# Patient Record
Sex: FEMALE | Race: BLACK OR AFRICAN AMERICAN | NOT HISPANIC OR LATINO | Employment: FULL TIME | URBAN - METROPOLITAN AREA
[De-identification: names, ages, dates, MRNs, and addresses within clinical notes are randomized per-mention and may not be internally consistent; named-entity substitution may affect disease eponyms.]

---

## 2018-04-30 LAB
EXTERNAL ABO GROUPING: NORMAL
EXTERNAL GROUP B STREP ANTIGEN: POSITIVE
EXTERNAL HEPATITIS B SURFACE ANTIGEN: NEGATIVE
EXTERNAL HEPATITIS C AB: NORMAL
EXTERNAL RH FACTOR: NEGATIVE
EXTERNAL RUBELLA QUALITATIVE: NORMAL
EXTERNAL SYPHILIS RPR SCREEN: NORMAL
HIV1 P24 AG SERPL QL IA: NORMAL

## 2018-11-01 ENCOUNTER — HOSPITAL ENCOUNTER (OUTPATIENT)
Facility: HOSPITAL | Age: 23
Setting detail: OBSERVATION
Discharge: HOME | End: 2018-11-01
Attending: OBSTETRICS & GYNECOLOGY | Admitting: OBSTETRICS & GYNECOLOGY

## 2018-11-01 VITALS
HEIGHT: 67 IN | DIASTOLIC BLOOD PRESSURE: 71 MMHG | SYSTOLIC BLOOD PRESSURE: 120 MMHG | HEART RATE: 61 BPM | BODY MASS INDEX: 35.63 KG/M2 | TEMPERATURE: 99 F | WEIGHT: 227 LBS | RESPIRATION RATE: 16 BRPM

## 2018-11-01 PROBLEM — Z34.90 PREGNANCY: Status: ACTIVE | Noted: 2018-11-01

## 2018-11-01 PROCEDURE — G0378 HOSPITAL OBSERVATION PER HR: HCPCS

## 2018-11-01 PROCEDURE — 59025 FETAL NON-STRESS TEST: CPT

## 2018-11-01 RX ORDER — PRENATAL VIT NO.126/IRON/FOLIC 28MG-0.8MG
1 TABLET ORAL DAILY
COMMUNITY
End: 2022-12-26

## 2018-11-01 RX ORDER — FERROUS SULFATE 325(65) MG
325 TABLET ORAL
COMMUNITY
End: 2022-01-20

## 2018-12-01 ENCOUNTER — ANESTHESIA EVENT (OUTPATIENT)
Dept: LABOR AND DELIVERY | Facility: HOSPITAL | Age: 23
End: 2018-12-01

## 2018-12-01 ENCOUNTER — ANESTHESIA (OUTPATIENT)
Dept: LABOR AND DELIVERY | Facility: HOSPITAL | Age: 23
End: 2018-12-01

## 2018-12-01 ENCOUNTER — HOSPITAL ENCOUNTER (INPATIENT)
Facility: HOSPITAL | Age: 23
LOS: 3 days | Discharge: HOME | End: 2018-12-04
Attending: OBSTETRICS & GYNECOLOGY | Admitting: OBSTETRICS & GYNECOLOGY

## 2018-12-01 LAB
ABO GROUP BLD: NORMAL
BASOPHILS # BLD AUTO: 0.01 10*3/MM3 (ref 0–0.2)
BASOPHILS NFR BLD AUTO: 0.1 % (ref 0–1.5)
BLD GP AB SCN SERPL QL: NEGATIVE
DEPRECATED RDW RBC AUTO: 52.1 FL (ref 37–54)
EOSINOPHIL # BLD AUTO: 0.09 10*3/MM3 (ref 0–0.7)
EOSINOPHIL NFR BLD AUTO: 1.1 % (ref 0.3–6.2)
ERYTHROCYTE [DISTWIDTH] IN BLOOD BY AUTOMATED COUNT: 16.3 % (ref 11.7–13)
HCT VFR BLD AUTO: 34.1 % (ref 35.6–45.5)
HGB BLD-MCNC: 10.1 G/DL (ref 11.9–15.5)
IMM GRANULOCYTES # BLD: 0.03 10*3/MM3 (ref 0–0.03)
IMM GRANULOCYTES NFR BLD: 0.4 % (ref 0–0.5)
LYMPHOCYTES # BLD AUTO: 2.34 10*3/MM3 (ref 0.9–4.8)
LYMPHOCYTES NFR BLD AUTO: 27.8 % (ref 19.6–45.3)
MCH RBC QN AUTO: 25.9 PG (ref 26.9–32)
MCHC RBC AUTO-ENTMCNC: 29.6 G/DL (ref 32.4–36.3)
MCV RBC AUTO: 87.4 FL (ref 80.5–98.2)
MONOCYTES # BLD AUTO: 0.86 10*3/MM3 (ref 0.2–1.2)
MONOCYTES NFR BLD AUTO: 10.2 % (ref 5–12)
NEUTROPHILS # BLD AUTO: 5.08 10*3/MM3 (ref 1.9–8.1)
NEUTROPHILS NFR BLD AUTO: 60.4 % (ref 42.7–76)
PLATELET # BLD AUTO: 250 10*3/MM3 (ref 140–500)
PMV BLD AUTO: 11.1 FL (ref 6–12)
RBC # BLD AUTO: 3.9 10*6/MM3 (ref 3.9–5.2)
RH BLD: NEGATIVE
T&S EXPIRATION DATE: NORMAL
WBC NRBC COR # BLD: 8.41 10*3/MM3 (ref 4.5–10.7)

## 2018-12-01 PROCEDURE — 25010000002 BUTORPHANOL PER 1 MG: Performed by: OBSTETRICS & GYNECOLOGY

## 2018-12-01 PROCEDURE — 86900 BLOOD TYPING SEROLOGIC ABO: CPT | Performed by: OBSTETRICS & GYNECOLOGY

## 2018-12-01 PROCEDURE — 25010000002 PENICILLIN G POTASSIUM PER 600000 UNITS: Performed by: OBSTETRICS & GYNECOLOGY

## 2018-12-01 PROCEDURE — 86901 BLOOD TYPING SEROLOGIC RH(D): CPT | Performed by: OBSTETRICS & GYNECOLOGY

## 2018-12-01 PROCEDURE — 86850 RBC ANTIBODY SCREEN: CPT | Performed by: OBSTETRICS & GYNECOLOGY

## 2018-12-01 PROCEDURE — 85025 COMPLETE CBC W/AUTO DIFF WBC: CPT | Performed by: OBSTETRICS & GYNECOLOGY

## 2018-12-01 RX ORDER — OXYTOCIN-SODIUM CHLORIDE 0.9% IV SOLN 30 UNIT/500ML 30-0.9/5 UT/ML-%
2-20 SOLUTION INTRAVENOUS
Status: DISCONTINUED | OUTPATIENT
Start: 2018-12-01 | End: 2018-12-02 | Stop reason: HOSPADM

## 2018-12-01 RX ORDER — ZOLPIDEM TARTRATE 5 MG/1
5 TABLET ORAL NIGHTLY PRN
Status: DISCONTINUED | OUTPATIENT
Start: 2018-12-01 | End: 2018-12-02 | Stop reason: HOSPADM

## 2018-12-01 RX ORDER — DIPHENHYDRAMINE HYDROCHLORIDE 50 MG/ML
25 INJECTION INTRAMUSCULAR; INTRAVENOUS NIGHTLY PRN
Status: DISCONTINUED | OUTPATIENT
Start: 2018-12-01 | End: 2018-12-02 | Stop reason: HOSPADM

## 2018-12-01 RX ORDER — ONDANSETRON 2 MG/ML
4 INJECTION INTRAMUSCULAR; INTRAVENOUS EVERY 6 HOURS PRN
Status: DISCONTINUED | OUTPATIENT
Start: 2018-12-01 | End: 2018-12-02 | Stop reason: HOSPADM

## 2018-12-01 RX ORDER — FAMOTIDINE 10 MG/ML
20 INJECTION, SOLUTION INTRAVENOUS EVERY 12 HOURS SCHEDULED
Status: DISCONTINUED | OUTPATIENT
Start: 2018-12-01 | End: 2018-12-02 | Stop reason: HOSPADM

## 2018-12-01 RX ORDER — TERBUTALINE SULFATE 1 MG/ML
0.25 INJECTION, SOLUTION SUBCUTANEOUS AS NEEDED
Status: DISCONTINUED | OUTPATIENT
Start: 2018-12-01 | End: 2018-12-02 | Stop reason: HOSPADM

## 2018-12-01 RX ORDER — OXYCODONE HYDROCHLORIDE AND ACETAMINOPHEN 5; 325 MG/1; MG/1
2 TABLET ORAL EVERY 4 HOURS PRN
Status: DISCONTINUED | OUTPATIENT
Start: 2018-12-01 | End: 2018-12-02 | Stop reason: HOSPADM

## 2018-12-01 RX ORDER — FAMOTIDINE 20 MG/1
20 TABLET, FILM COATED ORAL EVERY 12 HOURS SCHEDULED
Status: DISCONTINUED | OUTPATIENT
Start: 2018-12-01 | End: 2018-12-02 | Stop reason: HOSPADM

## 2018-12-01 RX ORDER — BUTORPHANOL TARTRATE 1 MG/ML
1 INJECTION, SOLUTION INTRAMUSCULAR; INTRAVENOUS
Status: DISCONTINUED | OUTPATIENT
Start: 2018-12-01 | End: 2018-12-01

## 2018-12-01 RX ORDER — DEXTROSE, SODIUM CHLORIDE, SODIUM LACTATE, POTASSIUM CHLORIDE, AND CALCIUM CHLORIDE 5; .6; .31; .03; .02 G/100ML; G/100ML; G/100ML; G/100ML; G/100ML
125 INJECTION, SOLUTION INTRAVENOUS CONTINUOUS
Status: DISCONTINUED | OUTPATIENT
Start: 2018-12-01 | End: 2018-12-02

## 2018-12-01 RX ORDER — MINERAL OIL
1 OIL (ML) MISCELLANEOUS ONCE
Status: DISCONTINUED | OUTPATIENT
Start: 2018-12-01 | End: 2018-12-02 | Stop reason: HOSPADM

## 2018-12-01 RX ORDER — PENICILLIN G 3000000 [IU]/50ML
3 INJECTION, SOLUTION INTRAVENOUS EVERY 4 HOURS
Status: DISCONTINUED | OUTPATIENT
Start: 2018-12-02 | End: 2018-12-02 | Stop reason: HOSPADM

## 2018-12-01 RX ORDER — SODIUM CHLORIDE, SODIUM LACTATE, POTASSIUM CHLORIDE, CALCIUM CHLORIDE 600; 310; 30; 20 MG/100ML; MG/100ML; MG/100ML; MG/100ML
125 INJECTION, SOLUTION INTRAVENOUS CONTINUOUS
Status: DISCONTINUED | OUTPATIENT
Start: 2018-12-01 | End: 2018-12-02

## 2018-12-01 RX ORDER — ACETAMINOPHEN 500 MG
1000 TABLET ORAL EVERY 4 HOURS PRN
Status: DISCONTINUED | OUTPATIENT
Start: 2018-12-01 | End: 2018-12-02 | Stop reason: HOSPADM

## 2018-12-01 RX ORDER — ONDANSETRON 4 MG/1
4 TABLET, ORALLY DISINTEGRATING ORAL EVERY 6 HOURS PRN
Status: DISCONTINUED | OUTPATIENT
Start: 2018-12-01 | End: 2018-12-02 | Stop reason: HOSPADM

## 2018-12-01 RX ORDER — OXYCODONE HYDROCHLORIDE AND ACETAMINOPHEN 5; 325 MG/1; MG/1
1 TABLET ORAL EVERY 4 HOURS PRN
Status: DISCONTINUED | OUTPATIENT
Start: 2018-12-01 | End: 2018-12-02 | Stop reason: HOSPADM

## 2018-12-01 RX ORDER — ONDANSETRON 4 MG/1
4 TABLET, FILM COATED ORAL EVERY 6 HOURS PRN
Status: DISCONTINUED | OUTPATIENT
Start: 2018-12-01 | End: 2018-12-02 | Stop reason: HOSPADM

## 2018-12-01 RX ORDER — DIPHENHYDRAMINE HCL 25 MG
25 CAPSULE ORAL NIGHTLY PRN
Status: DISCONTINUED | OUTPATIENT
Start: 2018-12-01 | End: 2018-12-02 | Stop reason: HOSPADM

## 2018-12-01 RX ADMIN — BUTORPHANOL TARTRATE 2 MG: 2 INJECTION, SOLUTION INTRAMUSCULAR; INTRAVENOUS at 23:58

## 2018-12-01 RX ADMIN — SODIUM CHLORIDE, POTASSIUM CHLORIDE, SODIUM LACTATE AND CALCIUM CHLORIDE 125 ML/HR: 600; 310; 30; 20 INJECTION, SOLUTION INTRAVENOUS at 23:06

## 2018-12-01 RX ADMIN — SODIUM CHLORIDE 5 MILLION UNITS: 900 INJECTION INTRAVENOUS at 23:03

## 2018-12-02 LAB
ABO GROUP BLD: NORMAL
ATMOSPHERIC PRESS: 741 MMHG
BASE EXCESS BLDCOV CALC-SCNC: -2 MMOL/L (ref -30–30)
BDY SITE: ABNORMAL
FETAL BLEED: NEGATIVE
HCO3 BLDCOV-SCNC: 22.9 MMOL/L
MODALITY: ABNORMAL
NOTE: ABNORMAL
NUMBER OF DOSES: NORMAL
PCO2 BLDCOV: 38.7 MM HG (ref 35–51.3)
PH BLDCOV: 7.38 PH UNITS (ref 7.26–7.4)
PO2 BLDCOV: 24.7 MM HG (ref 19–39)
RH BLD: NEGATIVE
SAO2 % BLDCOA: 43.5 % (ref 92–99)
SAO2 % BLDCOV: ABNORMAL %

## 2018-12-02 PROCEDURE — 82803 BLOOD GASES ANY COMBINATION: CPT

## 2018-12-02 PROCEDURE — 86901 BLOOD TYPING SEROLOGIC RH(D): CPT | Performed by: OBSTETRICS & GYNECOLOGY

## 2018-12-02 PROCEDURE — 86900 BLOOD TYPING SEROLOGIC ABO: CPT | Performed by: OBSTETRICS & GYNECOLOGY

## 2018-12-02 PROCEDURE — 25010000002 PENICILLIN G POTASSIUM PER 600000 UNITS: Performed by: OBSTETRICS & GYNECOLOGY

## 2018-12-02 PROCEDURE — 0KQM0ZZ REPAIR PERINEUM MUSCLE, OPEN APPROACH: ICD-10-PCS | Performed by: OBSTETRICS & GYNECOLOGY

## 2018-12-02 PROCEDURE — 85461 HEMOGLOBIN FETAL: CPT | Performed by: OBSTETRICS & GYNECOLOGY

## 2018-12-02 PROCEDURE — C1755 CATHETER, INTRASPINAL: HCPCS | Performed by: ANESTHESIOLOGY

## 2018-12-02 RX ORDER — ONDANSETRON 2 MG/ML
4 INJECTION INTRAMUSCULAR; INTRAVENOUS EVERY 6 HOURS PRN
Status: DISCONTINUED | OUTPATIENT
Start: 2018-12-02 | End: 2018-12-02 | Stop reason: HOSPADM

## 2018-12-02 RX ORDER — EPHEDRINE SULFATE 50 MG/ML
5 INJECTION, SOLUTION INTRAVENOUS AS NEEDED
Status: DISCONTINUED | OUTPATIENT
Start: 2018-12-02 | End: 2018-12-02 | Stop reason: HOSPADM

## 2018-12-02 RX ORDER — ACETAMINOPHEN 325 MG/1
650 TABLET ORAL EVERY 4 HOURS PRN
Status: DISCONTINUED | OUTPATIENT
Start: 2018-12-02 | End: 2018-12-04 | Stop reason: HOSPADM

## 2018-12-02 RX ORDER — FAMOTIDINE 10 MG/ML
20 INJECTION, SOLUTION INTRAVENOUS ONCE AS NEEDED
Status: DISCONTINUED | OUTPATIENT
Start: 2018-12-02 | End: 2018-12-02 | Stop reason: HOSPADM

## 2018-12-02 RX ORDER — SUFENTANIL CITRATE 50 UG/ML
INJECTION EPIDURAL; INTRAVENOUS
Status: COMPLETED
Start: 2018-12-02 | End: 2018-12-02

## 2018-12-02 RX ORDER — BUPIVACAINE HYDROCHLORIDE AND EPINEPHRINE 5; 5 MG/ML; UG/ML
INJECTION, SOLUTION EPIDURAL; INTRACAUDAL; PERINEURAL
Status: DISPENSED
Start: 2018-12-02 | End: 2018-12-03

## 2018-12-02 RX ORDER — ONDANSETRON 4 MG/1
4 TABLET, FILM COATED ORAL EVERY 6 HOURS PRN
Status: DISCONTINUED | OUTPATIENT
Start: 2018-12-02 | End: 2018-12-02 | Stop reason: HOSPADM

## 2018-12-02 RX ORDER — ZOLPIDEM TARTRATE 5 MG/1
5 TABLET ORAL NIGHTLY PRN
Status: DISCONTINUED | OUTPATIENT
Start: 2018-12-02 | End: 2018-12-02 | Stop reason: HOSPADM

## 2018-12-02 RX ORDER — MINERAL OIL
30 OIL (ML) MISCELLANEOUS AS NEEDED
Status: DISCONTINUED | OUTPATIENT
Start: 2018-12-02 | End: 2018-12-02 | Stop reason: HOSPADM

## 2018-12-02 RX ORDER — IBUPROFEN 600 MG/1
600 TABLET ORAL EVERY 8 HOURS PRN
Status: DISCONTINUED | OUTPATIENT
Start: 2018-12-02 | End: 2018-12-04 | Stop reason: HOSPADM

## 2018-12-02 RX ORDER — HYDROMORPHONE HYDROCHLORIDE 1 MG/ML
0.5 INJECTION, SOLUTION INTRAMUSCULAR; INTRAVENOUS; SUBCUTANEOUS
Status: DISCONTINUED | OUTPATIENT
Start: 2018-12-02 | End: 2018-12-02 | Stop reason: HOSPADM

## 2018-12-02 RX ORDER — PROMETHAZINE HYDROCHLORIDE 25 MG/1
12.5 TABLET ORAL EVERY 6 HOURS PRN
Status: DISCONTINUED | OUTPATIENT
Start: 2018-12-02 | End: 2018-12-02 | Stop reason: HOSPADM

## 2018-12-02 RX ORDER — SUFENTANIL CITRATE 50 UG/ML
INJECTION EPIDURAL; INTRAVENOUS AS NEEDED
Status: DISCONTINUED | OUTPATIENT
Start: 2018-12-02 | End: 2018-12-02 | Stop reason: SURG

## 2018-12-02 RX ORDER — BUPIVACAINE HYDROCHLORIDE 2.5 MG/ML
INJECTION, SOLUTION EPIDURAL; INFILTRATION; INTRACAUDAL AS NEEDED
Status: DISCONTINUED | OUTPATIENT
Start: 2018-12-02 | End: 2018-12-02 | Stop reason: SURG

## 2018-12-02 RX ORDER — OXYCODONE HYDROCHLORIDE AND ACETAMINOPHEN 5; 325 MG/1; MG/1
1 TABLET ORAL EVERY 4 HOURS PRN
Status: DISCONTINUED | OUTPATIENT
Start: 2018-12-02 | End: 2018-12-02 | Stop reason: HOSPADM

## 2018-12-02 RX ORDER — PROMETHAZINE HYDROCHLORIDE 12.5 MG/1
12.5 SUPPOSITORY RECTAL EVERY 6 HOURS PRN
Status: DISCONTINUED | OUTPATIENT
Start: 2018-12-02 | End: 2018-12-02 | Stop reason: HOSPADM

## 2018-12-02 RX ORDER — DIPHENHYDRAMINE HYDROCHLORIDE 50 MG/ML
12.5 INJECTION INTRAMUSCULAR; INTRAVENOUS EVERY 8 HOURS PRN
Status: DISCONTINUED | OUTPATIENT
Start: 2018-12-02 | End: 2018-12-02 | Stop reason: HOSPADM

## 2018-12-02 RX ORDER — METHYLERGONOVINE MALEATE 0.2 MG/ML
200 INJECTION INTRAVENOUS ONCE AS NEEDED
Status: DISCONTINUED | OUTPATIENT
Start: 2018-12-02 | End: 2018-12-02 | Stop reason: HOSPADM

## 2018-12-02 RX ORDER — DIPHENHYDRAMINE HYDROCHLORIDE 50 MG/ML
25 INJECTION INTRAMUSCULAR; INTRAVENOUS NIGHTLY PRN
Status: DISCONTINUED | OUTPATIENT
Start: 2018-12-02 | End: 2018-12-02 | Stop reason: HOSPADM

## 2018-12-02 RX ORDER — ONDANSETRON 4 MG/1
4 TABLET, ORALLY DISINTEGRATING ORAL EVERY 6 HOURS PRN
Status: DISCONTINUED | OUTPATIENT
Start: 2018-12-02 | End: 2018-12-02 | Stop reason: HOSPADM

## 2018-12-02 RX ORDER — OXYCODONE AND ACETAMINOPHEN 7.5; 325 MG/1; MG/1
1 TABLET ORAL EVERY 4 HOURS PRN
Status: DISCONTINUED | OUTPATIENT
Start: 2018-12-02 | End: 2018-12-04 | Stop reason: HOSPADM

## 2018-12-02 RX ORDER — PROMETHAZINE HYDROCHLORIDE 25 MG/ML
12.5 INJECTION, SOLUTION INTRAMUSCULAR; INTRAVENOUS EVERY 6 HOURS PRN
Status: DISCONTINUED | OUTPATIENT
Start: 2018-12-02 | End: 2018-12-02 | Stop reason: HOSPADM

## 2018-12-02 RX ORDER — ACETAMINOPHEN 650 MG/1
650 SUPPOSITORY RECTAL EVERY 4 HOURS PRN
Status: DISCONTINUED | OUTPATIENT
Start: 2018-12-02 | End: 2018-12-02 | Stop reason: HOSPADM

## 2018-12-02 RX ORDER — ERYTHROMYCIN 5 MG/G
OINTMENT OPHTHALMIC
Status: DISPENSED
Start: 2018-12-02 | End: 2018-12-03

## 2018-12-02 RX ORDER — OXYTOCIN-SODIUM CHLORIDE 0.9% IV SOLN 30 UNIT/500ML 30-0.9/5 UT/ML-%
250 SOLUTION INTRAVENOUS CONTINUOUS
Status: ACTIVE | OUTPATIENT
Start: 2018-12-02 | End: 2018-12-02

## 2018-12-02 RX ORDER — OXYTOCIN-SODIUM CHLORIDE 0.9% IV SOLN 30 UNIT/500ML 30-0.9/5 UT/ML-%
999 SOLUTION INTRAVENOUS ONCE
Status: COMPLETED | OUTPATIENT
Start: 2018-12-02 | End: 2018-12-02

## 2018-12-02 RX ORDER — LIDOCAINE HYDROCHLORIDE AND EPINEPHRINE 15; 5 MG/ML; UG/ML
INJECTION, SOLUTION EPIDURAL AS NEEDED
Status: DISCONTINUED | OUTPATIENT
Start: 2018-12-02 | End: 2018-12-02 | Stop reason: SURG

## 2018-12-02 RX ORDER — NALBUPHINE HCL 10 MG/ML
5 AMPUL (ML) INJECTION
Status: DISCONTINUED | OUTPATIENT
Start: 2018-12-02 | End: 2018-12-02 | Stop reason: HOSPADM

## 2018-12-02 RX ORDER — PHYTONADIONE 1 MG/.5ML
INJECTION, EMULSION INTRAMUSCULAR; INTRAVENOUS; SUBCUTANEOUS
Status: DISPENSED
Start: 2018-12-02 | End: 2018-12-03

## 2018-12-02 RX ORDER — SODIUM CHLORIDE 0.9 % (FLUSH) 0.9 %
1-10 SYRINGE (ML) INJECTION AS NEEDED
Status: DISCONTINUED | OUTPATIENT
Start: 2018-12-02 | End: 2018-12-04 | Stop reason: HOSPADM

## 2018-12-02 RX ORDER — OXYTOCIN-SODIUM CHLORIDE 0.9% IV SOLN 30 UNIT/500ML 30-0.9/5 UT/ML-%
125 SOLUTION INTRAVENOUS CONTINUOUS PRN
Status: COMPLETED | OUTPATIENT
Start: 2018-12-02 | End: 2018-12-02

## 2018-12-02 RX ORDER — BISACODYL 10 MG
10 SUPPOSITORY, RECTAL RECTAL DAILY PRN
Status: DISCONTINUED | OUTPATIENT
Start: 2018-12-03 | End: 2018-12-04 | Stop reason: HOSPADM

## 2018-12-02 RX ORDER — ONDANSETRON 2 MG/ML
4 INJECTION INTRAMUSCULAR; INTRAVENOUS ONCE AS NEEDED
Status: DISCONTINUED | OUTPATIENT
Start: 2018-12-02 | End: 2018-12-02 | Stop reason: HOSPADM

## 2018-12-02 RX ORDER — MISOPROSTOL 200 UG/1
800 TABLET ORAL AS NEEDED
Status: DISCONTINUED | OUTPATIENT
Start: 2018-12-02 | End: 2018-12-02 | Stop reason: HOSPADM

## 2018-12-02 RX ORDER — IBUPROFEN 600 MG/1
600 TABLET ORAL EVERY 6 HOURS PRN
Status: DISCONTINUED | OUTPATIENT
Start: 2018-12-02 | End: 2018-12-02 | Stop reason: HOSPADM

## 2018-12-02 RX ORDER — DIPHENHYDRAMINE HCL 25 MG
25 CAPSULE ORAL NIGHTLY PRN
Status: DISCONTINUED | OUTPATIENT
Start: 2018-12-02 | End: 2018-12-02 | Stop reason: HOSPADM

## 2018-12-02 RX ORDER — ONDANSETRON 4 MG/1
4 TABLET, FILM COATED ORAL EVERY 8 HOURS PRN
Status: DISCONTINUED | OUTPATIENT
Start: 2018-12-02 | End: 2018-12-04 | Stop reason: HOSPADM

## 2018-12-02 RX ORDER — ACETAMINOPHEN 500 MG
1000 TABLET ORAL EVERY 4 HOURS PRN
Status: DISCONTINUED | OUTPATIENT
Start: 2018-12-02 | End: 2018-12-02 | Stop reason: HOSPADM

## 2018-12-02 RX ORDER — OXYCODONE HYDROCHLORIDE AND ACETAMINOPHEN 5; 325 MG/1; MG/1
2 TABLET ORAL EVERY 4 HOURS PRN
Status: DISCONTINUED | OUTPATIENT
Start: 2018-12-02 | End: 2018-12-02 | Stop reason: HOSPADM

## 2018-12-02 RX ORDER — OXYCODONE HYDROCHLORIDE AND ACETAMINOPHEN 5; 325 MG/1; MG/1
1 TABLET ORAL EVERY 4 HOURS PRN
Status: DISCONTINUED | OUTPATIENT
Start: 2018-12-02 | End: 2018-12-04 | Stop reason: HOSPADM

## 2018-12-02 RX ORDER — CARBOPROST TROMETHAMINE 250 UG/ML
250 INJECTION, SOLUTION INTRAMUSCULAR AS NEEDED
Status: DISCONTINUED | OUTPATIENT
Start: 2018-12-02 | End: 2018-12-02 | Stop reason: HOSPADM

## 2018-12-02 RX ADMIN — SUFENTANIL CITRATE 7 MCG: 50 INJECTION EPIDURAL; INTRAVENOUS at 13:21

## 2018-12-02 RX ADMIN — OXYTOCIN 2 MILLI-UNITS/MIN: 10 INJECTION, SOLUTION INTRAMUSCULAR; INTRAVENOUS at 08:06

## 2018-12-02 RX ADMIN — OXYTOCIN 999 ML/HR: 10 INJECTION, SOLUTION INTRAMUSCULAR; INTRAVENOUS at 15:21

## 2018-12-02 RX ADMIN — SODIUM CHLORIDE, POTASSIUM CHLORIDE, SODIUM LACTATE AND CALCIUM CHLORIDE 125 ML/HR: 600; 310; 30; 20 INJECTION, SOLUTION INTRAVENOUS at 10:58

## 2018-12-02 RX ADMIN — Medication 10 ML/HR: at 03:46

## 2018-12-02 RX ADMIN — OXYTOCIN 125 ML/HR: 10 INJECTION, SOLUTION INTRAMUSCULAR; INTRAVENOUS at 16:38

## 2018-12-02 RX ADMIN — PENICILLIN G 3 MILLION UNITS: 3000000 INJECTION, SOLUTION INTRAVENOUS at 11:34

## 2018-12-02 RX ADMIN — PENICILLIN G 3 MILLION UNITS: 3000000 INJECTION, SOLUTION INTRAVENOUS at 03:56

## 2018-12-02 RX ADMIN — SODIUM CHLORIDE, SODIUM LACTATE, POTASSIUM CHLORIDE, CALCIUM CHLORIDE AND DEXTROSE MONOHYDRATE 125 ML/HR: 5; 600; 310; 30; 20 INJECTION, SOLUTION INTRAVENOUS at 03:56

## 2018-12-02 RX ADMIN — IBUPROFEN 600 MG: 600 TABLET ORAL at 20:16

## 2018-12-02 RX ADMIN — LIDOCAINE HYDROCHLORIDE AND EPINEPHRINE 4 ML: 15; 5 INJECTION, SOLUTION EPIDURAL at 03:39

## 2018-12-02 RX ADMIN — BUPIVACAINE HYDROCHLORIDE 7 ML: 2.5 INJECTION, SOLUTION EPIDURAL; INFILTRATION; INTRACAUDAL; PERINEURAL at 13:21

## 2018-12-02 RX ADMIN — PENICILLIN G 3 MILLION UNITS: 3000000 INJECTION, SOLUTION INTRAVENOUS at 07:34

## 2018-12-02 NOTE — H&P
.[facility]  Obstetric History and Physical    Chief Complaint   Patient presents with   • Contractions       Subjective     Patient is a 22 y.o. female  currently at 40w0d, who presents in early labor. cvx in office 1cm and 3cm on arrival here.    Comfortable now with epidural       Pos gbs      Past OB History:       Obstetric History       T0      L0     SAB0   TAB0   Ectopic0   Molar0   Multiple0   Live Births0       # Outcome Date GA Lbr Issac/2nd Weight Sex Delivery Anes PTL Lv   1 Current                   Past Medical History: Past Medical History:   Diagnosis Date   • Abnormal Pap smear of cervix    • Anemia    • Migraine       Past Surgical History History reviewed. No pertinent surgical history.   Family History: History reviewed. No pertinent family history.   Social History:  reports that  has never smoked. She does not have any smokeless tobacco history on file.   reports that she does not drink alcohol.   reports that she does not use drugs.    Allergies:     Patient has no known allergies.       Objective       Vital Signs Range for the last 24 hours  Temperature: Temp:  [98.4 °F (36.9 °C)-98.6 °F (37 °C)] 98.6 °F (37 °C)   Temp Source: Temp src: Oral   BP: BP: (107-147)/(54-86) 107/54   Pulse: Heart Rate:  [] 65   Respirations: Resp:  [18] 18                   Physical Examination:     General :  Alert in NAD  Abdomen: Gravid, nontender        Cervix: Exam by: Method: sterile exam per physician   Dilation: Cervical Dilation (cm): 5-6   Effacement: Cervical Effacement: 80-90%   Station: Fetal Station: -2       Fetal Heart Rate Assessment   Method: Fetal HR Assessment Method: external   Beats/min: Fetal HR (beats/min): 130   Baseline: Fetal Heart Baseline Rate: normal range   Varibility: Fetal HR Variability: moderate (amplitude range 6 to 25 bpm)   Accels: Fetal HR Accelerations: absent   Decels: Fetal HR Decelerations: prolonged   Tracing Category:       Uterine Assessment    Method: Method: external tocotransducer   Frequency (min): Contraction Frequency (Minutes): occasional   Ctx Count in 10 min:     Duration:     Intensity: Contraction Intensity: mild by palpation   Intensity by IUPC:     Resting Tone: Uterine Resting Tone: soft by palpation   Resting Tone by IUPC:     Jenn Units:         cvx- AROM-clear. 5/90/-2 after arom      Assessment/Plan       Assessment:  1.  Intrauterine pregnancy at 40w0d weeks gestation with reassuring fetal status.    2.  Spontaneous labor-     Plan:   Plan of care has been reviewed with patient and family,.   All questions answered.      pit if needed    Office prenatal reviewed        Macarena Keene MD  12/2/2018  6:36 AM

## 2018-12-02 NOTE — L&D DELIVERY NOTE
[facility]  Vaginal Delivery Note    Regine Martins22 y.o.  at 40w0d            Augmentation: AROM;Oxytocin   Labor onset:2018  9:00 PM   Dilation complete: 2018  1:35 PM   Beginning of second stage: 2018  3:18 PM     Antibiotics received during labor: Yes     Delivery     Delivery: Vaginal, Spontaneous     YOB: 2019    Time of Birth:  Labor complications:  3:19 PM   None .   Anesthesia: Epidural     Delivering clinician: Dai Barber MD   Additional complications:       Infant    Findings: Viable female  infant    Infant observations: Weight: 3260 g (7 lb 3 oz)    Observations/Comments:  Scale 5      Apgars: 8   @ 1 minute /    9   @ 5 minutes                                       Estimated Blood Loss: 400  mls.       Delivery narrative: The patient is a 22 y.o.  at 40w0d.  Presented in labor.  Membrane rupture/fluid: artificial rupture of membranes  at 6:15 AM  on 2018  Clear  Progressed normally to complete at 1:35 PM . Labored down until 2018  3:18 PM . Fetal status reassuring throughout.   of viable  infant.  3:19 PM . Nuchal cord x0.  Both shoulders delivery easily. Apgars 8   @ 1 minute /9   @ 5 minutes. Weight: 3260 g (7 lb 3 oz) . Spontaneous delivery of an intact placenta with a 3-vessel cord. Cervix and rectum intact. 2nd degree laceration repaired in usual fashion with 2-0 chromic suture. Mother and baby well bonded and recovering well.             Dai Barber MD  18  3:24 AM

## 2018-12-02 NOTE — PLAN OF CARE
Problem: Labor (Cervical Ripen, Induct, Augment) (Adult,Obstetrics,Pediatric)  Goal: Signs and Symptoms of Listed Potential Problems Will be Absent, Minimized or Managed (Labor)  Outcome: Ongoing (interventions implemented as appropriate)   12/02/18 0721   Goal/Outcome Evaluation   Problems Assessed (Labor) all   Problems Present (Labor) none       Problem: Anesthesia/Analgesia, Neuraxial (Adult)  Goal: Signs and Symptoms of Listed Potential Problems Will be Absent, Minimized or Managed (Anesthesia/Analgesia, Neuraxial)  Outcome: Ongoing (interventions implemented as appropriate)   12/02/18 0721   Goal/Outcome Evaluation   Problems Assessed (Neuraxial Anesthesia/Analgesia) all   Problems Present (Neuraxial Anes/Analg) none

## 2018-12-02 NOTE — ANESTHESIA PROCEDURE NOTES
Labor Epidural      Patient location during procedure: OB  Performed By  Anesthesiologist: Hannah Babin MD  Preanesthetic Checklist  Completed: patient identified, site marked, surgical consent, pre-op evaluation, timeout performed, IV checked, risks and benefits discussed and monitors and equipment checked  Prep:  Pt Position:sitting  Sterile Tech:cap, gloves, gown, mask and sterile barrier  Prep:chlorhexidine gluconate and isopropyl alcohol  Monitoring:blood pressure monitoring, continuous pulse oximetry and EKG  Epidural Block Procedure:  Approach:midline  Guidance:landmark technique and palpation technique  Location:L4-L5  Needle Type:Tuohy  Needle Gauge:17 G  Loss of Resistance Medium: saline  Loss of Resistance: 7cm  Cath Depth at skin:12 cm  Paresthesia: none  Aspiration:negative  Test Dose:negative  Number of Attempts: 1  Post Assessment:  Dressing:occlusive dressing applied and secured with tape  Pt Tolerance:patient tolerated the procedure well with no apparent complications  Complications:no

## 2018-12-03 PROBLEM — D64.9 ANEMIA: Status: ACTIVE | Noted: 2018-12-03

## 2018-12-03 PROBLEM — Z34.90 PREGNANCY: Status: RESOLVED | Noted: 2018-11-01 | Resolved: 2018-12-03

## 2018-12-03 LAB
BASOPHILS # BLD AUTO: 0.02 10*3/MM3 (ref 0–0.2)
BASOPHILS NFR BLD AUTO: 0.2 % (ref 0–1.5)
DEPRECATED RDW RBC AUTO: 53.1 FL (ref 37–54)
EOSINOPHIL # BLD AUTO: 0.04 10*3/MM3 (ref 0–0.7)
EOSINOPHIL NFR BLD AUTO: 0.4 % (ref 0.3–6.2)
ERYTHROCYTE [DISTWIDTH] IN BLOOD BY AUTOMATED COUNT: 16.4 % (ref 11.7–13)
HCT VFR BLD AUTO: 27.7 % (ref 35.6–45.5)
HGB BLD-MCNC: 8.3 G/DL (ref 11.9–15.5)
IMM GRANULOCYTES # BLD: 0.03 10*3/MM3 (ref 0–0.03)
IMM GRANULOCYTES NFR BLD: 0.3 % (ref 0–0.5)
LYMPHOCYTES # BLD AUTO: 2.47 10*3/MM3 (ref 0.9–4.8)
LYMPHOCYTES NFR BLD AUTO: 27.2 % (ref 19.6–45.3)
MCH RBC QN AUTO: 26.5 PG (ref 26.9–32)
MCHC RBC AUTO-ENTMCNC: 30 G/DL (ref 32.4–36.3)
MCV RBC AUTO: 88.5 FL (ref 80.5–98.2)
MONOCYTES # BLD AUTO: 0.8 10*3/MM3 (ref 0.2–1.2)
MONOCYTES NFR BLD AUTO: 8.8 % (ref 5–12)
NEUTROPHILS # BLD AUTO: 5.72 10*3/MM3 (ref 1.9–8.1)
NEUTROPHILS NFR BLD AUTO: 63.1 % (ref 42.7–76)
PLATELET # BLD AUTO: 187 10*3/MM3 (ref 140–500)
PMV BLD AUTO: 11 FL (ref 6–12)
RBC # BLD AUTO: 3.13 10*6/MM3 (ref 3.9–5.2)
WBC NRBC COR # BLD: 9.08 10*3/MM3 (ref 4.5–10.7)

## 2018-12-03 PROCEDURE — 25010000002 RHO D IMMUNE GLOBULIN 1500 UNIT/2ML SOLUTION PREFILLED SYRINGE: Performed by: OBSTETRICS & GYNECOLOGY

## 2018-12-03 PROCEDURE — 85025 COMPLETE CBC W/AUTO DIFF WBC: CPT | Performed by: OBSTETRICS & GYNECOLOGY

## 2018-12-03 RX ADMIN — IBUPROFEN 600 MG: 600 TABLET ORAL at 08:39

## 2018-12-03 RX ADMIN — HUMAN RHO(D) IMMUNE GLOBULIN 1500 UNITS: 1500 SOLUTION INTRAMUSCULAR; INTRAVENOUS at 00:03

## 2018-12-03 RX ADMIN — IBUPROFEN 600 MG: 600 TABLET ORAL at 17:34

## 2018-12-03 RX ADMIN — Medication: at 02:38

## 2018-12-03 NOTE — PLAN OF CARE
Problem: Patient Care Overview  Goal: Plan of Care Review  Outcome: Ongoing (interventions implemented as appropriate)   18 0554   Coping/Psychosocial   Plan of Care Reviewed With patient;mother   Plan of Care Review   Progress improving   OTHER   Outcome Summary VSS. Bleeding controlled. Voiding freely. Pain well controlled. [place] PO. Amb in room to BR.      Goal: Individualization and Mutuality  Outcome: Ongoing (interventions implemented as appropriate)    Goal: Discharge Needs Assessment  Outcome: Ongoing (interventions implemented as appropriate)    Goal: Interprofessional Rounds/Family Conf  Outcome: Ongoing (interventions implemented as appropriate)      Problem: Fall Risk,  (Adult,Obstetrics,Pediatric)  Goal: Identify Related Risk Factors and Signs and Symptoms  Outcome: Ongoing (interventions implemented as appropriate)    Goal: Absence of Maternal Fall  Outcome: Ongoing (interventions implemented as appropriate)    Goal: Absence of [place] Fall/Drop  Outcome: Ongoing (interventions implemented as appropriate)      Problem: Skin Injury Risk (Adult)  Goal: Identify Related Risk Factors and Signs and Symptoms  Outcome: Ongoing (interventions implemented as appropriate)    Goal: Skin Health and Integrity  Outcome: Ongoing (interventions implemented as appropriate)

## 2018-12-03 NOTE — ADDENDUM NOTE
Addendum  created 12/02/18 1904 by Mert Harding MD    Visit Navigator Flowsheet section accepted

## 2018-12-03 NOTE — PLAN OF CARE
Problem: Postpartum (Vaginal Delivery) (Adult,Obstetrics,Pediatric)  Goal: Signs and Symptoms of Listed Potential Problems Will be Absent, Minimized or Managed (Postpartum)  Outcome: Ongoing (interventions implemented as appropriate)   12/03/18 7596   Goal/Outcome Evaluation   Problems Assessed (Postpartum Vaginal Delivery) all   Problems Present (Postpartum Vag Deliv) none

## 2018-12-03 NOTE — PROGRESS NOTES
[facility]  Vaginal Delivery Progress Note    Patient Name: Regine Martins  :  1995  MRN:  [id number]      Subjective   Postpartum Day 1: Vaginal Delivery of a female infant.     The patient feels well without complaints.  Her pain is well controlled.  Reports normal lochia.     The patient plans to breastfeed.    Objective     Vital Signs Range for the last 24 hours  Temperature: Temp:  [98.3 °F (36.8 °C)-100.1 °F (37.8 °C)] 98.6 °F (37 °C)       BP: BP: (103-149)/(57-91) 113/73   Pulse: Heart Rate:  [] 76   Respirations: Resp:  [16-19] 16                       Physical Exam:  General: Awake and alert  Abdomen: Fundus: firm, non tender, 1 below umbilicus  Extremities:  trace edema, NT     Labs:     Results from last 7 days   Lab Units  18   0519  18   2210   WBC 10*3/mm3  9.08  8.41   HEMOGLOBIN g/dL  8.3*  10.1*   HEMATOCRIT %  27.7*  34.1*   PLATELETS 10*3/mm3  187  250       Prenatal labs results reviewed:  Yes   Rubella:  Immune  Rh Status:    RH type   Date Value Ref Range Status   2018 Negative  Final     Comment:     RhIG IS Indicated. Baby is Rh Positive         Assessment/Plan  : 1. PPD1 S/P  - Doing well, continue usual cares.     Will continue iron on d/c          Anemia          KINSEY Hanna  12/3/2018  9:18 AM   Patient seen and examined. Agree with above assessment.     Arina Dean MD  12/3/2018  12:12 PM

## 2018-12-03 NOTE — LACTATION NOTE
P1. Patient states baby has been sleepy all morning and she has been unable to get baby to nurse. LC offered to help but mom was ready to get in the shower and said she would call out for help.

## 2018-12-04 VITALS
HEART RATE: 74 BPM | OXYGEN SATURATION: 99 % | WEIGHT: 227 LBS | TEMPERATURE: 98.1 F | DIASTOLIC BLOOD PRESSURE: 90 MMHG | BODY MASS INDEX: 35.63 KG/M2 | HEIGHT: 67 IN | SYSTOLIC BLOOD PRESSURE: 141 MMHG | RESPIRATION RATE: 16 BRPM

## 2018-12-04 RX ORDER — OXYCODONE HYDROCHLORIDE AND ACETAMINOPHEN 5; 325 MG/1; MG/1
2 TABLET ORAL EVERY 4 HOURS PRN
Qty: 20 TABLET | Refills: 0 | Status: SHIPPED | OUTPATIENT
Start: 2018-12-04 | End: 2018-12-06

## 2018-12-04 RX ORDER — IBUPROFEN 600 MG/1
600 TABLET ORAL EVERY 8 HOURS PRN
Qty: 30 TABLET | Refills: 3 | Status: ON HOLD | OUTPATIENT
Start: 2018-12-04 | End: 2022-03-05 | Stop reason: SDUPTHER

## 2018-12-04 RX ADMIN — IBUPROFEN 600 MG: 600 TABLET ORAL at 04:28

## 2018-12-04 NOTE — LACTATION NOTE
P1. Patient called for LC due to baby crying and not latching at breast. Baby felt dry and breasts are soft. Latched baby with a few drops of glucose water to nipple. Discussed baby feeling thirsty before milk comes in and gave her a few 1cc syringes to take home. Encouraged her to use her personal pump 3 x daily until breasts are heavy, full and leaking milk. Baby has been nursing well and stooling but behind on wet diapers per mom.    Lactation Consult Note    Evaluation Completed: 2018 11:00 AM  Patient Name: Regine Martins  :  1995  MRN:  [id number]     REFERRAL  INFORMATION:                          Date of Referral: 18   Person Making Referral: patient  Maternal Reason for Referral: breastfeeding currently       DELIVERY HISTORY:          Skin to skin initiation date/time: 2018  3:20 PM   Skin to skin end date/time:              MATERNAL ASSESSMENT:  Breast Size Issue: none (18 1054 : Candida Harvey RN)  Breast Shape: pendulous (18 1054 : Candida Harvey RN)  Breast Density: soft, other (see comments)(enc patient to insurance pump until milk comes in . ) (18 1054 : Candida Harvey RN)  Areola: elastic (18 1054 : Candida Harvey RN)  Nipples: everted, graspable (18 1054 : Candida Harvey, RN)                INFANT ASSESSMENT:  Information for the patient's :  Amanuel Martins [[id number]]   No past medical history on file.                                                                                                                                MATERNAL INFANT FEEDING:  Maternal Preparation: breast care (18 1054 : Candida Harvey RN)  Maternal Emotional State: assist needed (18 1054 : Candida Harvey, RN)  Infant Positioning: clutch/football (18 1054 : Candida Harvey, RN)   Signs of Milk Transfer: infant jaw motion present (18 1054 : Candida Harvey, LYNETTE)              Milk  Ejection Reflex: present (12/04/18 1054 : Candida Harvey RN)           Latch Assistance: yes (12/04/18 1054 : Candida Harvey, RN)                               EQUIPMENT TYPE:  Breast Pump Type: double electric, personal (12/04/18 1054 : Candida Harvey RN)                              BREAST PUMPING:  Breast Pumping Interventions: other (see comments)(3 x per day until milk is in) (12/04/18 1054 : Candida Harvey, RN)       LACTATION REFERRALS:

## 2018-12-04 NOTE — PLAN OF CARE
Problem: Patient Care Overview  Goal: Plan of Care Review  Outcome: Ongoing (interventions implemented as appropriate)   12/04/18 0406   Coping/Psychosocial   Plan of Care Reviewed With patient   Plan of Care Review   Progress improving   OTHER   Outcome Summary VSS, bleeding wnl, voiding without difficulty, pain controlled with motrin, breastfeeding infant on demand       Problem: Postpartum (Vaginal Delivery) (Adult,Obstetrics,Pediatric)  Goal: Signs and Symptoms of Listed Potential Problems Will be Absent, Minimized or Managed (Postpartum)  Outcome: Ongoing (interventions implemented as appropriate)   12/04/18 0406   Goal/Outcome Evaluation   Problems Assessed (Postpartum Vaginal Delivery) all   Problems Present (Postpartum Vag Deliv) none

## 2018-12-04 NOTE — DISCHARGE SUMMARY
Date of Discharge:  2018    Discharge Diagnosis: vaginal delivery    Presenting Problem/History of Present Illness  Pregnancy [Z34.90]  Pregnancy [Z34.90]       Hospital Course  Patient is a 22 y.o. female presented with labor at term.  Delivered viable female infant per Dr. Barber.  No pp complications.  Will cont fe on d/c.      Procedures Performed         Consults:   Consults     No orders found from 2018 to 2018.          Condition on Discharge:   Subjective   Postpartum Day 2 Vaginal Delivery.    The patient feels well without complaints.    Vital Signs  Temp:  [98.1 °F (36.7 °C)-98.2 °F (36.8 °C)] 98.1 °F (36.7 °C)  Heart Rate:  [66-74] 74  Resp:  [16] 16  BP: (106-141)/(67-90) 141/90    Physical Exam:   General: Awake and alert   Abdomen: Fundus: firm, non tender    Extremities:  Calves NT bilaterally    Assessment/Plan     PPD2  S/P  -   Stable for discharge. Instructions reviewed      Discharge Disposition  Home or Self Care    Discharge Medications     Discharge Medications      New Medications      Instructions Start Date   ibuprofen 600 MG tablet  Commonly known as:  ADVIL,MOTRIN   600 mg, Oral, Every 8 Hours PRN      oxyCODONE-acetaminophen 5-325 MG per tablet  Commonly known as:  PERCOCET   2 tablets, Oral, Every 4 Hours PRN         Continue These Medications      Instructions Start Date   ferrous sulfate 325 (65 FE) MG tablet   325 mg, Oral, Daily With Breakfast      prenatal (CLASSIC) vitamin 28-0.8 MG tablet tablet   1 tablet, Oral, Daily               The patient has been prescribed a controlled substance.  She has been counseled on the risks associated with using the medication.   The addictive potential of this medication and alternatives were discussed carefully with this patient and she demonstrated understanding.  A ARIANA report has been obtained and reviewed.       Activity at Discharge:     Follow-up Appointments  No future appointments.  Additional Instructions for  the Follow-ups that You Need to Schedule     Call MD With Problems / Concerns   As directed      Instructions: call for fever, increased vaginal bleeding or pain, any other concerns    Order Comments:  Instructions: call for fever, increased vaginal bleeding or pain, any other concerns                Test Results Pending at Discharge       KINSEY Hanna  12/04/18  9:51 AM

## 2018-12-13 ENCOUNTER — TELEPHONE (OUTPATIENT)
Dept: LACTATION | Facility: HOSPITAL | Age: 23
End: 2018-12-13

## 2021-04-16 ENCOUNTER — BULK ORDERING (OUTPATIENT)
Dept: CASE MANAGEMENT | Facility: OTHER | Age: 26
End: 2021-04-16

## 2021-04-16 DIAGNOSIS — Z23 IMMUNIZATION DUE: ICD-10-CM

## 2021-07-09 LAB
EXTERNAL HEPATITIS B SURFACE ANTIGEN: NEGATIVE
EXTERNAL HEPATITIS C AB: NORMAL
EXTERNAL RUBELLA QUALITATIVE: NORMAL
EXTERNAL SYPHILIS RPR SCREEN: NORMAL
HIV1 P24 AG SERPL QL IA: NORMAL

## 2021-08-08 ENCOUNTER — APPOINTMENT (OUTPATIENT)
Dept: GENERAL RADIOLOGY | Facility: HOSPITAL | Age: 26
End: 2021-08-08

## 2021-08-08 ENCOUNTER — APPOINTMENT (OUTPATIENT)
Dept: CT IMAGING | Facility: HOSPITAL | Age: 26
End: 2021-08-08

## 2021-08-08 ENCOUNTER — APPOINTMENT (OUTPATIENT)
Dept: CARDIOLOGY | Facility: HOSPITAL | Age: 26
End: 2021-08-08

## 2021-08-08 ENCOUNTER — HOSPITAL ENCOUNTER (INPATIENT)
Facility: HOSPITAL | Age: 26
LOS: 5 days | Discharge: HOME | End: 2021-08-13
Attending: EMERGENCY MEDICINE | Admitting: INTERNAL MEDICINE

## 2021-08-08 DIAGNOSIS — J12.82 PNEUMONIA DUE TO COVID-19 VIRUS: Primary | ICD-10-CM

## 2021-08-08 DIAGNOSIS — Z3A.09 9 WEEKS GESTATION OF PREGNANCY: ICD-10-CM

## 2021-08-08 DIAGNOSIS — U07.1 PNEUMONIA DUE TO COVID-19 VIRUS: Primary | ICD-10-CM

## 2021-08-08 LAB
ALBUMIN SERPL-MCNC: 4.1 G/DL (ref 3.5–5.2)
ALBUMIN/GLOB SERPL: 1.3 G/DL
ALP SERPL-CCNC: 68 U/L (ref 39–117)
ALT SERPL W P-5'-P-CCNC: 21 U/L (ref 1–33)
ANION GAP SERPL CALCULATED.3IONS-SCNC: 13.4 MMOL/L (ref 5–15)
AST SERPL-CCNC: 33 U/L (ref 1–32)
B PARAPERT DNA SPEC QL NAA+PROBE: NOT DETECTED
B PERT DNA SPEC QL NAA+PROBE: NOT DETECTED
BACTERIA UR QL AUTO: ABNORMAL /HPF
BASOPHILS # BLD AUTO: 0.01 10*3/MM3 (ref 0–0.2)
BASOPHILS NFR BLD AUTO: 0.3 % (ref 0–1.5)
BH CV LOWER VASCULAR LEFT COMMON FEMORAL AUGMENT: NORMAL
BH CV LOWER VASCULAR LEFT COMMON FEMORAL COMPETENT: NORMAL
BH CV LOWER VASCULAR LEFT COMMON FEMORAL COMPRESS: NORMAL
BH CV LOWER VASCULAR LEFT COMMON FEMORAL PHASIC: NORMAL
BH CV LOWER VASCULAR LEFT COMMON FEMORAL SPONT: NORMAL
BH CV LOWER VASCULAR LEFT DISTAL FEMORAL COMPRESS: NORMAL
BH CV LOWER VASCULAR LEFT GASTRONEMIUS COMPRESS: NORMAL
BH CV LOWER VASCULAR LEFT GREATER SAPH AK COMPRESS: NORMAL
BH CV LOWER VASCULAR LEFT GREATER SAPH BK COMPRESS: NORMAL
BH CV LOWER VASCULAR LEFT LESSER SAPH COMPRESS: NORMAL
BH CV LOWER VASCULAR LEFT MID FEMORAL AUGMENT: NORMAL
BH CV LOWER VASCULAR LEFT MID FEMORAL COMPETENT: NORMAL
BH CV LOWER VASCULAR LEFT MID FEMORAL COMPRESS: NORMAL
BH CV LOWER VASCULAR LEFT MID FEMORAL PHASIC: NORMAL
BH CV LOWER VASCULAR LEFT MID FEMORAL SPONT: NORMAL
BH CV LOWER VASCULAR LEFT PERONEAL COMPRESS: NORMAL
BH CV LOWER VASCULAR LEFT POPLITEAL AUGMENT: NORMAL
BH CV LOWER VASCULAR LEFT POPLITEAL COMPETENT: NORMAL
BH CV LOWER VASCULAR LEFT POPLITEAL COMPRESS: NORMAL
BH CV LOWER VASCULAR LEFT POPLITEAL PHASIC: NORMAL
BH CV LOWER VASCULAR LEFT POPLITEAL SPONT: NORMAL
BH CV LOWER VASCULAR LEFT POSTERIOR TIBIAL COMPRESS: NORMAL
BH CV LOWER VASCULAR LEFT PROFUNDA FEMORAL COMPRESS: NORMAL
BH CV LOWER VASCULAR LEFT PROXIMAL FEMORAL COMPRESS: NORMAL
BH CV LOWER VASCULAR LEFT SAPHENOFEMORAL JUNCTION COMPRESS: NORMAL
BH CV LOWER VASCULAR RIGHT COMMON FEMORAL AUGMENT: NORMAL
BH CV LOWER VASCULAR RIGHT COMMON FEMORAL COMPETENT: NORMAL
BH CV LOWER VASCULAR RIGHT COMMON FEMORAL COMPRESS: NORMAL
BH CV LOWER VASCULAR RIGHT COMMON FEMORAL PHASIC: NORMAL
BH CV LOWER VASCULAR RIGHT COMMON FEMORAL SPONT: NORMAL
BH CV LOWER VASCULAR RIGHT DISTAL FEMORAL COMPRESS: NORMAL
BH CV LOWER VASCULAR RIGHT GASTRONEMIUS COMPRESS: NORMAL
BH CV LOWER VASCULAR RIGHT GREATER SAPH AK COMPRESS: NORMAL
BH CV LOWER VASCULAR RIGHT GREATER SAPH BK COMPRESS: NORMAL
BH CV LOWER VASCULAR RIGHT LESSER SAPH COMPRESS: NORMAL
BH CV LOWER VASCULAR RIGHT MID FEMORAL AUGMENT: NORMAL
BH CV LOWER VASCULAR RIGHT MID FEMORAL COMPETENT: NORMAL
BH CV LOWER VASCULAR RIGHT MID FEMORAL COMPRESS: NORMAL
BH CV LOWER VASCULAR RIGHT MID FEMORAL PHASIC: NORMAL
BH CV LOWER VASCULAR RIGHT MID FEMORAL SPONT: NORMAL
BH CV LOWER VASCULAR RIGHT PERONEAL COMPRESS: NORMAL
BH CV LOWER VASCULAR RIGHT POPLITEAL AUGMENT: NORMAL
BH CV LOWER VASCULAR RIGHT POPLITEAL COMPETENT: NORMAL
BH CV LOWER VASCULAR RIGHT POPLITEAL COMPRESS: NORMAL
BH CV LOWER VASCULAR RIGHT POPLITEAL PHASIC: NORMAL
BH CV LOWER VASCULAR RIGHT POPLITEAL SPONT: NORMAL
BH CV LOWER VASCULAR RIGHT POSTERIOR TIBIAL COMPRESS: NORMAL
BH CV LOWER VASCULAR RIGHT PROFUNDA FEMORAL COMPRESS: NORMAL
BH CV LOWER VASCULAR RIGHT PROXIMAL FEMORAL COMPRESS: NORMAL
BH CV LOWER VASCULAR RIGHT SAPHENOFEMORAL JUNCTION COMPRESS: NORMAL
BILIRUB SERPL-MCNC: 0.6 MG/DL (ref 0–1.2)
BILIRUB UR QL STRIP: NEGATIVE
BUN SERPL-MCNC: 4 MG/DL (ref 6–20)
BUN/CREAT SERPL: 6.8 (ref 7–25)
C PNEUM DNA NPH QL NAA+NON-PROBE: NOT DETECTED
CALCIUM SPEC-SCNC: 8.8 MG/DL (ref 8.6–10.5)
CHLORIDE SERPL-SCNC: 97 MMOL/L (ref 98–107)
CLARITY UR: CLEAR
CO2 SERPL-SCNC: 22.6 MMOL/L (ref 22–29)
COLOR UR: ABNORMAL
CREAT SERPL-MCNC: 0.59 MG/DL (ref 0.57–1)
D DIMER PPP FEU-MCNC: 0.91 MCGFEU/ML (ref 0–0.49)
D-LACTATE SERPL-SCNC: 1.4 MMOL/L (ref 0.5–2)
DEPRECATED RDW RBC AUTO: 44.7 FL (ref 37–54)
EOSINOPHIL # BLD AUTO: 0 10*3/MM3 (ref 0–0.4)
EOSINOPHIL NFR BLD AUTO: 0 % (ref 0.3–6.2)
ERYTHROCYTE [DISTWIDTH] IN BLOOD BY AUTOMATED COUNT: 14.8 % (ref 12.3–15.4)
FLUAV SUBTYP SPEC NAA+PROBE: NOT DETECTED
FLUBV RNA ISLT QL NAA+PROBE: NOT DETECTED
GFR SERPL CREATININE-BSD FRML MDRD: 149 ML/MIN/1.73
GLOBULIN UR ELPH-MCNC: 3.2 GM/DL
GLUCOSE SERPL-MCNC: 87 MG/DL (ref 65–99)
GLUCOSE UR STRIP-MCNC: NEGATIVE MG/DL
HADV DNA SPEC NAA+PROBE: NOT DETECTED
HCG INTACT+B SERPL-ACNC: NORMAL MIU/ML
HCOV 229E RNA SPEC QL NAA+PROBE: NOT DETECTED
HCOV HKU1 RNA SPEC QL NAA+PROBE: NOT DETECTED
HCOV NL63 RNA SPEC QL NAA+PROBE: NOT DETECTED
HCOV OC43 RNA SPEC QL NAA+PROBE: NOT DETECTED
HCT VFR BLD AUTO: 35.9 % (ref 34–46.6)
HGB BLD-MCNC: 11.6 G/DL (ref 12–15.9)
HGB UR QL STRIP.AUTO: NEGATIVE
HMPV RNA NPH QL NAA+NON-PROBE: NOT DETECTED
HPIV1 RNA SPEC QL NAA+PROBE: NOT DETECTED
HPIV2 RNA SPEC QL NAA+PROBE: NOT DETECTED
HPIV3 RNA NPH QL NAA+PROBE: NOT DETECTED
HPIV4 P GENE NPH QL NAA+PROBE: NOT DETECTED
HYALINE CASTS UR QL AUTO: ABNORMAL /LPF
IMM GRANULOCYTES # BLD AUTO: 0.01 10*3/MM3 (ref 0–0.05)
IMM GRANULOCYTES NFR BLD AUTO: 0.3 % (ref 0–0.5)
INR PPP: 1.17 (ref 0.9–1.1)
KETONES UR QL STRIP: ABNORMAL
LEUKOCYTE ESTERASE UR QL STRIP.AUTO: NEGATIVE
LYMPHOCYTES # BLD AUTO: 1.03 10*3/MM3 (ref 0.7–3.1)
LYMPHOCYTES NFR BLD AUTO: 29.5 % (ref 19.6–45.3)
M PNEUMO IGG SER IA-ACNC: NOT DETECTED
MAGNESIUM SERPL-MCNC: 2.2 MG/DL (ref 1.6–2.6)
MCH RBC QN AUTO: 26.7 PG (ref 26.6–33)
MCHC RBC AUTO-ENTMCNC: 32.3 G/DL (ref 31.5–35.7)
MCV RBC AUTO: 82.7 FL (ref 79–97)
MONOCYTES # BLD AUTO: 0.21 10*3/MM3 (ref 0.1–0.9)
MONOCYTES NFR BLD AUTO: 6 % (ref 5–12)
NEUTROPHILS NFR BLD AUTO: 2.23 10*3/MM3 (ref 1.7–7)
NEUTROPHILS NFR BLD AUTO: 63.9 % (ref 42.7–76)
NITRITE UR QL STRIP: NEGATIVE
NRBC BLD AUTO-RTO: 0 /100 WBC (ref 0–0.2)
NT-PROBNP SERPL-MCNC: 8.2 PG/ML (ref 0–450)
PH UR STRIP.AUTO: 6 [PH] (ref 5–8)
PLATELET # BLD AUTO: 220 10*3/MM3 (ref 140–450)
PMV BLD AUTO: 10.5 FL (ref 6–12)
POTASSIUM SERPL-SCNC: 3 MMOL/L (ref 3.5–5.2)
PROCALCITONIN SERPL-MCNC: 0.09 NG/ML (ref 0–0.25)
PROT SERPL-MCNC: 7.3 G/DL (ref 6–8.5)
PROT UR QL STRIP: ABNORMAL
PROTHROMBIN TIME: 14.8 SECONDS (ref 11.7–14.2)
QT INTERVAL: 363 MS
RBC # BLD AUTO: 4.34 10*6/MM3 (ref 3.77–5.28)
RBC # UR: ABNORMAL /HPF
REF LAB TEST METHOD: ABNORMAL
RHINOVIRUS RNA SPEC NAA+PROBE: NOT DETECTED
RSV RNA NPH QL NAA+NON-PROBE: NOT DETECTED
SARS-COV-2 RNA NPH QL NAA+NON-PROBE: DETECTED
SODIUM SERPL-SCNC: 133 MMOL/L (ref 136–145)
SP GR UR STRIP: 1.03 (ref 1–1.03)
SQUAMOUS #/AREA URNS HPF: ABNORMAL /HPF
TROPONIN T SERPL-MCNC: <0.01 NG/ML (ref 0–0.03)
UROBILINOGEN UR QL STRIP: ABNORMAL
WBC # BLD AUTO: 3.49 10*3/MM3 (ref 3.4–10.8)
WBC UR QL AUTO: ABNORMAL /HPF

## 2021-08-08 PROCEDURE — 85379 FIBRIN DEGRADATION QUANT: CPT | Performed by: EMERGENCY MEDICINE

## 2021-08-08 PROCEDURE — 84484 ASSAY OF TROPONIN QUANT: CPT | Performed by: EMERGENCY MEDICINE

## 2021-08-08 PROCEDURE — 71275 CT ANGIOGRAPHY CHEST: CPT

## 2021-08-08 PROCEDURE — 87040 BLOOD CULTURE FOR BACTERIA: CPT | Performed by: EMERGENCY MEDICINE

## 2021-08-08 PROCEDURE — 83605 ASSAY OF LACTIC ACID: CPT | Performed by: EMERGENCY MEDICINE

## 2021-08-08 PROCEDURE — 85025 COMPLETE CBC W/AUTO DIFF WBC: CPT | Performed by: EMERGENCY MEDICINE

## 2021-08-08 PROCEDURE — 83880 ASSAY OF NATRIURETIC PEPTIDE: CPT | Performed by: EMERGENCY MEDICINE

## 2021-08-08 PROCEDURE — 0 IOPAMIDOL PER 1 ML: Performed by: EMERGENCY MEDICINE

## 2021-08-08 PROCEDURE — 81001 URINALYSIS AUTO W/SCOPE: CPT | Performed by: EMERGENCY MEDICINE

## 2021-08-08 PROCEDURE — 93005 ELECTROCARDIOGRAM TRACING: CPT | Performed by: EMERGENCY MEDICINE

## 2021-08-08 PROCEDURE — 71045 X-RAY EXAM CHEST 1 VIEW: CPT

## 2021-08-08 PROCEDURE — 80053 COMPREHEN METABOLIC PANEL: CPT | Performed by: EMERGENCY MEDICINE

## 2021-08-08 PROCEDURE — 93010 ELECTROCARDIOGRAM REPORT: CPT | Performed by: INTERNAL MEDICINE

## 2021-08-08 PROCEDURE — 25010000002 CEFTRIAXONE PER 250 MG: Performed by: EMERGENCY MEDICINE

## 2021-08-08 PROCEDURE — 94799 UNLISTED PULMONARY SVC/PX: CPT

## 2021-08-08 PROCEDURE — 99285 EMERGENCY DEPT VISIT HI MDM: CPT

## 2021-08-08 PROCEDURE — 0202U NFCT DS 22 TRGT SARS-COV-2: CPT | Performed by: EMERGENCY MEDICINE

## 2021-08-08 PROCEDURE — 84145 PROCALCITONIN (PCT): CPT | Performed by: EMERGENCY MEDICINE

## 2021-08-08 PROCEDURE — 85610 PROTHROMBIN TIME: CPT | Performed by: EMERGENCY MEDICINE

## 2021-08-08 PROCEDURE — 83735 ASSAY OF MAGNESIUM: CPT | Performed by: EMERGENCY MEDICINE

## 2021-08-08 PROCEDURE — 84702 CHORIONIC GONADOTROPIN TEST: CPT | Performed by: EMERGENCY MEDICINE

## 2021-08-08 PROCEDURE — 93970 EXTREMITY STUDY: CPT

## 2021-08-08 RX ORDER — SODIUM CHLORIDE 0.9 % (FLUSH) 0.9 %
10 SYRINGE (ML) INJECTION AS NEEDED
Status: DISCONTINUED | OUTPATIENT
Start: 2021-08-08 | End: 2021-08-13 | Stop reason: HOSPADM

## 2021-08-08 RX ORDER — FERROUS SULFATE 325(65) MG
325 TABLET ORAL
Status: DISCONTINUED | OUTPATIENT
Start: 2021-08-09 | End: 2021-08-13 | Stop reason: HOSPADM

## 2021-08-08 RX ORDER — VALACYCLOVIR HYDROCHLORIDE 500 MG/1
500 TABLET, FILM COATED ORAL 2 TIMES DAILY
COMMUNITY

## 2021-08-08 RX ORDER — ACETAMINOPHEN 500 MG
1000 TABLET ORAL ONCE
Status: COMPLETED | OUTPATIENT
Start: 2021-08-08 | End: 2021-08-08

## 2021-08-08 RX ORDER — IBUPROFEN 400 MG/1
600 TABLET ORAL EVERY 8 HOURS PRN
Status: DISCONTINUED | OUTPATIENT
Start: 2021-08-08 | End: 2021-08-13 | Stop reason: HOSPADM

## 2021-08-08 RX ORDER — ACETAMINOPHEN 325 MG/1
650 TABLET ORAL EVERY 4 HOURS PRN
Status: DISCONTINUED | OUTPATIENT
Start: 2021-08-08 | End: 2021-08-09

## 2021-08-08 RX ORDER — ONDANSETRON 2 MG/ML
4 INJECTION INTRAMUSCULAR; INTRAVENOUS EVERY 6 HOURS PRN
Status: DISCONTINUED | OUTPATIENT
Start: 2021-08-08 | End: 2021-08-13 | Stop reason: HOSPADM

## 2021-08-08 RX ORDER — UREA 10 %
3 LOTION (ML) TOPICAL NIGHTLY PRN
Status: DISCONTINUED | OUTPATIENT
Start: 2021-08-08 | End: 2021-08-08

## 2021-08-08 RX ORDER — PRENATAL VIT/IRON FUM/FOLIC AC 27MG-0.8MG
1 TABLET ORAL DAILY
Status: DISCONTINUED | OUTPATIENT
Start: 2021-08-08 | End: 2021-08-13 | Stop reason: HOSPADM

## 2021-08-08 RX ORDER — POTASSIUM CHLORIDE 750 MG/1
40 TABLET, FILM COATED, EXTENDED RELEASE ORAL ONCE
Status: COMPLETED | OUTPATIENT
Start: 2021-08-08 | End: 2021-08-08

## 2021-08-08 RX ORDER — NITROGLYCERIN 0.4 MG/1
0.4 TABLET SUBLINGUAL
Status: DISCONTINUED | OUTPATIENT
Start: 2021-08-08 | End: 2021-08-08

## 2021-08-08 RX ORDER — ONDANSETRON 4 MG/1
4 TABLET, FILM COATED ORAL EVERY 6 HOURS PRN
Status: DISCONTINUED | OUTPATIENT
Start: 2021-08-08 | End: 2021-08-13 | Stop reason: HOSPADM

## 2021-08-08 RX ORDER — CEFTRIAXONE SODIUM 1 G/50ML
1 INJECTION, SOLUTION INTRAVENOUS ONCE
Status: COMPLETED | OUTPATIENT
Start: 2021-08-08 | End: 2021-08-08

## 2021-08-08 RX ADMIN — ACETAMINOPHEN 650 MG: 325 TABLET, FILM COATED ORAL at 20:39

## 2021-08-08 RX ADMIN — POTASSIUM CHLORIDE 40 MEQ: 750 TABLET, EXTENDED RELEASE ORAL at 14:10

## 2021-08-08 RX ADMIN — SODIUM CHLORIDE 1000 ML: 9 INJECTION, SOLUTION INTRAVENOUS at 15:37

## 2021-08-08 RX ADMIN — IOPAMIDOL 93 ML: 755 INJECTION, SOLUTION INTRAVENOUS at 14:41

## 2021-08-08 RX ADMIN — CEFTRIAXONE SODIUM 1 G: 1 INJECTION, SOLUTION INTRAVENOUS at 13:29

## 2021-08-08 RX ADMIN — Medication 1 TABLET: at 22:21

## 2021-08-08 RX ADMIN — ACETAMINOPHEN 1000 MG: 500 TABLET, FILM COATED ORAL at 14:12

## 2021-08-08 RX ADMIN — SODIUM CHLORIDE 1000 ML: 9 INJECTION, SOLUTION INTRAVENOUS at 12:33

## 2021-08-08 NOTE — ED PROVIDER NOTES
EMERGENCY DEPARTMENT ENCOUNTER    Room Number:  S616/1  Date of encounter:  2021  PCP: Ynes Andres MD  Historian: Patient      HPI:  Chief Complaint: I have COVID and I am feeling worse  A complete HPI/ROS/PMH/PSH/SH/FH are unobtainable due to: Not applicable  Context: Regine Martins is a 25 y.o. female who presents to the ED c/o patient symptoms started approximately 1 week ago with headache, mild sore throat, some mild nasal drainage. Symptoms then progressed to a cough. Cough is mainly dry and nonproductive. Body aches and fatigue has continued and progressed. She has developed shortness of breath the past several days. Shortness of breath is worse with exertion. Currently sitting in the bed she denies being short of breath. She currently is pregnant at 9 weeks. She is . Her first pregnancy was normal spontaneous vaginal delivery at term. She denies any history of lung problems or heart problems. Denies any complications with this pregnancy. Denies any pelvic pain or vaginal bleeding. She has had a little amount of diarrhea. She has no appetite and has been drinking and eating much less. Patient tested positive for Covid on 2021. She has not been vaccinated.        Previous Episodes: No  Current Symptoms: See above    MEDICAL HISTORY REVIEWED  Currently pregnant  at 9 weeks. She does have a history of migraines in the past as well.      PAST MEDICAL HISTORY  Active Ambulatory Problems     Diagnosis Date Noted   • Anemia 2018     Resolved Ambulatory Problems     Diagnosis Date Noted   • Pregnancy 2018     Past Medical History:   Diagnosis Date   • Abnormal Pap smear of cervix    • Migraine          PAST SURGICAL HISTORY  Past Surgical History:   Procedure Laterality Date   • FOOT SURGERY      Per pt, to remove bones; had 2 procedures         FAMILY HISTORY  History reviewed. No pertinent family history.      SOCIAL HISTORY  Social History     Socioeconomic History    • Marital status: Single     Spouse name: Not on file   • Number of children: Not on file   • Years of education: Not on file   • Highest education level: Not on file   Tobacco Use   • Smoking status: Never Smoker   • Smokeless tobacco: Never Used   Substance and Sexual Activity   • Alcohol use: No   • Drug use: No   • Sexual activity: Defer         ALLERGIES  Patient has no known allergies.        REVIEW OF SYSTEMS  Review of Systems     All systems reviewed and negative except for those discussed in HPI.       PHYSICAL EXAM    I have reviewed the triage vital signs and nursing notes.    ED Triage Vitals [08/08/21 1106]   Temp Heart Rate Resp BP SpO2   98.4 °F (36.9 °C) 111 16 -- 95 %      Temp src Heart Rate Source Patient Position BP Location FiO2 (%)   Tympanic Monitor -- -- --       GENERAL: Young female appears weak and tired, but no acute distress.Vital signs on my initial evaluation O2 sat sitting still is 9697% on room air. When she ambulates in the room her O2 sat goes down to 94 and 93%. She does have a mild cough on exam that is nonproductive. She has mild tachycardia and her blood pressure is normal.  HENT: nares patent  Head/neck/ face are symmetric without gross deformity, signs of trauma, or swelling  EYES: no scleral icterus, no conjunctival pallor.  NECK: Supple, no meningismus  CV: regular rhythm, regular rate with intact distal pulses. No murmur or rub  RESPIRATORY: Patient has some mild tachypnea. Respiratory rate is in the low 20s. She has a mild cough on exam. She has crackles and rhonchi posteriorly in the base of her lungs bilaterally.  ABDOMEN: soft and nontender. Obese  MUSCULOSKELETAL: no deformity. No edema. Intact distal pulses to upper and lower extremities that are equal strong and symmetric.  NEURO: alert and appropriate, moves all extremities, follows commands. No focal motor or sensory changes.  SKIN: warm, dry    Vital signs and nursing notes reviewed.        LAB RESULTS  Recent  Results (from the past 24 hour(s))   Comprehensive Metabolic Panel    Collection Time: 08/08/21 12:01 PM    Specimen: Blood   Result Value Ref Range    Glucose 87 65 - 99 mg/dL    BUN 4 (L) 6 - 20 mg/dL    Creatinine 0.59 0.57 - 1.00 mg/dL    Sodium 133 (L) 136 - 145 mmol/L    Potassium 3.0 (L) 3.5 - 5.2 mmol/L    Chloride 97 (L) 98 - 107 mmol/L    CO2 22.6 22.0 - 29.0 mmol/L    Calcium 8.8 8.6 - 10.5 mg/dL    Total Protein 7.3 6.0 - 8.5 g/dL    Albumin 4.10 3.50 - 5.20 g/dL    ALT (SGPT) 21 1 - 33 U/L    AST (SGOT) 33 (H) 1 - 32 U/L    Alkaline Phosphatase 68 39 - 117 U/L    Total Bilirubin 0.6 0.0 - 1.2 mg/dL    eGFR  African Amer 149 >60 mL/min/1.73    Globulin 3.2 gm/dL    A/G Ratio 1.3 g/dL    BUN/Creatinine Ratio 6.8 (L) 7.0 - 25.0    Anion Gap 13.4 5.0 - 15.0 mmol/L   Respiratory Panel PCR w/COVID-19(SARS-CoV-2) THUY/SARAH/OMAIRA/PAD/COR/MAD/ERICK In-House, NP Swab in [facility], 3-4 HR TAT - Swab, Nasopharynx    Collection Time: 08/08/21 12:01 PM    Specimen: Nasopharynx; Swab   Result Value Ref Range    ADENOVIRUS, PCR Not Detected Not Detected    Coronavirus 229E Not Detected Not Detected    Coronavirus HKU1 Not Detected Not Detected    Coronavirus NL63 Not Detected Not Detected    Coronavirus OC43 Not Detected Not Detected    COVID19 Detected (C) Not Detected - Ref. Range    Human Metapneumovirus Not Detected Not Detected    Human Rhinovirus/Enterovirus Not Detected Not Detected    Influenza A PCR Not Detected Not Detected    Influenza B PCR Not Detected Not Detected    Parainfluenza Virus 1 Not Detected Not Detected    Parainfluenza Virus 2 Not Detected Not Detected    Parainfluenza Virus 3 Not Detected Not Detected    Parainfluenza Virus 4 Not Detected Not Detected    RSV, PCR Not Detected Not Detected    Bordetella pertussis pcr Not Detected Not Detected    Bordetella parapertussis PCR Not Detected Not Detected    Chlamydophila pneumoniae PCR Not Detected Not Detected    Mycoplasma pneumo by PCR Not Detected Not  Detected   Protime-INR    Collection Time: 08/08/21 12:01 PM    Specimen: Blood   Result Value Ref Range    Protime 14.8 (H) 11.7 - 14.2 Seconds    INR 1.17 (H) 0.90 - 1.10   Magnesium    Collection Time: 08/08/21 12:01 PM    Specimen: Blood   Result Value Ref Range    Magnesium 2.2 1.6 - 2.6 mg/dL   BNP    Collection Time: 08/08/21 12:01 PM    Specimen: Blood   Result Value Ref Range    proBNP 8.2 0.0 - 450.0 pg/mL   Troponin    Collection Time: 08/08/21 12:01 PM    Specimen: Blood   Result Value Ref Range    Troponin T <0.010 0.000 - 0.030 ng/mL   CBC Auto Differential    Collection Time: 08/08/21 12:01 PM    Specimen: Blood   Result Value Ref Range    WBC 3.49 3.40 - 10.80 10*3/mm3    RBC 4.34 3.77 - 5.28 10*6/mm3    Hemoglobin 11.6 (L) 12.0 - 15.9 g/dL    Hematocrit 35.9 34.0 - 46.6 %    MCV 82.7 79.0 - 97.0 fL    MCH 26.7 26.6 - 33.0 pg    MCHC 32.3 31.5 - 35.7 g/dL    RDW 14.8 12.3 - 15.4 %    RDW-SD 44.7 37.0 - 54.0 fl    MPV 10.5 6.0 - 12.0 fL    Platelets 220 140 - 450 10*3/mm3    Neutrophil % 63.9 42.7 - 76.0 %    Lymphocyte % 29.5 19.6 - 45.3 %    Monocyte % 6.0 5.0 - 12.0 %    Eosinophil % 0.0 (L) 0.3 - 6.2 %    Basophil % 0.3 0.0 - 1.5 %    Immature Grans % 0.3 0.0 - 0.5 %    Neutrophils, Absolute 2.23 1.70 - 7.00 10*3/mm3    Lymphocytes, Absolute 1.03 0.70 - 3.10 10*3/mm3    Monocytes, Absolute 0.21 0.10 - 0.90 10*3/mm3    Eosinophils, Absolute 0.00 0.00 - 0.40 10*3/mm3    Basophils, Absolute 0.01 0.00 - 0.20 10*3/mm3    Immature Grans, Absolute 0.01 0.00 - 0.05 10*3/mm3    nRBC 0.0 0.0 - 0.2 /100 WBC   D-dimer, Quantitative    Collection Time: 08/08/21 12:01 PM    Specimen: Blood   Result Value Ref Range    D-Dimer, Quantitative 0.91 (H) 0.00 - 0.49 MCGFEU/mL   Procalcitonin    Collection Time: 08/08/21 12:01 PM    Specimen: Blood   Result Value Ref Range    Procalcitonin 0.09 0.00 - 0.25 ng/mL   ECG 12 Lead    Collection Time: 08/08/21 12:12 PM   Result Value Ref Range    QT Interval 363 ms   hCG,  Quantitative, Pregnancy    Collection Time: 08/08/21 12:31 PM    Specimen: Blood   Result Value Ref Range    HCG Quantitative 102,215.00 mIU/mL   Urinalysis With Microscopic If Indicated (No Culture) - Urine, Clean Catch    Collection Time: 08/08/21 12:31 PM    Specimen: Urine, Clean Catch   Result Value Ref Range    Color, UA Dark Yellow (A) Yellow, Straw    Appearance, UA Clear Clear    pH, UA 6.0 5.0 - 8.0    Specific Gravity, UA 1.026 1.005 - 1.030    Glucose, UA Negative Negative    Ketones, UA 80 mg/dL (3+) (A) Negative    Bilirubin, UA Negative Negative    Blood, UA Negative Negative    Protein, UA 30 mg/dL (1+) (A) Negative    Leuk Esterase, UA Negative Negative    Nitrite, UA Negative Negative    Urobilinogen, UA 2.0 E.U./dL (A) 0.2 - 1.0 E.U./dL   Urinalysis, Microscopic Only - Urine, Clean Catch    Collection Time: 08/08/21 12:31 PM    Specimen: Urine, Clean Catch   Result Value Ref Range    RBC, UA None Seen None Seen, 0-2 /HPF    WBC, UA None Seen None Seen, 0-2 /HPF    Bacteria, UA 1+ (A) None Seen /HPF    Squamous Epithelial Cells, UA 3-6 (A) None Seen, 0-2 /HPF    Hyaline Casts, UA None Seen None Seen /LPF    Methodology Manual Light Microscopy    Lactic Acid, Plasma    Collection Time: 08/08/21  1:28 PM    Specimen: Blood   Result Value Ref Range    Lactate 1.4 0.5 - 2.0 mmol/L   Duplex Venous Lower Extremity - Bilateral CAR    Collection Time: 08/08/21  6:21 PM   Result Value Ref Range    Right Common Femoral Spont Y     Right Common Femoral Phasic Y     Right Common Femoral Augment Y     Right Common Femoral Competent Y     Right Common Femoral Compress C     Right Saphenofemoral Junction Compress C     Right Profunda Femoral Compress C     Right Proximal Femoral Compress C     Right Mid Femoral Spont Y     Right Mid Femoral Phasic Y     Right Mid Femoral Augment Y     Right Mid Femoral Competent Y     Right Mid Femoral Compress C     Right Distal Femoral Compress C     Right Popliteal Spont Y      Right Popliteal Phasic Y     Right Popliteal Augment Y     Right Popliteal Competent Y     Right Popliteal Compress C     Right Posterior Tibial Compress C     Right Peroneal Compress C     Right GastronemiusSoleal Compress C     Right Greater Saph AK Compress C     Right Greater Saph BK Compress C     Right Lesser Saph Compress C     Left Common Femoral Spont Y     Left Common Femoral Phasic Y     Left Common Femoral Augment Y     Left Common Femoral Competent Y     Left Common Femoral Compress C     Left Saphenofemoral Junction Compress C     Left Profunda Femoral Compress C     Left Proximal Femoral Compress C     Left Mid Femoral Spont Y     Left Mid Femoral Phasic Y     Left Mid Femoral Augment Y     Left Mid Femoral Competent Y     Left Mid Femoral Compress C     Left Distal Femoral Compress C     Left Popliteal Spont Y     Left Popliteal Phasic Y     Left Popliteal Augment Y     Left Popliteal Competent Y     Left Popliteal Compress C     Left Posterior Tibial Compress C     Left Peroneal Compress C     Left GastronemiusSoleal Compress C     Left Greater Saph AK Compress C     Left Greater Saph BK Compress C     Left Lesser Saph Compress C        Ordered the above labs and independently reviewed the results.        RADIOLOGY  XR Chest 1 View    Result Date: 8/8/2021  ONE VIEW PORTABLE CHEST  HISTORY: COVID-19 pneumonia. Shortness of breath.  FINDINGS: The lungs are moderately expanded with scattered areas of pneumonia bilaterally, left greater than right and consistent with COVID-19 pneumonia. The heart size is normal.  This report was finalized on 8/8/2021 12:51 PM by Dr. Genaro Christopher M.D.      CT Angiogram Chest    Result Date: 8/8/2021  CT ANGIOGRAPHY OF THE CHEST WITH INTRAVENOUS CONTRAST AND 3D RECONSTRUCTIONS  HISTORY: COVID-19 pneumonia. Shortness of breath. Early pregnancy.  FINDINGS:  The CT scan was performed as an emergency procedure with CT angiography protocol using intravenous contrast and 3D  reconstructions. The following findings are present: 1. Because of the timing of contrast, there is only mild to moderate opacification of the pulmonary arteries. No definite pulmonary embolus is seen on this limited exam. These findings have been discussed with Dr. Salinas in the emergency room. The patient is apparently approximately 9 weeks pregnant and will have additional evaluation with venous Doppler but no additional contrast or CT scanning is being performed. 2. There is prominent scattered pneumonia in both lungs, particularly in the left upper lobe and consistent with COVID-19 pneumonia. There is no cavitation. There are no pleural effusions. 3. There is no mediastinal, hilar or axillary adenopathy. There is no pericardial effusion. The CT images through the upper liver, spleen and both adrenal glands are unremarkable.   Radiation dose reduction techniques were utilized, including automated exposure control and exposure modulation based on body size.  This report was finalized on 8/8/2021 4:21 PM by Dr. Genaro Christopher M.D.        I ordered the above noted radiological studies. Reviewed by me and discussed with radiologist.  See dictation for official radiology interpretation.      PROCEDURES    Critical Care  Performed by: Maximilian Salinas MD  Authorized by: Maximilian Salinas MD     Critical care provider statement:     Critical care time (minutes): 45.    Critical care time was exclusive of:  Separately billable procedures and treating other patients    Critical care was necessary to treat or prevent imminent or life-threatening deterioration of the following conditions:  Respiratory failure and sepsis    Critical care was time spent personally by me on the following activities:  Blood draw for specimens, development of treatment plan with patient or surrogate, discussions with consultants, evaluation of patient's response to treatment, examination of patient, ordering and performing treatments and  interventions, ordering and review of laboratory studies, ordering and review of radiographic studies, pulse oximetry, re-evaluation of patient's condition and review of old charts    I assumed direction of critical care for this patient from another provider in my specialty: no            MEDICATIONS GIVEN IN ER    Medications   sodium chloride 0.9 % flush 10 mL (has no administration in time range)   acetaminophen (TYLENOL) tablet 650 mg (has no administration in time range)   ondansetron (ZOFRAN) tablet 4 mg (has no administration in time range)     Or   ondansetron (ZOFRAN) injection 4 mg (has no administration in time range)   ferrous sulfate tablet 325 mg (has no administration in time range)   ibuprofen (ADVIL,MOTRIN) tablet 600 mg (has no administration in time range)   prenatal vitamin tablet 1 tablet (has no administration in time range)   sodium chloride 0.9 % bolus 1,000 mL (0 mL Intravenous Stopped 8/8/21 1410)   cefTRIAXone (ROCEPHIN) IVPB 1 g (0 g Intravenous Stopped 8/8/21 1410)   potassium chloride (K-DUR,KLOR-CON) ER tablet 40 mEq (40 mEq Oral Given 8/8/21 1410)   acetaminophen (TYLENOL) tablet 1,000 mg (1,000 mg Oral Given 8/8/21 1412)   sodium chloride 0.9 % bolus 1,000 mL (1,000 mL Intravenous New Bag 8/8/21 1537)   iopamidol (ISOVUE-370) 76 % injection 100 mL (93 mL Intravenous Given 8/8/21 1441)         PROGRESS, DATA ANALYSIS, CONSULTS, AND MEDICAL DECISION MAKING    This patient has Covid and Covid pneumonia. I have looked at the x-ray. Informed the patient that she will need to be admitted to the hospital and the test that we will order. All questions answered at this time. We will also can start her on some IV fluids. We will consult woman's first who is her  gynecologist. All questions answered at this time.    We are currently under a pandemic from the COVID19 infection.  The patient presented to the emergency department by ambulance or personal vehicle. I followed the current  protocols required by Infection Control at [facility] in my evaluation and treatment of the patient. The patient was wearing a face mask during my evaluation and throughout my encounter. During my whole encounter with this patient I used appropriate personal protective equipment.  This equipment consisted of eye protection, facemask, gown, and gloves.  I applied this equipment before entering the room.      All labs have been independently reviewed by me.  All radiology studies have been reviewed by me and discussed with radiologist dictating the report.   EKG's independently viewed and interpreted by me.  Discussion below represents my analysis of pertinent findings related to patient's condition, differential diagnosis, treatment plan and final disposition.      ED Course as of Aug 08 1936   Sun Aug 08, 2021   1241 I spoke with the nurse practitioner this on for woman's first.  She is on for Dr. Deborah Baker.  Informed her this patient will need to be admitted that she is pregnant in 9 weeks and has Covid pneumonia.  They would want the hospitalist to admit and they will consult.    [MM]   1241 D-Dimer, Quant(!): 0.91 [MM]   1241 EKG readingEKG was done at 1212It is a rate of 99 it is normal sinus rhythmIt is narrow complex with normal axisThere is diffuse nonspecific T wave changes    [MM]   1242 In several leadsPatient's QT is normal.  I do not have an old EKG to compare with.    [MM]   1244 I reviewed the chest x-ray and reviewed the radiologist report.  She has bilateral pneumonia left greater than the right.  Please see complete dictated report from the radiologist.    [MM]   1409 I discussed the case with the infectious disease doctor Dr. Harvey.  He will consult.  No emergent treatment needed at this time.  Patient is not hypoxic.  He agrees with the plan agrees with CT angiogram.    [MM]   1455 I discussed the results of the CT angiogram of the chest with Dr. Christopher.  It was not a good  study.  We know that she does not have any major branch pulmonary embolism.  No saddle pulmonary embolism.  The left to right heart ratio appears normal.  We know she has diffuse extensive pulmonary infiltrates consistent with Covid pneumonia.  Not offer much concerning the secondary branches of the pulmonary arteries.  Please see his complete dictated report.    [MM]   1459 This patient has reasons to explain her shortness of breath.  We do not see any major PE.  She is 9 weeks pregnant and I do not want to expose her to another dose of contrast and thus I feel that we can again anything.  I consulted with the infectious disease doctor Dr. Fairchild again.  The majority of people that do have pulmonary embolisms with Covid usually have DVTs.  We will get a Doppler her legs to make sure she has no DVT.  She does not complain of any extremity swelling or focal extremity pain.  She has some generalized fatigue and weakness and mild body aches.    [MM]   1513 I did speak with Dr. Vaughn who is on for A.  Informed her of the patient's presenting symptoms and results of test.  Informed her of the results of the CT scan.  We will get a Doppler her legs and if that Doppler is negative for DVT we will not proceed any further at this time.  She agrees to admit.  All questions are answered.    [MM]   1801 Patient is remained hemodynamically stable in the emergency department.  Patient's oxygen saturation on room air is been 95 to 97%.  Blood pressures been normal.    [MM]   1810 I discussed the case with the vascular tech Ronak.  Venous Doppler of the patient's bilateral lower extremities is negative for DVT.    [MM]      ED Course User Index  [MM] Maximilian Salinas MD       AS OF 19:36 EDT VITALS:    BP - 124/85  HR - 100  TEMP - (!) 101.1 °F (38.4 °C) (Oral)  02 SATS - 97%        DIAGNOSIS  Final diagnoses:   Pneumonia due to COVID-19 virus   9 weeks gestation of pregnancy         DISPOSITION  I have reviewed the test results  with my patient and explained the current treatment plan.  I answered all of the patient's questions.  The patient will be admitted to monitor bed at this time.  The patient is not hypotensive and is tolerating their current disease condition well enough for a monitored bed at this time.  The patient's current condition does not require intensive care treatment at this time.             Maximilian Salinas MD  08/08/21 1936

## 2021-08-08 NOTE — ED TRIAGE NOTES
Pt reports she was dx with covid on Wednesday and has had an increase in SOA. Pt reports she is 9wks pregnant.      Pt was wearing a mask during assessment.  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

## 2021-08-08 NOTE — ED NOTES
Patient wearing mask, nurse wearing mask, n95, protective eyewear, gown and gloves for care.  Hand hygiene performed prior to and post care.      Raj Lion RN  08/08/21 [id number]

## 2021-08-08 NOTE — H&P
HISTORY AND PHYSICAL   [facility]        Patient Identification:  Name: Regine Martins  Age: 25 y.o.  Sex: female  :  1995  MRN: [id number]                     Primary Care Physician: Ynes Andres MD    Chief Complaint:  25 year old female who presented to the emergency room with cough, shortness of breath and congestion for the last few days; she was also febrile to 101; she tested positive for covid last week and her sister is positive as well; she has had rhinorrhea; and some diarrhea; appetite has not been good;     History of Present Illness:   As above    Past Medical History:  Past Medical History:   Diagnosis Date   • Abnormal Pap smear of cervix    • Anemia    • Migraine      Past Surgical History:  Past Surgical History:   Procedure Laterality Date   • FOOT SURGERY      Per pt, to remove bones; had 2 procedures      Home Meds:  Medications Prior to Admission   Medication Sig Dispense Refill Last Dose   • ferrous sulfate 325 (65 FE) MG tablet Take 325 mg by mouth Daily With Breakfast.   Past Week at Unknown time   • ibuprofen (ADVIL,MOTRIN) 600 MG tablet Take 1 tablet by mouth Every 8 (Eight) Hours As Needed for Mild Pain . 30 tablet 3 Past Week at Unknown time   • Prenatal Vit-Fe Fumarate-FA (PRENATAL, CLASSIC, VITAMIN) 28-0.8 MG tablet tablet Take 1 tablet by mouth Daily.   Past Week at Unknown time   • valACYclovir (VALTREX) 500 MG tablet Take 500 mg by mouth 2 (Two) Times a Day.   Past Week at Unknown time       Allergies:  No Known Allergies  Immunizations:  Immunization History   Administered Date(s) Administered   • DTaP 09/10/2018   • Rho (D) Immune Globulin 09/10/2018, 2018     Social History:   Social History     Social History Narrative   • Not on file     Social History     Socioeconomic History   • Marital status: Single     Spouse name: Not on file   • Number of children: Not on file   • Years of education: Not on file   • Highest education level: Not  "on file   Tobacco Use   • Smoking status: Never Smoker   • Smokeless tobacco: Never Used   Substance and Sexual Activity   • Alcohol use: No   • Drug use: No   • Sexual activity: Defer       Family History:  History reviewed. No pertinent family history.     Review of Systems  See history of present illness and past medical history.  Patient denies headache, dizziness, syncope, falls, trauma, change in vision, change in hearing, change in taste, changes in weight, changes in appetite, focal weakness, numbness, or paresthesia.  Patient denies chest pain,  orthopnea, PND, cough, sinus pressure, rhinorrhea, epistaxis, hemoptysis, nausea, vomiting,hematemesis, diarrhea, constipation or hematchezia.  Denies cold or heat intolerance, polydipsia, polyuria, polyphagia. Denies hematuria, pyuria, dysuria, hesitancy, frequency or urgency. Denies consumption of raw and under cooked meats foods or change in water source.  Denies fever, chills, sweats, night sweats.  Denies missing any routine medications. Remainder of ROS is negative.    Objective:  T Max 24 hrs: Temp (24hrs), Av.1 °F (37.8 °C), Min:98.4 °F (36.9 °C), Max:101.5 °F (38.6 °C)    Vitals Ranges:   Temp:  [98.4 °F (36.9 °C)-101.5 °F (38.6 °C)] 101.1 °F (38.4 °C)  Heart Rate:  [] 100  Resp:  [16-20] 20  BP: (115-143)/(66-94) 124/85      Exam:  /85 (BP Location: Left arm, Patient Position: Lying)   Pulse 100   Temp (!) 101.1 °F (38.4 °C) (Oral)   Resp 20   Ht 170.2 cm (67\")   Wt 102 kg (225 lb)   SpO2 97%   BMI 35.24 kg/m²     General Appearance:    Alert, cooperative, no distress, appears stated age   Head:    Normocephalic, without obvious abnormality, atraumatic   Eyes:    PERRL, conjunctivae/corneas clear, EOM's intact, both eyes   Ears:    Normal external ear canals, both ears   Nose:   Nares normal, septum midline, mucosa normal, no drainage    or sinus tenderness   Throat:   Lips, mucosa, and tongue normal   Neck:   Supple, symmetrical, " trachea midline, no adenopathy;     thyroid:  no enlargement/tenderness/nodules; no carotid    bruit or JVD   Back:     Symmetric, no curvature, ROM normal, no CVA tenderness   Lungs:     Decreased breath sounds bilaterally, respirations unlabored   Chest Wall:    No tenderness or deformity    Heart:    Regular rate and rhythm, S1 and S2 normal, no murmur, rub   or gallop   Abdomen:     Soft, nontender, bowel sounds active all four quadrants,     no masses, no hepatomegaly, no splenomegaly   Extremities:   Extremities normal, atraumatic, no cyanosis or edema   Pulses:   2+ and symmetric all extremities   Skin:   Skin color, texture, turgor normal, no rashes or lesions   Lymph nodes:   Cervical, supraclavicular, and axillary nodes normal   Neurologic:   CNII-XII intact, normal strength, sensation intact throughout      .    Data Review:  Labs in chart were reviewed.  WBC   Date Value Ref Range Status   08/08/2021 3.49 3.40 - 10.80 10*3/mm3 Final     Hemoglobin   Date Value Ref Range Status   08/08/2021 11.6 (L) 12.0 - 15.9 g/dL Final     Hematocrit   Date Value Ref Range Status   08/08/2021 35.9 34.0 - 46.6 % Final     Platelets   Date Value Ref Range Status   08/08/2021 220 140 - 450 10*3/mm3 Final     Sodium   Date Value Ref Range Status   08/08/2021 133 (L) 136 - 145 mmol/L Final     Potassium   Date Value Ref Range Status   08/08/2021 3.0 (L) 3.5 - 5.2 mmol/L Final     Chloride   Date Value Ref Range Status   08/08/2021 97 (L) 98 - 107 mmol/L Final     CO2   Date Value Ref Range Status   08/08/2021 22.6 22.0 - 29.0 mmol/L Final     BUN   Date Value Ref Range Status   08/08/2021 4 (L) 6 - 20 mg/dL Final     Creatinine   Date Value Ref Range Status   08/08/2021 0.59 0.57 - 1.00 mg/dL Final     Glucose   Date Value Ref Range Status   08/08/2021 87 65 - 99 mg/dL Final     Calcium   Date Value Ref Range Status   08/08/2021 8.8 8.6 - 10.5 mg/dL Final     Magnesium   Date Value Ref Range Status   08/08/2021 2.2 1.6 -  2.6 mg/dL Final     AST (SGOT)   Date Value Ref Range Status   08/08/2021 33 (H) 1 - 32 U/L Final     ALT (SGPT)   Date Value Ref Range Status   08/08/2021 21 1 - 33 U/L Final     Alkaline Phosphatase   Date Value Ref Range Status   08/08/2021 68 39 - 117 U/L Final     INR   Date Value Ref Range Status   08/08/2021 1.17 (H) 0.90 - 1.10 Final                Imaging Results (All)     Procedure Component Value Units Date/Time    CT Angiogram Chest [[id number]] Collected: 08/08/21 1615     Updated: 08/08/21 1624    Narrative:      CT ANGIOGRAPHY OF THE CHEST WITH INTRAVENOUS CONTRAST AND 3D  RECONSTRUCTIONS     HISTORY: COVID-19 pneumonia. Shortness of breath. Early pregnancy.     FINDINGS:  The CT scan was performed as an emergency procedure with CT  angiography protocol using intravenous contrast and 3D reconstructions.  The following findings are present:  1. Because of the timing of contrast, there is only mild to moderate  opacification of the pulmonary arteries. No definite pulmonary embolus  is seen on this limited exam. These findings have been discussed with  Dr. Salinas in the emergency room. The patient is apparently  approximately 9 weeks pregnant and will have additional evaluation with  venous Doppler but no additional contrast or CT scanning is being  performed.  2. There is prominent scattered pneumonia in both lungs, particularly in  the left upper lobe and consistent with COVID-19 pneumonia. There is no  cavitation. There are no pleural effusions.  3. There is no mediastinal, hilar or axillary adenopathy. There is no  pericardial effusion. The CT images through the upper liver, spleen and  both adrenal glands are unremarkable.        Radiation dose reduction techniques were utilized, including automated  exposure control and exposure modulation based on body size.     This report was finalized on 8/8/2021 4:21 PM by Dr. Genaro Christopher M.D.       XR Chest 1 View [[id number]] Collected: 08/08/21 1246      Updated: 08/08/21 1254    Narrative:      ONE VIEW PORTABLE CHEST     HISTORY: COVID-19 pneumonia. Shortness of breath.     FINDINGS: The lungs are moderately expanded with scattered areas of  pneumonia bilaterally, left greater than right and consistent with  COVID-19 pneumonia. The heart size is normal.     This report was finalized on 8/8/2021 12:51 PM by Dr. Genaro Christopher M.D.           Patient Active Problem List   Diagnosis Code   • Anemia D64.9   • Pneumonia due to COVID-19 virus U07.1, J12.82       Assessment:    Pneumonia due to COVID-19 virus  pregnancy  Obesity  Hyponatremia  Hypokalemia  anemia    Plan:  Monitor on telemetry  Replace potassium  Ask ob to see her  No steroids since she is not hypoxic  Id to see as well  D/w patient and ED Provider  Madison Wynne MD  8/8/2021  19:08 EDT

## 2021-08-09 PROBLEM — R19.7 DIARRHEA: Status: ACTIVE | Noted: 2021-08-09

## 2021-08-09 PROBLEM — E87.6 HYPOKALEMIA: Status: ACTIVE | Noted: 2021-08-09

## 2021-08-09 PROBLEM — J96.01 ACUTE HYPOXEMIC RESPIRATORY FAILURE DUE TO COVID-19 (HCC): Status: ACTIVE | Noted: 2021-08-08

## 2021-08-09 LAB
ALBUMIN SERPL-MCNC: 3.4 G/DL (ref 3.5–5.2)
ALP SERPL-CCNC: 63 U/L (ref 39–117)
ALT SERPL W P-5'-P-CCNC: 28 U/L (ref 1–33)
ANION GAP SERPL CALCULATED.3IONS-SCNC: 14.2 MMOL/L (ref 5–15)
AST SERPL-CCNC: 39 U/L (ref 1–32)
BILIRUB CONJ SERPL-MCNC: 0.2 MG/DL (ref 0–0.3)
BILIRUB INDIRECT SERPL-MCNC: 0.2 MG/DL
BILIRUB SERPL-MCNC: 0.4 MG/DL (ref 0–1.2)
BUN SERPL-MCNC: 3 MG/DL (ref 6–20)
BUN/CREAT SERPL: 6.5 (ref 7–25)
CALCIUM SPEC-SCNC: 8.2 MG/DL (ref 8.6–10.5)
CHLORIDE SERPL-SCNC: 102 MMOL/L (ref 98–107)
CO2 SERPL-SCNC: 18.8 MMOL/L (ref 22–29)
CREAT SERPL-MCNC: 0.46 MG/DL (ref 0.57–1)
DEPRECATED RDW RBC AUTO: 44.1 FL (ref 37–54)
ERYTHROCYTE [DISTWIDTH] IN BLOOD BY AUTOMATED COUNT: 14.6 % (ref 12.3–15.4)
GFR SERPL CREATININE-BSD FRML MDRD: >150 ML/MIN/1.73
GLUCOSE SERPL-MCNC: 80 MG/DL (ref 65–99)
HCT VFR BLD AUTO: 32.2 % (ref 34–46.6)
HGB BLD-MCNC: 10.3 G/DL (ref 12–15.9)
MCH RBC QN AUTO: 26.5 PG (ref 26.6–33)
MCHC RBC AUTO-ENTMCNC: 32 G/DL (ref 31.5–35.7)
MCV RBC AUTO: 83 FL (ref 79–97)
PLATELET # BLD AUTO: 204 10*3/MM3 (ref 140–450)
PMV BLD AUTO: 10.8 FL (ref 6–12)
POTASSIUM SERPL-SCNC: 3.1 MMOL/L (ref 3.5–5.2)
PROT SERPL-MCNC: 6.3 G/DL (ref 6–8.5)
RBC # BLD AUTO: 3.88 10*6/MM3 (ref 3.77–5.28)
SODIUM SERPL-SCNC: 135 MMOL/L (ref 136–145)
WBC # BLD AUTO: 3.31 10*3/MM3 (ref 3.4–10.8)

## 2021-08-09 PROCEDURE — 25010000002 ENOXAPARIN PER 10 MG: Performed by: HOSPITALIST

## 2021-08-09 PROCEDURE — 63710000001 DEXAMETHASONE PER 0.25 MG: Performed by: HOSPITALIST

## 2021-08-09 PROCEDURE — 99255 IP/OBS CONSLTJ NEW/EST HI 80: CPT | Performed by: STUDENT IN AN ORGANIZED HEALTH CARE EDUCATION/TRAINING PROGRAM

## 2021-08-09 PROCEDURE — XW033E5 INTRODUCTION OF REMDESIVIR ANTI-INFECTIVE INTO PERIPHERAL VEIN, PERCUTANEOUS APPROACH, NEW TECHNOLOGY GROUP 5: ICD-10-PCS | Performed by: INTERNAL MEDICINE

## 2021-08-09 PROCEDURE — 80076 HEPATIC FUNCTION PANEL: CPT | Performed by: STUDENT IN AN ORGANIZED HEALTH CARE EDUCATION/TRAINING PROGRAM

## 2021-08-09 PROCEDURE — 85027 COMPLETE CBC AUTOMATED: CPT | Performed by: INTERNAL MEDICINE

## 2021-08-09 PROCEDURE — 97110 THERAPEUTIC EXERCISES: CPT

## 2021-08-09 PROCEDURE — 97161 PT EVAL LOW COMPLEX 20 MIN: CPT

## 2021-08-09 PROCEDURE — 80048 BASIC METABOLIC PNL TOTAL CA: CPT | Performed by: INTERNAL MEDICINE

## 2021-08-09 RX ORDER — POTASSIUM CHLORIDE 1.5 G/1.77G
40 POWDER, FOR SOLUTION ORAL AS NEEDED
Status: DISCONTINUED | OUTPATIENT
Start: 2021-08-09 | End: 2021-08-13 | Stop reason: HOSPADM

## 2021-08-09 RX ORDER — POTASSIUM CHLORIDE 7.45 MG/ML
10 INJECTION INTRAVENOUS
Status: DISCONTINUED | OUTPATIENT
Start: 2021-08-09 | End: 2021-08-13 | Stop reason: HOSPADM

## 2021-08-09 RX ORDER — POTASSIUM CHLORIDE 750 MG/1
40 TABLET, FILM COATED, EXTENDED RELEASE ORAL AS NEEDED
Status: DISCONTINUED | OUTPATIENT
Start: 2021-08-09 | End: 2021-08-13 | Stop reason: HOSPADM

## 2021-08-09 RX ORDER — ACETAMINOPHEN 325 MG/1
650 TABLET ORAL EVERY 4 HOURS PRN
Status: DISCONTINUED | OUTPATIENT
Start: 2021-08-09 | End: 2021-08-13 | Stop reason: HOSPADM

## 2021-08-09 RX ADMIN — DEXAMETHASONE 6 MG: 4 TABLET ORAL at 08:16

## 2021-08-09 RX ADMIN — Medication 1 TABLET: at 08:16

## 2021-08-09 RX ADMIN — FERROUS SULFATE TAB 325 MG (65 MG ELEMENTAL FE) 325 MG: 325 (65 FE) TAB at 08:16

## 2021-08-09 RX ADMIN — ACETAMINOPHEN 650 MG: 325 TABLET, FILM COATED ORAL at 04:01

## 2021-08-09 RX ADMIN — POTASSIUM CHLORIDE 40 MEQ: 750 TABLET, EXTENDED RELEASE ORAL at 18:56

## 2021-08-09 RX ADMIN — ENOXAPARIN SODIUM 40 MG: 40 INJECTION SUBCUTANEOUS at 08:15

## 2021-08-09 RX ADMIN — REMDESIVIR 200 MG: 100 INJECTION, POWDER, LYOPHILIZED, FOR SOLUTION INTRAVENOUS at 13:04

## 2021-08-09 RX ADMIN — POTASSIUM CHLORIDE 40 MEQ: 750 TABLET, EXTENDED RELEASE ORAL at 13:18

## 2021-08-09 NOTE — CASE MANAGEMENT/SOCIAL WORK
Discharge Planning Assessment  [facility]     Patient Name: Regine Martins  MRN: [id number]  Today's Date: 8/9/2021    Admit Date: 8/8/2021    Discharge Needs Assessment     Row Name 08/09/21 1143       Living Environment    Lives With  child(jeannette), dependent    Name(s) of Who Lives With Patient  Young daughter    Current Living Arrangements  home/apartment/condo    Primary Care Provided by  self    Provides Primary Care For  child(jeannette)    Family Caregiver if Needed  parent(s)    Family Caregiver Names  Mother Ranjana [phone]    Quality of Family Relationships  helpful    Able to Return to Prior Arrangements  yes       Resource/Environmental Concerns    Resource/Environmental Concerns  none       Transition Planning    Patient/Family Anticipates Transition to  home with family    Patient/Family Anticipated Services at Transition  none    Transportation Anticipated  family or friend will provide       Discharge Needs Assessment    Readmission Within the Last 30 Days  no previous admission in last 30 days    Equipment Currently Used at Home  none    Concerns to be Addressed  denies needs/concerns at this time    Anticipated Changes Related to Illness  none    Equipment Needed After Discharge  none        Discharge Plan     Row Name 08/09/21 2781       Plan    Plan  Return home with family    Patient/Family in Agreement with Plan  yes    Plan Comments  Spoke with patient by telephone due to isolation precautions.  Patient lives with her dependent daughter, is IADL, uses no DME, has never used HH.  PCP is Viky EUCEDA and pharmacy is [address] [facility].  Patient plans to return home at [place].   Emergency contact is her mother Ranjana Martins [phone].  CCP will follow as needed.  BHumeniuk RN        Continued Care and Services - Admitted Since 8/8/2021    Coordination has not been started for this encounter.         Demographic Summary     Row Name 08/09/21 1143       General  Information    Admission Type  inpatient    Arrived From  home    Referral Source  admission list    Reason for Consult  discharge planning    Preferred Language  English     Used During This Interaction  no        Functional Status     Row Name 08/09/21 1143       Functional Status    Usual Activity Tolerance  good    Current Activity Tolerance  good       Functional Status, IADL    Medications  independent    Meal Preparation  independent    Housekeeping  independent    Laundry  independent    Shopping  independent       Mental Status    General Appearance WDL  -- Unable to assess       Mental Status Summary    Recent Changes in Mental Status/Cognitive Functioning  no changes                Becky S. Humeniuk, RN

## 2021-08-09 NOTE — PAYOR COMM NOTE
"Mai Martins (25 y.o. Female)     PLEASE SEE [facility] CLINICAL  REVIEW    REF#[id number]    PLEASE CALL   OR  [phone] WITH INPT AUTH AND DAYS.     THANK YOU    SONA VERMA LPN CCP    Date of Birth Social Security Number Address Home Phone MRN    1995  [address]   [facility] [address]  [id number]    Muslim Marital Status          None Single       Admission Date Admission Type Admitting Provider Attending Provider Department, Room/Bed    21 Emergency Madison Wynne MD Nguyen, Minh Loc, MD [address], S616/    Discharge Date Discharge Disposition Discharge Destination                       Attending Provider: Andrea Smith MD    Allergies: No Known Allergies    Isolation: Enh Drop/Con   Infection: COVID (confirmed) (21)   Code Status: CPR    Ht: 170.2 cm (67\")   Wt: 104 kg (228 lb 3.2 oz)    Admission Cmt: None   Principal Problem: Acute hypoxemic respiratory failure due to COVID-19 (CMS/Formerly [facility]) [U07.1,J96.01]                 Active Insurance as of 2021     Primary Coverage     Payor Plan Insurance Group Employer/Plan Group    [employer] BLUE [employer]OSS [employer] BLUE CROSS BLUE [employer]IELD PPO [id number]     Payor Plan Address Payor Plan Phone Number Payor Plan Fax Number Effective Dates    [address] [phone]  2021 - None Entered    [place] [address]       Subscriber Name Subscriber Birth Date Member ID       MAI MARTINS 1995 [id number]                 Emergency Contacts      (Rel.) Home Phone Work Phone Mobile Phone    Ranjana Martins (Mother) [phone] -- --               History & Physical      Madison Wynne MD at [phone]          HISTORY AND PHYSICAL   [facility]        Patient Identification:  Name: Mai Martins  Age: 25 y.o.  Sex: female  :  1995  MRN: [id number]                     Primary Care Physician: Ynes Andres MD    Chief " Complaint:  25 year old female who presented to the emergency room with cough, shortness of breath and congestion for the last few days; she was also febrile to 101; she tested positive for covid last week and her sister is positive as well; she has had rhinorrhea; and some diarrhea; appetite has not been good;     History of Present Illness:   As above    Past Medical History:  Past Medical History:   Diagnosis Date   • Abnormal Pap smear of cervix    • Anemia    • Migraine      Past Surgical History:  Past Surgical History:   Procedure Laterality Date   • FOOT SURGERY      Per pt, to remove bones; had 2 procedures      Home Meds:  Medications Prior to Admission   Medication Sig Dispense Refill Last Dose   • ferrous sulfate 325 (65 FE) MG tablet Take 325 mg by mouth Daily With Breakfast.   Past Week at Unknown time   • ibuprofen (ADVIL,MOTRIN) 600 MG tablet Take 1 tablet by mouth Every 8 (Eight) Hours As Needed for Mild Pain . 30 tablet 3 Past Week at Unknown time   • Prenatal Vit-Fe Fumarate-FA (PRENATAL, CLASSIC, VITAMIN) 28-0.8 MG tablet tablet Take 1 tablet by mouth Daily.   Past Week at Unknown time   • valACYclovir (VALTREX) 500 MG tablet Take 500 mg by mouth 2 (Two) Times a Day.   Past Week at Unknown time       Allergies:  No Known Allergies  Immunizations:  Immunization History   Administered Date(s) Administered   • DTaP 09/10/2018   • Rho (D) Immune Globulin 09/10/2018, 12/03/2018     Social History:   Social History     Social History Narrative   • Not on file     Social History     Socioeconomic History   • Marital status: Single     Spouse name: Not on file   • Number of children: Not on file   • Years of education: Not on file   • Highest education level: Not on file   Tobacco Use   • Smoking status: Never Smoker   • Smokeless tobacco: Never Used   Substance and Sexual Activity   • Alcohol use: No   • Drug use: No   • Sexual activity: Defer       Family History:  History reviewed. No pertinent family  "history.     Review of Systems  See history of present illness and past medical history.  Patient denies headache, dizziness, syncope, falls, trauma, change in vision, change in hearing, change in taste, changes in weight, changes in appetite, focal weakness, numbness, or paresthesia.  Patient denies chest pain,  orthopnea, PND, cough, sinus pressure, rhinorrhea, epistaxis, hemoptysis, nausea, vomiting,hematemesis, diarrhea, constipation or hematchezia.  Denies cold or heat intolerance, polydipsia, polyuria, polyphagia. Denies hematuria, pyuria, dysuria, hesitancy, frequency or urgency. Denies consumption of raw and under cooked meats foods or change in water source.  Denies fever, chills, sweats, night sweats.  Denies missing any routine medications. Remainder of ROS is negative.    Objective:  T Max 24 hrs: Temp (24hrs), Av.1 °F (37.8 °C), Min:98.4 °F (36.9 °C), Max:101.5 °F (38.6 °C)    Vitals Ranges:   Temp:  [98.4 °F (36.9 °C)-101.5 °F (38.6 °C)] 101.1 °F (38.4 °C)  Heart Rate:  [] 100  Resp:  [16-20] 20  BP: (115-143)/(66-94) 124/85      Exam:  /85 (BP Location: Left arm, Patient Position: Lying)   Pulse 100   Temp (!) 101.1 °F (38.4 °C) (Oral)   Resp 20   Ht 170.2 cm (67\")   Wt 102 kg (225 lb)   SpO2 97%   BMI 35.24 kg/m²     General Appearance:    Alert, cooperative, no distress, appears stated age   Head:    Normocephalic, without obvious abnormality, atraumatic   Eyes:    PERRL, conjunctivae/corneas clear, EOM's intact, both eyes   Ears:    Normal external ear canals, both ears   Nose:   Nares normal, septum midline, mucosa normal, no drainage    or sinus tenderness   Throat:   Lips, mucosa, and tongue normal   Neck:   Supple, symmetrical, trachea midline, no adenopathy;     thyroid:  no enlargement/tenderness/nodules; no carotid    bruit or JVD   Back:     Symmetric, no curvature, ROM normal, no CVA tenderness   Lungs:     Decreased breath sounds bilaterally, respirations unlabored "   Chest Wall:    No tenderness or deformity    Heart:    Regular rate and rhythm, S1 and S2 normal, no murmur, rub   or gallop   Abdomen:     Soft, nontender, bowel sounds active all four quadrants,     no masses, no hepatomegaly, no splenomegaly   Extremities:   Extremities normal, atraumatic, no cyanosis or edema   Pulses:   2+ and symmetric all extremities   Skin:   Skin color, texture, turgor normal, no rashes or lesions   Lymph nodes:   Cervical, supraclavicular, and axillary nodes normal   Neurologic:   CNII-XII intact, normal strength, sensation intact throughout      .         Patient Active Problem List   Diagnosis Code   • Anemia D64.9   • Pneumonia due to COVID-19 virus U07.1, J12.82       Assessment:    Pneumonia due to COVID-19 virus  pregnancy  Obesity  Hyponatremia  Hypokalemia  anemia    Plan:  Monitor on telemetry  Replace potassium  Ask ob to see her  No steroids since she is not hypoxic  Id to see as well  D/w patient and ED Provider  Madison Wynne MD  8/8/2021  19:08 EDT      Electronically signed by Madison Wynne MD at 08/08/21 1911          Emergency Department Notes      Ilda Eric RN at 08/08/21 1105        Pt reports she was dx with covid on Wednesday and has had an increase in SOA. Pt reports she is 9wks pregnant.      Pt was wearing a mask during assessment.  This RN wore appropriate PPE      Electronically signed by Ilda Eric RN at 08/08/21 1106     Maximilian Salinas MD at 08/08/21 1125      Procedure Orders    1. Critical Care [[id number]] ordered by Maximilian Salinas MD                EMERGENCY DEPARTMENT ENCOUNTER    Room Number:  S616/1  Date of encounter:  8/8/2021  PCP: Ynes Andres MD  Historian: Patient      HPI:  Chief Complaint: I have COVID and I am feeling worse  A complete HPI/ROS/PMH/PSH/SH/FH are unobtainable due to: Not applicable  Context: Regine Martins is a 25 y.o. female who presents to the ED c/o patient symptoms started  approximately 1 week ago with headache, mild sore throat, some mild nasal drainage. Symptoms then progressed to a cough. Cough is mainly dry and nonproductive. Body aches and fatigue has continued and progressed. She has developed shortness of breath the past several days. Shortness of breath is worse with exertion. Currently sitting in the bed she denies being short of breath. She currently is pregnant at 9 weeks. She is . Her first pregnancy was normal spontaneous vaginal delivery at term. She denies any history of lung problems or heart problems. Denies any complications with this pregnancy. Denies any pelvic pain or vaginal bleeding. She has had a little amount of diarrhea. She has no appetite and has been drinking and eating much less. Patient tested positive for Covid on 2021. She has not been vaccinated.        Previous Episodes: No  Current Symptoms: See above    MEDICAL HISTORY REVIEWED  Currently pregnant  at 9 weeks. She does have a history of migraines in the past as well.      PAST MEDICAL HISTORY  Active Ambulatory Problems     Diagnosis Date Noted   • Anemia 2018     Resolved Ambulatory Problems     Diagnosis Date Noted   • Pregnancy 2018     Past Medical History:   Diagnosis Date   • Abnormal Pap smear of cervix    • Migraine          PAST SURGICAL HISTORY  Past Surgical History:   Procedure Laterality Date   • FOOT SURGERY      Per pt, to remove bones; had 2 procedures         FAMILY HISTORY  History reviewed. No pertinent family history.      SOCIAL HISTORY  Social History     Socioeconomic History   • Marital status: Single     Spouse name: Not on file   • Number of children: Not on file   • Years of education: Not on file   • Highest education level: Not on file   Tobacco Use   • Smoking status: Never Smoker   • Smokeless tobacco: Never Used   Substance and Sexual Activity   • Alcohol use: No   • Drug use: No   • Sexual activity: Defer         ALLERGIES  Patient  has no known allergies.        REVIEW OF SYSTEMS  Review of Systems     All systems reviewed and negative except for those discussed in HPI.       PHYSICAL EXAM    I have reviewed the triage vital signs and nursing notes.    ED Triage Vitals [08/08/21 1106]   Temp Heart Rate Resp BP SpO2   98.4 °F (36.9 °C) 111 16 -- 95 %      Temp src Heart Rate Source Patient Position BP Location FiO2 (%)   Tympanic Monitor -- -- --       GENERAL: Young female appears weak and tired, but no acute distress.Vital signs on my initial evaluation O2 sat sitting still is 9697% on room air. When she ambulates in the room her O2 sat goes down to 94 and 93%. She does have a mild cough on exam that is nonproductive. She has mild tachycardia and her blood pressure is normal.  HENT: nares patent  Head/neck/ face are symmetric without gross deformity, signs of trauma, or swelling  EYES: no scleral icterus, no conjunctival pallor.  NECK: Supple, no meningismus  CV: regular rhythm, regular rate with intact distal pulses. No murmur or rub  RESPIRATORY: Patient has some mild tachypnea. Respiratory rate is in the low 20s. She has a mild cough on exam. She has crackles and rhonchi posteriorly in the base of her lungs bilaterally.  ABDOMEN: soft and nontender. Obese  MUSCULOSKELETAL: no deformity. No edema. Intact distal pulses to upper and lower extremities that are equal strong and symmetric.  NEURO: alert and appropriate, moves all extremities, follows commands. No focal motor or sensory changes.  SKIN: warm, dry    Vital signs and nursing notes reviewed.        RADIOLOGY  XR Chest 1 View    Result Date: 8/8/2021  ONE VIEW PORTABLE CHEST  HISTORY: COVID-19 pneumonia. Shortness of breath.  FINDINGS: The lungs are moderately expanded with scattered areas of pneumonia bilaterally, left greater than right and consistent with COVID-19 pneumonia. The heart size is normal.  This report was finalized on 8/8/2021 12:51 PM by Dr. Genaro Christopher M.D.      CT  Angiogram Chest    Result Date: 8/8/2021  CT ANGIOGRAPHY OF THE CHEST WITH INTRAVENOUS CONTRAST AND 3D RECONSTRUCTIONS  HISTORY: COVID-19 pneumonia. Shortness of breath. Early pregnancy.  FINDINGS:  The CT scan was performed as an emergency procedure with CT angiography protocol using intravenous contrast and 3D reconstructions. The following findings are present: 1. Because of the timing of contrast, there is only mild to moderate opacification of the pulmonary arteries. No definite pulmonary embolus is seen on this limited exam. These findings have been discussed with Dr. Salinas in the emergency room. The patient is apparently approximately 9 weeks pregnant and will have additional evaluation with venous Doppler but no additional contrast or CT scanning is being performed. 2. There is prominent scattered pneumonia in both lungs, particularly in the left upper lobe and consistent with COVID-19 pneumonia. There is no cavitation. There are no pleural effusions. 3. There is no mediastinal, hilar or axillary adenopathy. There is no pericardial effusion. The CT images through the upper liver, spleen and both adrenal glands are unremarkable.   Radiation dose reduction techniques were utilized, including automated exposure control and exposure modulation based on body size.  This report was finalized on 8/8/2021 4:21 PM by Dr. Genaro Christopher M.D.        I ordered the above noted radiological studies. Reviewed by me and discussed with radiologist.  See dictation for official radiology interpretation.      PROCEDURES    Critical Care  Performed by: Maximilian Salinas MD  Authorized by: Maximilian Salinas MD     Critical care provider statement:     Critical care time (minutes): 45.    Critical care time was exclusive of:  Separately billable procedures and treating other patients    Critical care was necessary to treat or prevent imminent or life-threatening deterioration of the following conditions:  Respiratory failure and  sepsis    Critical care was time spent personally by me on the following activities:  Blood draw for specimens, development of treatment plan with patient or surrogate, discussions with consultants, evaluation of patient's response to treatment, examination of patient, ordering and performing treatments and interventions, ordering and review of laboratory studies, ordering and review of radiographic studies, pulse oximetry, re-evaluation of patient's condition and review of old charts    I assumed direction of critical care for this patient from another provider in my specialty: no            MEDICATIONS GIVEN IN ER    Medications   sodium chloride 0.9 % flush 10 mL (has no administration in time range)   acetaminophen (TYLENOL) tablet 650 mg (has no administration in time range)   ondansetron (ZOFRAN) tablet 4 mg (has no administration in time range)     Or   ondansetron (ZOFRAN) injection 4 mg (has no administration in time range)   ferrous sulfate tablet 325 mg (has no administration in time range)   ibuprofen (ADVIL,MOTRIN) tablet 600 mg (has no administration in time range)   prenatal vitamin tablet 1 tablet (has no administration in time range)   sodium chloride 0.9 % bolus 1,000 mL (0 mL Intravenous Stopped 8/8/21 1410)   cefTRIAXone (ROCEPHIN) IVPB 1 g (0 g Intravenous Stopped 8/8/21 1410)   potassium chloride (K-DUR,KLOR-CON) ER tablet 40 mEq (40 mEq Oral Given 8/8/21 1410)   acetaminophen (TYLENOL) tablet 1,000 mg (1,000 mg Oral Given 8/8/21 1412)   sodium chloride 0.9 % bolus 1,000 mL (1,000 mL Intravenous New Bag 8/8/21 1537)   iopamidol (ISOVUE-370) 76 % injection 100 mL (93 mL Intravenous Given 8/8/21 1441)         PROGRESS, DATA ANALYSIS, CONSULTS, AND MEDICAL DECISION MAKING    This patient has Covid and Covid pneumonia. I have looked at the x-ray. Informed the patient that she will need to be admitted to the hospital and the test that we will order. All questions answered at this time. We will also  can start her on some IV fluids. We will consult woman's first who is her  gynecologist. All questions answered at this time.    We are currently under a pandemic from the COVID19 infection.  The patient presented to the emergency department by ambulance or personal vehicle. I followed the current protocols required by Infection Control at [facility] in my evaluation and treatment of the patient. The patient was wearing a face mask during my evaluation and throughout my encounter. During my whole encounter with this patient I used appropriate personal protective equipment.  This equipment consisted of eye protection, facemask, gown, and gloves.  I applied this equipment before entering the room.      All labs have been independently reviewed by me.  All radiology studies have been reviewed by me and discussed with radiologist dictating the report.   EKG's independently viewed and interpreted by me.  Discussion below represents my analysis of pertinent findings related to patient's condition, differential diagnosis, treatment plan and final disposition.      ED Course as of Aug 08 1936   Sun Aug 08, 2021   1241 I spoke with the nurse practitioner this on for woman's first.  She is on for Dr. Deborah Baker.  Informed her this patient will need to be admitted that she is pregnant in 9 weeks and has Covid pneumonia.  They would want the hospitalist to admit and they will consult.    [MM]   1241 D-Dimer, Quant(!): 0.91 [MM]   1241 EKG readingEKG was done at 1212It is a rate of 99 it is normal sinus rhythmIt is narrow complex with normal axisThere is diffuse nonspecific T wave changes    [MM]   1242 In several leadsPatient's QT is normal.  I do not have an old EKG to compare with.    [MM]   1244 I reviewed the chest x-ray and reviewed the radiologist report.  She has bilateral pneumonia left greater than the right.  Please see complete dictated report from the radiologist.    [MM]   1409 I discussed  the case with the infectious disease doctor Dr. Harvey.  He will consult.  No emergent treatment needed at this time.  Patient is not hypoxic.  He agrees with the plan agrees with CT angiogram.    [MM]   1459 I discussed the results of the CT angiogram of the chest with Dr. Christopher.  It was not a good study.  We know that she does not have any major branch pulmonary embolism.  No saddle pulmonary embolism.  The left to right heart ratio appears normal.  We know she has diffuse extensive pulmonary infiltrates consistent with Covid pneumonia.  Not offer much concerning the secondary branches of the pulmonary arteries.  Please see his complete dictated report.    [MM]   1459 This patient has reasons to explain her shortness of breath.  We do not see any major PE.  She is 9 weeks pregnant and I do not want to expose her to another dose of contrast and thus I feel that we can again anything.  I consulted with the infectious disease doctor Dr. Fairchild again.  The majority of people that do have pulmonary embolisms with Covid usually have DVTs.  We will get a Doppler her legs to make sure she has no DVT.  She does not complain of any extremity swelling or focal extremity pain.  She has some generalized fatigue and weakness and mild body aches.    [MM]   1513 I did speak with Dr. Vaughn who is on for A.  Informed her of the patient's presenting symptoms and results of test.  Informed her of the results of the CT scan.  We will get a Doppler her legs and if that Doppler is negative for DVT we will not proceed any further at this time.  She agrees to admit.  All questions are answered.    [MM]   1801 Patient is remained hemodynamically stable in the emergency department.  Patient's oxygen saturation on room air is been 95 to 97%.  Blood pressures been normal.    [MM]   1810 I discussed the case with the vascular tech Ronak.  Venous Doppler of the patient's bilateral lower extremities is negative for DVT.    [MM]      ED  Course User Index  [MM] Maximilian Salinas MD       AS OF 19:36 EDT VITALS:    BP - 124/85  HR - 100  TEMP - (!) 101.1 °F (38.4 °C) (Oral)  02 SATS - 97%        DIAGNOSIS  Final diagnoses:   Pneumonia due to COVID-19 virus   9 weeks gestation of pregnancy         DISPOSITION  I have reviewed the test results with my patient and explained the current treatment plan.  I answered all of the patient's questions.  The patient will be admitted to monitor bed at this time.  The patient is not hypotensive and is tolerating their current disease condition well enough for a monitored bed at this time.  The patient's current condition does not require intensive care treatment at this time.             Maximilian Salinas MD  08/08/21 1936      Electronically signed by Maximilian Salinas MD at 08/08/21 1936     Raj Lion RN at 08/08/21 1538        Patient wearing mask, nurse wearing mask, n95, protective eyewear, gown and gloves for care.  Hand hygiene performed prior to and post care.      Raj Lion RN  08/08/21 1538      Electronically signed by Raj Lion RN at 08/08/21 1538     Savi Ferreira RN at 08/08/21 1653        Vascular on way down at this time      Savi Ferreira RN  08/08/21 1654      Electronically signed by Savi Ferreira RN at 08/08/21 1654       {Outbreak/Travel/Exposure Documentation......;  Question Available Choices Patient Response   COVID-19 Outbreak Screen:  Do you currently have a new onset of the following symptoms?        Fever/Chills, Cough, Shortness of air, Loss of taste or smell, No, Unknown  (!) Cough;Shortness of Air;Fever/Chills (08/08/21 1108)   COVID-19 Outbreak Screen: In the last 14 days, have you had contact with anyone who is ill, has show any of the symptoms listed above and/or has been diagnosis with the 2019 Novel Coronavirus? This includes any immediate household members but excludes any patients with whom you have been in contact within your normal work  duties wearing proper PPE, if you are a healthcare worker.  Yes, No, Unknown              (!) Yes (08/08/21 1108)   COVID-19 Outbreak Screen: Who was notified? Free text (not recorded)   Ebola Screening Outbreak Screen: Have you traveled to the [place] the [place] or [place] within the past 21 days?  Yes, No, Unknown (not recorded)   Ebola Screening Outbreak Screen: Do you have ANY of the following symptoms: Fever/Chills, Vomiting, Diarrhea, Fatigue, Headache, Muscle pain, Unexplained bleeding, Abdominal (stomach) pain, No, Unknown (not recorded)   Ebola Screening Outbreak Screen: Name of Person notified Free text (not recorded)   Travel Screen: Have you traveled in the last month? If so, to what country have you traveled? If [place] what state? Yes, No, Unknown  List of all countries  List of all States No (08/08/21 1107)  (not recorded)  (not recorded)   Infection Risk: Do you currently have the following symptoms?  (If cough is selected, the Tuberculosis Screen is performed.) Cough, Fever, Rash, No (!) Cough;Fever (08/08/21 1107)   Tuberculosis Screen: Do you have any of the following Tuberculosis Risks?  · Have you lived or spent time with anyone who had or may have TB?  · Have you lived in or visited any of the following areas for more than one month: [place], [place], [place], [place] or [place], the [place] or [place]?  · Do you have HIV/AIDS?  · Have you lived in or worked in a nursing home, homeless shelter, correctional facility, or substance abuse treatment facility?   · No    If Yes do you have any of the following symptoms? Yes responses display to the right    If Yes, symptoms listed are:  Cough greater than or equal to 3 weeks, Loss of appetite, Unexplained weight loss, Night sweats, Bloody sputum or hemoptysis, Hoarseness, Fever, Fatigue, Chest pain, No No (08/08/21 1107)  (not recorded)   Exposure Screen: Have you been exposed to any of these contagious diseases in the last month?  Measles, Chickenpox, Meningitis, Pertussis, Whooping Cough, No No (21 1107)     Oxygen Therapy (since admission)     Date/Time   SpO2   Device (Oxygen Therapy)   Flow (L/min)   Oxygen Concentration (%)   ETCO2 (mmHg)    21 0818   --   nasal cannula   2   --   --    21 0700   92   --   --   --   --    21 0400   92   nasal cannula   2   --   --    21 0010   94   nasal cannula   2   --   --    21 2252   --   nasal cannula   2   --   --    21 2040   --   room air   --   --   --    21 2039   92   --   --   --   --    21 1943   --   room air   --   --   --    21 1902   97   room air   --   --   --    21 1531   96   room air   --   --   --    21 1400   100   --   --   --   --    21 1346   97   --   --   --   --    21 1316   97   --   --   --   --    21 1301   90   --   --   --   --    21 1246   97   --   --   --   --    21 1216   97   --   --   --   --    21 1149   96   room air   --   --   --    21 1106   95   room air   --   --   --            Intake & Output (last 3 days)        07 -  07 07 -  07 0701 -  0700  0701 - 08/10 0700    P.O.   0     IV Piggyback   1050     Total Intake(mL/kg)   1050 (10.1)     Net   +1050             Urine Unmeasured Occurrence   2 x     Stool Unmeasured Occurrence   1 x          Lines, Drains & Airways    Active LDAs     Name:   Placement date:   Placement time:   Site:   Days:    Peripheral IV 21 1200 Left Antecubital   21    1200    Antecubital   1         Inactive LDAs     None                     Physician Progress Notes (last 72 hours) (Notes from 21 1523 through 21 1523)      Andrea Smith MD at 21 1407              Name: Regine Martins ADMIT: 2021   : 1995  PCP: Ynes Andres MD    MRN: [id number] LOS: 1 days   AGE/SEX: 25 y.o. female  ROOM: [facility]     Subjective    Subjective   No shortness of breath at rest. Some with exertion. Saturations drop with exertion. Denies any nausea vomiting. Diarrhea resolved.    Greater than 50% of time spent in counseling and/or coordination of care. Total face/floor time 35 minutes.     Review of Systems   Constitutional: Negative for fever.   Respiratory: Positive for shortness of breath. Negative for cough.    Cardiovascular: Negative for chest pain.   Gastrointestinal: Negative for abdominal pain.     Objective   Objective   Vital Signs  Temp:  [98.8 °F (37.1 °C)-101.8 °F (38.8 °C)] 98.8 °F (37.1 °C)  Heart Rate:  [] 97  Resp:  [18-21] 21  BP: (113-127)/(66-85) 113/77  SpO2:  [92 %-97 %] 92 %  on  Flow (L/min):  [2] 2;   Device (Oxygen Therapy): nasal cannula  Body mass index is 35.74 kg/m².    Physical Exam  Constitutional:       Appearance: Normal appearance.   HENT:      Head: Normocephalic and atraumatic.   Cardiovascular:      Rate and Rhythm: Normal rate and regular rhythm.   Pulmonary:      Effort: Pulmonary effort is normal. No respiratory distress.      Breath sounds: Decreased breath sounds present.   Abdominal:      General: Bowel sounds are normal. There is no distension.      Palpations: Abdomen is soft.      Tenderness: There is no abdominal tenderness. There is no guarding or rebound.   Musculoskeletal:         General: No swelling.      Right lower leg: No edema.      Left lower leg: No edema.   Skin:     General: Skin is warm and dry.   Neurological:      General: No focal deficit present.      Mental Status: She is alert and oriented to person, place, and time.   Psychiatric:         Mood and Affect: Mood normal.         Behavior: Behavior normal.     Results Review  I reviewed the patient's new clinical results.  Results from last 7 days   Lab Units 08/09/21  0834 08/08/21  1201   WBC 10*3/mm3 3.31* 3.49   HEMOGLOBIN g/dL 10.3* 11.6*   PLATELETS 10*3/mm3 204 220     Results from last 7 days   Lab Units  08/09/21  0834 08/08/21  1201   SODIUM mmol/L 135* 133*   POTASSIUM mmol/L 3.1* 3.0*   CHLORIDE mmol/L 102 97*   CO2 mmol/L 18.8* 22.6   BUN mg/dL 3* 4*   CREATININE mg/dL 0.46* 0.59   GLUCOSE mg/dL 80 87     Lab Results   Component Value Date    ANIONGAP 14.2 08/09/2021     Estimated Creatinine Clearance: 230 mL/min (A) (by C-G formula based on SCr of 0.46 mg/dL (L)).    Results from last 7 days   Lab Units 08/09/21  0834 08/08/21  1201   ALBUMIN g/dL 3.40* 4.10   BILIRUBIN mg/dL 0.4 0.6   ALK PHOS U/L 63 68   AST (SGOT) U/L 39* 33*   ALT (SGPT) U/L 28 21     Results from last 7 days   Lab Units 08/09/21  0834 08/08/21  1201   CALCIUM mg/dL 8.2* 8.8   ALBUMIN g/dL 3.40* 4.10   MAGNESIUM mg/dL  --  2.2     Results from last 7 days   Lab Units 08/08/21  1328 08/08/21  1201   PROCALCITONIN ng/mL  --  0.09   LACTATE mmol/L 1.4  --          Scheduled Meds  dexamethasone, 6 mg, Oral, Daily With Breakfast  enoxaparin, 40 mg, Subcutaneous, Q24H  ferrous sulfate, 325 mg, Oral, Daily With Breakfast  prenatal vitamin, 1 tablet, Oral, Daily  [START ON 8/10/2021] remdesivir, 100 mg, Intravenous, Q24H    Continuous Infusions  Pharmacy Consult - Remdesivir,     PRN Meds  •  acetaminophen  •  ibuprofen  •  ondansetron **OR** ondansetron  •  Pharmacy Consult - Remdesivir  •  potassium chloride **OR** potassium chloride **OR** potassium chloride  •  [COMPLETED] Insert peripheral IV **AND** sodium chloride    Pharmacy Consult - Remdesivir,     Diet  Diet Regular     COVID LABS:  Results From Last 14 Days   Lab Units 08/08/21  1328 08/08/21  1201   PROBNP pg/mL  --  8.2   D DIMER QUANT MCGFEU/mL  --  0.91*   LACTATE mmol/L 1.4  --    PROCALCITONIN ng/mL  --  0.09   PROTIME Seconds  --  14.8*   INR   --  1.17*   TROPONIN T ng/mL  --  <0.010       Assessment/Plan     Active Hospital Problems    Diagnosis  POA   • **Acute hypoxemic respiratory failure due to COVID-19 (CMS/HCC) [U07.1, J96.01]  Unknown   • Hypokalemia [E87.6]  Unknown   •  Diarrhea [R19.7]  Unknown   • Anemia [D64.9]  Yes   • Pregnancy [Z34.90]  Not Applicable      Resolved Hospital Problems   No resolved problems to display.     25 y.o. female admitted with Covid, now hypoxia.    • COVID-19 pneumonia  o SpO2:  [92 %-97 %] 92 %  on  Flow (L/min):  [2] 2  o Monitor inflammatory markers  o Continue supportive care  o Decadron  o Remdesivir, monitor LFTs  o Diarrhea from Covid  • Hypokalemia from GI losses: Replace  • Pregnancy, followed OB  · Lovenox 40 mg SC daily for DVT prophylaxis.  · Full code.  · Discussed with patient, nursing staff and care team on multidisciplinary rounds.  · Anticipate discharge home timing yet to be determined.    Andrea Smith MD  [facility]  08/09/21  14:09 EDT    I wore protective equipment throughout this patient encounter including N95 mask/CAPR, gloves, gown,and protective eyewear.  Hand hygiene was performed before donning protective equipment and after removal when leaving the room.     Electronically signed by Andrea Smith MD at 08/09/21 1416          Consult Notes (last 72 hours) (Notes from 08/06/21 1523 through 08/09/21 1523)      Jose Angel Silva DO at 08/09/21 1210      Consult Orders    1. Inpatient Infectious Diseases Consult [[id number]] ordered by Madison Wynne MD at 08/08/21 1912               Referring Provider: Madison Wynne MD  Reason for Consultation:   Chief Complaint   Patient presents with   • Shortness of Breath       Subjective   History of present illness: 25-year-old female with past medical history of migraines and currently 9 weeks pregnant presents after about a week history of shortness of breath, malaise, and decreased appetite.  Reports on Monday of last week she presented to her doctor and had Covid testing that was negative.  Symptoms continue to progress to where she was having difficulty getting out of bed and eventually presented on Wednesday for repeat testing and  was found to be positive.  Reports her sister is also sick.  States her shortness of breath is mostly with ambulation and she feels much better when resting.  Denies any nausea or vomiting however has had decreased appetite overall.  Denies any abdominal pain.  Had one episode of diarrhea yesterday.  Reports she has not been vaccinated in the past.    Past Medical History:   Diagnosis Date   • Abnormal Pap smear of cervix    • Anemia    • Migraine        Past Surgical History:   Procedure Laterality Date   • FOOT SURGERY      Per pt, to remove bones; had 2 procedures       Family history:  -Mother has hypertension and diabetes     reports that she has never smoked. She has never used smokeless tobacco. She reports that she does not drink alcohol and does not use drugs.     No Known Allergies    Medication:  Antibiotics:  Anti-Infectives (From admission, onward)    Ordered     Dose/Rate Route Frequency Start Stop    08/09/21 1003  remdesivir 100 mg in sodium chloride 0.9 % 270 mL IVPB (powder vial)     Ordering Provider: Jose Angel Silva DO    100 mg  over 60 Minutes Intravenous Every 24 Hours 08/10/21 1200 08/14/21 1159    08/09/21 1003  remdesivir 200 mg in sodium chloride 0.9 % 290 mL IVPB (powder vial)     Ordering Provider: Jose Angel Silva DO    200 mg  over 60 Minutes Intravenous Every 24 Hours 08/09/21 1200 08/10/21 1159    08/08/21 1245  cefTRIAXone (ROCEPHIN) IVPB 1 g     Ordering Provider: Maximilian Salinas MD    1 g  100 mL/hr over 30 Minutes Intravenous Once 08/08/21 1246 08/08/21 1410          Review of Systems  Pertinent items are noted in HPI, all other systems reviewed and negative    Objective     Physical Exam:   Vital Signs   Temp:  [98.8 °F (37.1 °C)-101.8 °F (38.8 °C)] 98.8 °F (37.1 °C)  Heart Rate:  [] 97  Resp:  [18-21] 21  BP: (113-143)/(66-94) 113/77    GENERAL: Awake and alert, in no acute distress.   HEENT: Oropharynx is clear. Hearing is grossly normal.   EYES: PERRL. No  conjunctival injection. No lid lag.   LYMPHATICS: No lymphadenopathy of the neck or inguinal regions.   HEART: Regular rate and regular rhythm. No peripheral edema.   LUNGS: Clear to auscultation anteriorly with normal respiratory effort.   GI: Soft, nontender, nondistended. No appreciable organomegaly.   SKIN: Warm and dry without cutaneous eruptions   PSYCHIATRIC: Appropriate mood, affect, insight, and judgment.     Results Review:   I reviewed the patient's new clinical results.  I reviewed the patient's new imaging results and agree with the interpretation.    Lab Results   Component Value Date    WBC 3.31 (L) 08/09/2021    HGB 10.3 (L) 08/09/2021    HCT 32.2 (L) 08/09/2021    MCV 83.0 08/09/2021     08/09/2021       No results found for: TYRONE DE LEON VANCORANDOM    Lab Results   Component Value Date    GLUCOSE 80 08/09/2021    BUN 3 (L) 08/09/2021    CREATININE 0.46 (L) 08/09/2021    EGFRIFAFRI >150 08/09/2021    BCR 6.5 (L) 08/09/2021    CO2 18.8 (L) 08/09/2021    CALCIUM 8.2 (L) 08/09/2021    ALBUMIN 3.40 (L) 08/09/2021    AST 39 (H) 08/09/2021    ALT 28 08/09/2021         Estimated Creatinine Clearance: 230 mL/min (A) (by C-G formula based on SCr of 0.46 mg/dL (L)).      Microbiology:  8/8 respiratory panel positive for Covid 19    8/8 blood cultures pending    Radiology:  8/8 chest x-ray with bilateral pneumonia consistent with a viral pneumonia such as COVID-19.    8/8 CT angio with bilateral consolidation consistent with pneumonia.    Assessment/Plan     #Acute hypoxic respiratory failure in the setting of COVID-19 pneumonia  #Pregnancy    -The setting of her hypoxia would now recommend therapy with remdesivir 100 mg daily after loading dose of 200 mg  (for 5 days or until discharge) as well as dexamethasone 6 mg daily (for 10 days or until discharge).  -Had a long detailed discussion with the patient regarding risks and benefits of these medications in the setting of pregnancy and the  minimal data available and after discussion she was agreeable to therapy with these agents.  -Recommend trending LFTs and creatinine while on remdesivir.  -Would also continue to follow CRP       Thank you for this consult.  We will continue to follow along and tailor antibiotics as the patient's clinical course evolves.      Electronically signed by Jose Angel Silva DO at 08/09/21 9374

## 2021-08-09 NOTE — PROGRESS NOTES
Name: Regine Martins ADMIT: 2021   : 1995  PCP: Ynes Andres MD    MRN: [id number] LOS: 1 days   AGE/SEX: 25 y.o. female  ROOM: [facility]     Subjective   Subjective   No shortness of breath at rest. Some with exertion. Saturations drop with exertion. Denies any nausea vomiting. Diarrhea resolved.    Greater than 50% of time spent in counseling and/or coordination of care. Total face/floor time 35 minutes.     Review of Systems   Constitutional: Negative for fever.   Respiratory: Positive for shortness of breath. Negative for cough.    Cardiovascular: Negative for chest pain.   Gastrointestinal: Negative for abdominal pain.      Objective   Objective   Vital Signs  Temp:  [98.8 °F (37.1 °C)-101.8 °F (38.8 °C)] 98.8 °F (37.1 °C)  Heart Rate:  [] 97  Resp:  [18-21] 21  BP: (113-127)/(66-85) 113/77  SpO2:  [92 %-97 %] 92 %  on  Flow (L/min):  [2] 2;   Device (Oxygen Therapy): nasal cannula  Body mass index is 35.74 kg/m².    Physical Exam  Constitutional:       Appearance: Normal appearance.   HENT:      Head: Normocephalic and atraumatic.   Cardiovascular:      Rate and Rhythm: Normal rate and regular rhythm.   Pulmonary:      Effort: Pulmonary effort is normal. No respiratory distress.      Breath sounds: Decreased breath sounds present.   Abdominal:      General: Bowel sounds are normal. There is no distension.      Palpations: Abdomen is soft.      Tenderness: There is no abdominal tenderness. There is no guarding or rebound.   Musculoskeletal:         General: No swelling.      Right lower leg: No edema.      Left lower leg: No edema.   Skin:     General: Skin is warm and dry.   Neurological:      General: No focal deficit present.      Mental Status: She is alert and oriented to person, place, and time.   Psychiatric:         Mood and Affect: Mood normal.         Behavior: Behavior normal.     Results Review  I reviewed the patient's new clinical results.  Results from last 7 days    Lab Units 08/09/21  0834 08/08/21  1201   WBC 10*3/mm3 3.31* 3.49   HEMOGLOBIN g/dL 10.3* 11.6*   PLATELETS 10*3/mm3 204 220     Results from last 7 days   Lab Units 08/09/21  0834 08/08/21  1201   SODIUM mmol/L 135* 133*   POTASSIUM mmol/L 3.1* 3.0*   CHLORIDE mmol/L 102 97*   CO2 mmol/L 18.8* 22.6   BUN mg/dL 3* 4*   CREATININE mg/dL 0.46* 0.59   GLUCOSE mg/dL 80 87     Lab Results   Component Value Date    ANIONGAP 14.2 08/09/2021     Estimated Creatinine Clearance: 230 mL/min (A) (by C-G formula based on SCr of 0.46 mg/dL (L)).    Results from last 7 days   Lab Units 08/09/21  0834 08/08/21  1201   ALBUMIN g/dL 3.40* 4.10   BILIRUBIN mg/dL 0.4 0.6   ALK PHOS U/L 63 68   AST (SGOT) U/L 39* 33*   ALT (SGPT) U/L 28 21     Results from last 7 days   Lab Units 08/09/21  0834 08/08/21  1201   CALCIUM mg/dL 8.2* 8.8   ALBUMIN g/dL 3.40* 4.10   MAGNESIUM mg/dL  --  2.2     Results from last 7 days   Lab Units 08/08/21  1328 08/08/21  1201   PROCALCITONIN ng/mL  --  0.09   LACTATE mmol/L 1.4  --          Scheduled Meds  dexamethasone, 6 mg, Oral, Daily With Breakfast  enoxaparin, 40 mg, Subcutaneous, Q24H  ferrous sulfate, 325 mg, Oral, Daily With Breakfast  prenatal vitamin, 1 tablet, Oral, Daily  [START ON 8/10/2021] remdesivir, 100 mg, Intravenous, Q24H    Continuous Infusions  Pharmacy Consult - Remdesivir,     PRN Meds  •  acetaminophen  •  ibuprofen  •  ondansetron **OR** ondansetron  •  Pharmacy Consult - Remdesivir  •  potassium chloride **OR** potassium chloride **OR** potassium chloride  •  [COMPLETED] Insert peripheral IV **AND** sodium chloride    Pharmacy Consult - Remdesivir,     Diet  Diet Regular      COVID LABS:  Results From Last 14 Days   Lab Units 08/08/21  1328 08/08/21  1201   PROBNP pg/mL  --  8.2   D DIMER QUANT MCGFEU/mL  --  0.91*   LACTATE mmol/L 1.4  --    PROCALCITONIN ng/mL  --  0.09   PROTIME Seconds  --  14.8*   INR   --  1.17*   TROPONIN T ng/mL  --  <0.010       Assessment/Plan      Active Hospital Problems    Diagnosis  POA   • **Acute hypoxemic respiratory failure due to COVID-19 (CMS/[facility]) [U07.1, J96.01]  Unknown   • Hypokalemia [E87.6]  Unknown   • Diarrhea [R19.7]  Unknown   • Anemia [D64.9]  Yes   • Pregnancy [Z34.90]  Not Applicable      Resolved Hospital Problems   No resolved problems to display.     25 y.o. female admitted with Covid, now hypoxia.    • COVID-19 pneumonia  o SpO2:  [92 %-97 %] 92 %  on  Flow (L/min):  [2] 2  o Monitor inflammatory markers  o Continue supportive care  o Decadron  o Remdesivir, monitor LFTs  o Diarrhea from Covid  • Hypokalemia from GI losses: Replace  • Pregnancy, followed OB  · Lovenox 40 mg SC daily for DVT prophylaxis.  · Full code.  · Discussed with patient, nursing staff and care team on multidisciplinary rounds.  · Anticipate discharge home timing yet to be determined.    Andrea Smith MD  [facility]  08/09/21  14:09 EDT    I wore protective equipment throughout this patient encounter including N95 mask/CAPR, gloves, gown,and protective eyewear.  Hand hygiene was performed before donning protective equipment and after removal when leaving the room.

## 2021-08-09 NOTE — PROGRESS NOTES
"Pharmacy Consult - Lovenox    Regine Char Martins has been consulted for pharmacy to dose enoxaparin for VTE prophylaxis per Dr. Smith's request.       Relevant clinical data and objective history reviewed:  25 y.o. female 170.2 cm (67\") 104 kg (228 lb 3.2 oz)        Past Medical History:   Diagnosis Date    Abnormal Pap smear of cervix     Anemia     Migraine      has No Known Allergies.    Lab Results   Component Value Date    WBC 3.31 (L) 08/09/2021    HGB 10.3 (L) 08/09/2021    HCT 32.2 (L) 08/09/2021    MCV 83.0 08/09/2021     08/09/2021     Lab Results   Component Value Date    GLUCOSE 80 08/09/2021    CALCIUM 8.2 (L) 08/09/2021     (L) 08/09/2021    K 3.1 (L) 08/09/2021    CO2 18.8 (L) 08/09/2021     08/09/2021    BUN 3 (L) 08/09/2021    CREATININE 0.46 (L) 08/09/2021    EGFRIFAFRI >150 08/09/2021    BCR 6.5 (L) 08/09/2021    ANIONGAP 14.2 08/09/2021       Estimated Creatinine Clearance: 230 mL/min (A) (by C-G formula based on SCr of 0.46 mg/dL (L)).      Assessment/Plan    Pt crcl is 230ml/min    Start enoxaparin 40 mg  subq every 24 hours.    Will follow dosing per renal dosing policy and discontinue pharmacy to dose consult    Thank you for the consult,    Compa Young [facility]    "

## 2021-08-09 NOTE — PROGRESS NOTES
[facility]  Clinical Pharmacy Department     Remdesivir Review Note    Regine Martins is a 25 y.o. female with confirmed COVID-19 infection on day 1 of hospitalization.     Consulting Provider:  Jose Angel Silva  Date of Confirmed SARS-CoV-2: 8/8 AT [facility], and 8/4 at outside source   Date of Symptom Onset: ~ 7 days  Planned Duration of Therapy: 5 days  Other Antimicrobials: none  Hydroxychloroquine or chloroquine prior to arrival: no    Allergies  Allergies as of 08/08/2021   • (No Known Allergies)       Microbiology:  Microbiology Results (last 10 days)     Procedure Component Value - Date/Time    Respiratory Panel PCR w/COVID-19(SARS-CoV-2) THUY/SARAH/OMAIRA/PAD/COR/MAD/ERICK In-House, NP Swab in UTM/VTM, 3-4 HR TAT - Swab, Nasopharynx [[id number]]  (Abnormal) Collected: 08/08/21 1201    Lab Status: Final result Specimen: Swab from Nasopharynx Updated: 08/08/21 1327     ADENOVIRUS, PCR Not Detected     Coronavirus 229E Not Detected     Coronavirus HKU1 Not Detected     Coronavirus NL63 Not Detected     Coronavirus OC43 Not Detected     COVID19 Detected     Human Metapneumovirus Not Detected     Human Rhinovirus/Enterovirus Not Detected     Influenza A PCR Not Detected     Influenza B PCR Not Detected     Parainfluenza Virus 1 Not Detected     Parainfluenza Virus 2 Not Detected     Parainfluenza Virus 3 Not Detected     Parainfluenza Virus 4 Not Detected     RSV, PCR Not Detected     Bordetella pertussis pcr Not Detected     Bordetella parapertussis PCR Not Detected     Chlamydophila pneumoniae PCR Not Detected     Mycoplasma pneumo by PCR Not Detected    Narrative:      In the setting of a positive respiratory panel with a viral infection PLUS a negative procalcitonin without other underlying concern for bacterial infection, consider observing off antibiotics or discontinuation of antibiotics and continue supportive care. If the respiratory panel is positive for atypical bacterial infection (Bordetella pertussis,  Chlamydophila pneumoniae, or Mycoplasma pneumoniae), consider antibiotic de-escalation to target atypical bacterial infection.            Vitals/Labs/I&O  Temp:  [98.4 °F (36.9 °C)-101.8 °F (38.8 °C)] 98.8 °F (37.1 °C)  Heart Rate:  [] 97  Resp:  [16-21] 21  BP: (113-143)/(66-94) 113/77    Results from last 7 days   Lab Units 08/09/21  0834 08/08/21  1201   WBC 10*3/mm3 3.31* 3.49     Results from last 7 days   Lab Units 08/08/21  1201   PROCALCITONIN ng/mL 0.09     Results from last 7 days   Lab Units 08/09/21  0834 08/08/21  1201   AST (SGOT) U/L 39* 33*      Results from last 7 days   Lab Units 08/09/21  0834 08/08/21  1201   ALT (SGPT) U/L 28 21       Estimated Creatinine Clearance: 230 mL/min (A) (by C-G formula based on SCr of 0.46 mg/dL (L)).  Results from last 7 days   Lab Units 08/09/21  0834 08/08/21  1201   BUN mg/dL 3* 4*   CREATININE mg/dL 0.46* 0.59     Assessment/Plan:    Patient is currently 9 weeks pregnant    Patient is hospitalized with confirmed, severe COVID-19 infection and started on remdesivir 200 mg IV once followed by 100 mg IV daily for 4 days (5 day total duration). All inclusions, exclusions, and monitoring requirements listed below have been reviewed.    1. Patient is hospitalized with confirmed COVID-19 infection  2. Patient is requiring supplemental oxygen to maintain oxygen saturations of ? 94%: 2L of [place]  3. Baseline and daily LFTs and Scr have been ordered prior to remdesivir initiation: ALT/AST: 28/39  4. ALT is not ? 10 times the upper limit of normal: 63  5. Patient is not on concomitant hydroxychloroquine or chloroquine       Thank you for involving pharmacy in this patient's care. Please contact pharmacy with any questions or concerns.                           Compa Young [facility]  Clinical Pharmacist  08/09/21 10:26 EDT

## 2021-08-09 NOTE — THERAPY EVALUATION
Patient Name: Regine Martins  : 1995    MRN: [id number]                              Today's Date: 2021       Admit Date: 2021    Visit Dx:     ICD-10-CM ICD-9-CM   1. Pneumonia due to COVID-19 virus  U07.1 480.8    J12.82 079.89   2. 9 weeks gestation of pregnancy  Z3A.09 V22.2     Patient Active Problem List   Diagnosis   • Anemia   • Pneumonia due to COVID-19 virus     Past Medical History:   Diagnosis Date   • Abnormal Pap smear of cervix    • Anemia    • Migraine      Past Surgical History:   Procedure Laterality Date   • FOOT SURGERY      Per pt, to remove bones; had 2 procedures     General Information     Row Name 21 [facility]          Physical Therapy Time and Intention    Document Type  evaluation  -MD     Mode of Treatment  physical therapy  -MD     Row Name 21 [facility]5          General Information    Patient Profile Reviewed  yes  -MD     Prior Level of Function  independent:  -MD     Existing Precautions/Restrictions  no known precautions/restrictions  -MD     Barriers to Rehab  none identified  -MD     Row Name 21 [facility]          Living Environment    Lives With  child(jeannette), dependent  -MD     Row Name 21 [facility]          Cognition    Orientation Status (Cognition)  oriented x 3  -MD       User Key  (r) = Recorded By, (t) = Taken By, (c) = Cosigned By    Initials Name Provider Type    Apryl Talley, PT Physical Therapist        Mobility     Row Name 21 1355          Bed Mobility    Bed Mobility  supine-sit  -MD     Supine-Sit [place] (Bed Mobility)  independent  -MD     Row Name 21 135          Sit-Stand Transfer    Sit-Stand [place] (Transfers)  supervision  -MD     Row Name 21 135          Gait/Stairs (Locomotion)    [place] Level (Gait)  supervision  -MD     Distance in Feet (Gait)  60'  -MD       User Key  (r) = Recorded By, (t) = Taken By, (c) = Cosigned By    Initials Name Provider Type    Apryl Talley, PT Physical Therapist         Obj/Interventions     Row Name 08/09/21 [facility]4          Range of Motion Comprehensive    General Range of Motion  no range of motion deficits identified  -MD     Row Name 08/09/21 8725          Strength Comprehensive (MMT)    General Manual Muscle Testing (MMT) Assessment  no strength deficits identified  -MD       User Key  (r) = Recorded By, (t) = Taken By, (c) = Cosigned By    Initials Name Provider Type    Apryl Talley, PT Physical Therapist        Goals/Plan    No documentation.       Clinical Impression     [facility] Name 08/09/21 7796          Pain    Additional Documentation  Pain Scale: Numbers Pre/Post-Treatment (Group)  -MD     [facility] Name 08/09/21 1356          Pain Scale: Numbers Pre/Post-Treatment    Pretreatment Pain Rating  0/10 - no pain  -MD     [facility] Name 08/09/21 [facility]          Plan of Care Review    Outcome Summary  Pt is 26 yo female presenting w COVID-19.  Prior to admission pt was independent w functional mobility.  At this time pts demonstrates independence/supervision with all mobility and is safe to ambulate w nsg/nsg assist. while at [facility].  PT educated pt to ambulate multiple times daily with nsg.  RN notified of the above.  PT will sign off at this time.  -MD     Row Name 08/09/21 9039          Therapy Assessment/Plan (PT)    Criteria for Skilled Interventions Met (PT)  no;no problems identified which require skilled intervention  -MD     Row Name 08/09/21 6828          Vital Signs    Post SpO2 (%)  88 recovered to 91% in approx 30 sec.  -MD     O2 Delivery Post Treatment  nasal cannula  -MD     Row Name 08/09/21 1356          Positioning and Restraints    Pre-Treatment Position  in bed  -MD     Post Treatment Position  chair  -MD     In Chair  reclined;sitting;call light within reach;encouraged to call for assist;notified nsg  -MD       User Key  (r) = Recorded By, (t) = Taken By, (c) = Cosigned By    Initials Name Provider Type    Apryl Talely, PT Physical Therapist        Outcome Measures      Row Name 08/09/21 1402          How much help from another person do you currently need...    Turning from your back to your side while in flat bed without using bedrails?  4  -MD     Moving from lying on back to sitting on the side of a flat bed without bedrails?  4  -MD     Moving to and from a bed to a chair (including a wheelchair)?  3  -MD     Standing up from a chair using your arms (e.g., wheelchair, bedside chair)?  3  -MD     Climbing 3-5 steps with a railing?  3  -MD     To walk in hospital room?  3  -MD     AM-PAC 6 Clicks Score (PT)  20  -MD     Row Name 08/09/21 1402          Functional Assessment    Outcome Measure Options  AM-PAC 6 Clicks Basic Mobility (PT)  -MD       User Key  (r) = Recorded By, (t) = Taken By, (c) = Cosigned By    Initials Name Provider Type    Apryl Talley, PT Physical Therapist                       Physical Therapy Education                 Title: PT OT SLP Therapies (In Progress)     Topic: Physical Therapy (In Progress)     Point: Mobility training (Not Started)     Learner Progress:  Not documented in this visit.          Point: Home exercise program (Not Started)     Learner Progress:  Not documented in this visit.          Point: Body mechanics (Not Started)     Learner Progress:  Not documented in this visit.          Point: Precautions (Done)     Learning Progress Summary           Patient Acceptance, E, VU by MD at 8/9/2021 1402                               User Key     Initials Effective Dates Name Provider Type Discipline    MD 06/16/21 -  Apryl Burrows, PT Physical Therapist PT              PT Recommendation and Plan     Outcome Summary: Pt is 26 yo female presenting w COVID-19.  Prior to admission pt was independent w functional mobility.  At this time pts demonstrates independence/supervision with all mobility and is safe to ambulate w nsg/nsg assist. while at [facility].  PT educated pt to ambulate multiple times daily with nsg.  RN notified of the above.  PT will  sign off at this time.     Time Calculation:   PT Charges     Row Name 08/09/21 1133             Time Calculation    Start Time  1109  -MD      Stop Time  1130  -MD      Time Calculation (min)  21 min  -MD      PT Received On  08/09/21  -MD        User Key  (r) = Recorded By, (t) = Taken By, (c) = Cosigned By    Initials Name Provider Type    Apryl Talley, PT Physical Therapist        Therapy Charges for Today     Code Description Service Date Service Provider Modifiers Qty    04236730971 HC PT EVAL LOW COMPLEXITY 1 8/9/2021 Apryl Burrows, PT GP 1    [id number]  PT THER PROC EA 15 MIN 8/9/2021 Apryl Burrows, PT GP 1        Patient was wearing a face mask during this therapy encounter. Therapist used appropriate personal protective equipment including gown, eye protection, mask and gloves.  Mask used was N95/duckbill. Appropriate PPE was worn during the entire therapy session. Hand hygiene was completed before and after therapy session. Patient is in enhanced droplet precautions.       PT G-Codes  Outcome Measure Options: AM-PAC 6 Clicks Basic Mobility (PT)  AM-PAC 6 Clicks Score (PT): 20    Apryl Burrows PT  8/9/2021

## 2021-08-09 NOTE — PLAN OF CARE
Goal Outcome Evaluation:              Outcome Summary: Pt is 26 yo female presenting w COVID-19.  Prior to admission pt was independent w functional mobility.  At this time pts demonstrates independence/supervision with all mobility and is safe to ambulate w nsg/nsg assist. while at [facility].  PT educated pt to ambulate multiple times daily with nsg.  RN notified of the above.  PT will sign off at this time.

## 2021-08-09 NOTE — CONSULTS
Referring Provider: Madison Wynne MD  Reason for Consultation:   Chief Complaint   Patient presents with   • Shortness of Breath       Subjective   History of present illness: 25-year-old female with past medical history of migraines and currently 9 weeks pregnant presents after about a week history of shortness of breath, malaise, and decreased appetite.  Reports on Monday of last week she presented to her doctor and had Covid testing that was negative.  Symptoms continue to progress to where she was having difficulty getting out of bed and eventually presented on Wednesday for repeat testing and was found to be positive.  Reports her sister is also sick.  States her shortness of breath is mostly with ambulation and she feels much better when resting.  Denies any nausea or vomiting however has had decreased appetite overall.  Denies any abdominal pain.  Had one episode of diarrhea yesterday.  Reports she has not been vaccinated in the past.    Past Medical History:   Diagnosis Date   • Abnormal Pap smear of cervix    • Anemia    • Migraine        Past Surgical History:   Procedure Laterality Date   • FOOT SURGERY      Per pt, to remove bones; had 2 procedures       Family history:  -Mother has hypertension and diabetes     reports that she has never smoked. She has never used smokeless tobacco. She reports that she does not drink alcohol and does not use drugs.     No Known Allergies    Medication:  Antibiotics:  Anti-Infectives (From admission, onward)    Ordered     Dose/Rate Route Frequency Start Stop    08/09/21 1003  remdesivir 100 mg in sodium chloride 0.9 % 270 mL IVPB (powder vial)     Ordering Provider: Jose Angel Silva DO    100 mg  over 60 Minutes Intravenous Every 24 Hours 08/10/21 1200 08/14/21 1159    08/09/21 1003  remdesivir 200 mg in sodium chloride 0.9 % 290 mL IVPB (powder vial)     Ordering Provider: Jose Angel Silva DO    200 mg  over 60 Minutes Intravenous Every 24 Hours  08/09/21 1200 08/10/21 1159    08/08/21 1245  cefTRIAXone (ROCEPHIN) IVPB 1 g     Ordering Provider: Maximilian Salinas MD    1 g  100 mL/hr over 30 Minutes Intravenous Once 08/08/21 1246 08/08/21 1410          Review of Systems  Pertinent items are noted in HPI, all other systems reviewed and negative    Objective     Physical Exam:   Vital Signs   Temp:  [98.8 °F (37.1 °C)-101.8 °F (38.8 °C)] 98.8 °F (37.1 °C)  Heart Rate:  [] 97  Resp:  [18-21] 21  BP: (113-143)/(66-94) 113/77    GENERAL: Awake and alert, in no acute distress.   HEENT: Oropharynx is clear. Hearing is grossly normal.   EYES: PERRL. No conjunctival injection. No lid lag.   LYMPHATICS: No lymphadenopathy of the neck or inguinal regions.   HEART: Regular rate and regular rhythm. No peripheral edema.   LUNGS: Clear to auscultation anteriorly with normal respiratory effort.   GI: Soft, nontender, nondistended. No appreciable organomegaly.   SKIN: Warm and dry without cutaneous eruptions   PSYCHIATRIC: Appropriate mood, affect, insight, and judgment.     Results Review:   I reviewed the patient's new clinical results.  I reviewed the patient's new imaging results and agree with the interpretation.    Lab Results   Component Value Date    WBC 3.31 (L) 08/09/2021    HGB 10.3 (L) 08/09/2021    HCT 32.2 (L) 08/09/2021    MCV 83.0 08/09/2021     08/09/2021       No results found for: TYRONE DE LEON VANCORANDOM    Lab Results   Component Value Date    GLUCOSE 80 08/09/2021    BUN 3 (L) 08/09/2021    CREATININE 0.46 (L) 08/09/2021    EGFRIFAFRI >150 08/09/2021    BCR 6.5 (L) 08/09/2021    CO2 18.8 (L) 08/09/2021    CALCIUM 8.2 (L) 08/09/2021    ALBUMIN 3.40 (L) 08/09/2021    AST 39 (H) 08/09/2021    ALT 28 08/09/2021         Estimated Creatinine Clearance: 230 mL/min (A) (by C-G formula based on SCr of 0.46 mg/dL (L)).      Microbiology:  8/8 respiratory panel positive for Covid 19    8/8 blood cultures pending    Radiology:  8/8 chest x-ray  with bilateral pneumonia consistent with a viral pneumonia such as COVID-19.    8/8 CT angio with bilateral consolidation consistent with pneumonia.    Assessment/Plan     #Acute hypoxic respiratory failure in the setting of COVID-19 pneumonia  #Pregnancy    -The setting of her hypoxia would now recommend therapy with remdesivir 100 mg daily after loading dose of 200 mg  (for 5 days or until discharge) as well as dexamethasone 6 mg daily (for 10 days or until discharge).  -Had a long detailed discussion with the patient regarding risks and benefits of these medications in the setting of pregnancy and the minimal data available and after discussion she was agreeable to therapy with these agents.  -Recommend trending LFTs and creatinine while on remdesivir.  -Would also continue to follow CRP       Thank you for this consult.  We will continue to follow along and tailor antibiotics as the patient's clinical course evolves.     This document is complete and the patient is ready for discharge.

## 2021-08-09 NOTE — PLAN OF CARE
Goal Outcome Evaluation:           Progress: no change  Outcome Summary: No complaints of pain throughout the night. Patient able to walk to bathroom, however, her HR jumps to 130s/140s with exertion. 2L NC applied due to 02 level dropping to 88%. No episodes of diarrhea.Patient had ongoing fever which was treated with Tylenol. Tylenol order updated by Natasha Muñoz to include fever as an indication. Educated patient on incentive spirometer. Infectious disease consult called in. Dr. Baker OB was consulted but stated he cannot see her as she is already seen by a provider at [facility]. We need to call them after [phone] for consult.

## 2021-08-10 LAB
ALBUMIN SERPL-MCNC: 3.1 G/DL (ref 3.5–5.2)
ALBUMIN/GLOB SERPL: 0.9 G/DL
ALP SERPL-CCNC: 62 U/L (ref 39–117)
ALT SERPL W P-5'-P-CCNC: 34 U/L (ref 1–33)
ANION GAP SERPL CALCULATED.3IONS-SCNC: 11.1 MMOL/L (ref 5–15)
AST SERPL-CCNC: 38 U/L (ref 1–32)
BASOPHILS # BLD AUTO: 0.01 10*3/MM3 (ref 0–0.2)
BASOPHILS NFR BLD AUTO: 0.2 % (ref 0–1.5)
BILIRUB CONJ SERPL-MCNC: <0.2 MG/DL (ref 0–0.3)
BILIRUB SERPL-MCNC: 0.4 MG/DL (ref 0–1.2)
BUN SERPL-MCNC: 5 MG/DL (ref 6–20)
BUN/CREAT SERPL: 11.1 (ref 7–25)
CALCIUM SPEC-SCNC: 8.4 MG/DL (ref 8.6–10.5)
CHLORIDE SERPL-SCNC: 103 MMOL/L (ref 98–107)
CO2 SERPL-SCNC: 20.9 MMOL/L (ref 22–29)
CREAT SERPL-MCNC: 0.45 MG/DL (ref 0.57–1)
CRP SERPL-MCNC: 10.7 MG/DL (ref 0–0.5)
DEPRECATED RDW RBC AUTO: 45.6 FL (ref 37–54)
EOSINOPHIL # BLD AUTO: 0 10*3/MM3 (ref 0–0.4)
EOSINOPHIL NFR BLD AUTO: 0 % (ref 0.3–6.2)
ERYTHROCYTE [DISTWIDTH] IN BLOOD BY AUTOMATED COUNT: 14.8 % (ref 12.3–15.4)
FERRITIN SERPL-MCNC: 249 NG/ML (ref 13–150)
GFR SERPL CREATININE-BSD FRML MDRD: >150 ML/MIN/1.73
GLOBULIN UR ELPH-MCNC: 3.4 GM/DL
GLUCOSE SERPL-MCNC: 85 MG/DL (ref 65–99)
HCT VFR BLD AUTO: 33.3 % (ref 34–46.6)
HGB BLD-MCNC: 10.4 G/DL (ref 12–15.9)
IMM GRANULOCYTES # BLD AUTO: 0.03 10*3/MM3 (ref 0–0.05)
IMM GRANULOCYTES NFR BLD AUTO: 0.6 % (ref 0–0.5)
LYMPHOCYTES # BLD AUTO: 1.09 10*3/MM3 (ref 0.7–3.1)
LYMPHOCYTES NFR BLD AUTO: 22.5 % (ref 19.6–45.3)
MCH RBC QN AUTO: 26.3 PG (ref 26.6–33)
MCHC RBC AUTO-ENTMCNC: 31.2 G/DL (ref 31.5–35.7)
MCV RBC AUTO: 84.3 FL (ref 79–97)
MONOCYTES # BLD AUTO: 0.27 10*3/MM3 (ref 0.1–0.9)
MONOCYTES NFR BLD AUTO: 5.6 % (ref 5–12)
NEUTROPHILS NFR BLD AUTO: 3.44 10*3/MM3 (ref 1.7–7)
NEUTROPHILS NFR BLD AUTO: 71.1 % (ref 42.7–76)
NRBC BLD AUTO-RTO: 0 /100 WBC (ref 0–0.2)
PLATELET # BLD AUTO: 259 10*3/MM3 (ref 140–450)
PMV BLD AUTO: 10.7 FL (ref 6–12)
POTASSIUM SERPL-SCNC: 3.4 MMOL/L (ref 3.5–5.2)
POTASSIUM SERPL-SCNC: 3.9 MMOL/L (ref 3.5–5.2)
PROT SERPL-MCNC: 6.5 G/DL (ref 6–8.5)
RBC # BLD AUTO: 3.95 10*6/MM3 (ref 3.77–5.28)
SODIUM SERPL-SCNC: 135 MMOL/L (ref 136–145)
WBC # BLD AUTO: 4.84 10*3/MM3 (ref 3.4–10.8)

## 2021-08-10 PROCEDURE — 63710000001 DEXAMETHASONE PER 0.25 MG: Performed by: HOSPITALIST

## 2021-08-10 PROCEDURE — 99232 SBSQ HOSP IP/OBS MODERATE 35: CPT | Performed by: STUDENT IN AN ORGANIZED HEALTH CARE EDUCATION/TRAINING PROGRAM

## 2021-08-10 PROCEDURE — 85025 COMPLETE CBC W/AUTO DIFF WBC: CPT | Performed by: HOSPITALIST

## 2021-08-10 PROCEDURE — 82728 ASSAY OF FERRITIN: CPT | Performed by: HOSPITALIST

## 2021-08-10 PROCEDURE — 86140 C-REACTIVE PROTEIN: CPT | Performed by: HOSPITALIST

## 2021-08-10 PROCEDURE — 80053 COMPREHEN METABOLIC PANEL: CPT | Performed by: HOSPITALIST

## 2021-08-10 PROCEDURE — 25010000002 ENOXAPARIN PER 10 MG: Performed by: HOSPITALIST

## 2021-08-10 PROCEDURE — 84132 ASSAY OF SERUM POTASSIUM: CPT | Performed by: HOSPITALIST

## 2021-08-10 PROCEDURE — 82248 BILIRUBIN DIRECT: CPT | Performed by: STUDENT IN AN ORGANIZED HEALTH CARE EDUCATION/TRAINING PROGRAM

## 2021-08-10 RX ADMIN — ENOXAPARIN SODIUM 40 MG: 40 INJECTION SUBCUTANEOUS at 08:30

## 2021-08-10 RX ADMIN — POTASSIUM CHLORIDE 40 MEQ: 750 TABLET, EXTENDED RELEASE ORAL at 15:06

## 2021-08-10 RX ADMIN — Medication 1 TABLET: at 08:31

## 2021-08-10 RX ADMIN — SODIUM CHLORIDE, PRESERVATIVE FREE 10 ML: 5 INJECTION INTRAVENOUS at 08:31

## 2021-08-10 RX ADMIN — ACETAMINOPHEN 650 MG: 325 TABLET, FILM COATED ORAL at 08:35

## 2021-08-10 RX ADMIN — FERROUS SULFATE TAB 325 MG (65 MG ELEMENTAL FE) 325 MG: 325 (65 FE) TAB at 08:31

## 2021-08-10 RX ADMIN — REMDESIVIR 100 MG: 100 INJECTION, POWDER, LYOPHILIZED, FOR SOLUTION INTRAVENOUS at 13:41

## 2021-08-10 RX ADMIN — DEXAMETHASONE 6 MG: 4 TABLET ORAL at 08:31

## 2021-08-10 RX ADMIN — POTASSIUM CHLORIDE 40 MEQ: 750 TABLET, EXTENDED RELEASE ORAL at 10:24

## 2021-08-10 NOTE — PROGRESS NOTES
LOS: 2 days     Chief Complaint:  COVID pneumonia     Interval History: Patient reports she had somewhat of a tough night due to labored breathing when getting up out of bed to the bathroom.  Also states she had difficulty setting up.  Felt more short of breath on her left side.  Also felt somewhat feverish however no chills.  Denies any nausea, vomiting, diarrhea.  Does have decreased appetite.  States he is tolerating her medications well.  Also has increased oxygen requirement up to 6 L.    Vital Signs  Temp:  [98.2 °F (36.8 °C)-101.5 °F (38.6 °C)] 101.5 °F (38.6 °C)  Heart Rate:  [] 115  Resp:  [20-24] 20  BP: (116-132)/(78-90) 122/78    Physical Exam:  General: In no acute distress  HEENT: Oropharynx clear, moist mucous membranes  Cardiovascular: RRR, normal S1 and S2, no M/R/G  Respiratory: Clear to ascultation bilaterally, no wheezing  GI: Soft, NT/ND, + bowel sounds bilaterally, no masses  Skin: No rashes or lesions  Extremities: No edema, cyanosis  Access: Peripheral IV    Antibiotics:  Anti-Infectives (From admission, onward)    Ordered     Dose/Rate Route Frequency Start Stop    08/09/21 1003  remdesivir 100 mg in sodium chloride 0.9 % 270 mL IVPB (powder vial)     Ordering Provider: Jose Angel Silva DO    100 mg  over 60 Minutes Intravenous Every 24 Hours 08/10/21 1200 08/14/21 1159    08/09/21 1003  remdesivir 200 mg in sodium chloride 0.9 % 290 mL IVPB (powder vial)     Ordering Provider: Jose Angel Silva DO    200 mg  over 60 Minutes Intravenous Every 24 Hours 08/09/21 1200 08/09/21 1404    08/08/21 1245  cefTRIAXone (ROCEPHIN) IVPB 1 g     Ordering Provider: Maximilian Salinas MD    1 g  100 mL/hr over 30 Minutes Intravenous Once 08/08/21 1246 08/08/21 1410           Results Review:     I reviewed the patient's new clinical results.    Lab Results   Component Value Date    WBC 4.84 08/10/2021    HGB 10.4 (L) 08/10/2021    HCT 33.3 (L) 08/10/2021    MCV 84.3 08/10/2021      08/10/2021     Lab Results   Component Value Date    GLUCOSE 85 08/10/2021    BUN 5 (L) 08/10/2021    CREATININE 0.45 (L) 08/10/2021    EGFRIFAFRI >150 08/10/2021    BCR 11.1 08/10/2021    CO2 20.9 (L) 08/10/2021    CALCIUM 8.4 (L) 08/10/2021    ALBUMIN 3.10 (L) 08/10/2021    AST 38 (H) 08/10/2021    ALT 34 (H) 08/10/2021       Microbiology:  8/8 respiratory panel positive for Covid 19     8/8 blood cultures no growth     Radiology:  8/8 chest x-ray with bilateral pneumonia consistent with a viral pneumonia such as COVID-19.     8/8 CT angio with bilateral consolidation consistent with pneumonia.    8/8 normal duplex of lower extremity    Assessment/Plan   #Acute hypoxic respiratory failure in the setting of COVID-19 pneumonia  #Pregnancy     -Continue remdesivir 100 mg daily (for 5 days or until discharge) as well as dexamethasone 6 mg daily (for 10 days or until discharge).  -Continued risk-benefit discussion with the patient and due to her worsening respiratory status is still agreeable to continuing with dexamethasone and remdesivir therapy in the setting of pregnancy.  -trend LFTs and creatinine while on remdesivir.  -follow CRP and ferritin for monitoring of response        ID will follow.

## 2021-08-10 NOTE — CONSULTS
Group: [facility]         CONSULT NOTE    Patient Identification:  Regine Martins  25 y.o.  female  1995  [id number]            Requesting physician: Dr. Andrea Smith    Reason for Consultation: COVID-19 pneumonia    CC: Cough and shortness of breath    History of Present Illness:  25-year-old -American female who presents for shortness of breath and cough.  She is 9 weeks pregnant.  She has had symptoms for about 7 days of progressively worsening shortness of breath, chills, muscle aches, malaise and anorexia.  She was negative last Monday when she tested for COVID-19.  Her symptoms have continued to progress and worsen to the point where she presented on Wednesday last week for repeat testing and was found to be positive.  She becomes short of breath when she walks but not at rest.  She does have a cough but not producing much sputum.  She has not been vaccinated.  I reviewed H&P dictated by the hospitalist as well as consultation by Dr. Silva, infectious diseases.      Review of Systems   Constitutional: Positive for chills, diaphoresis and fatigue. Negative for fever.   HENT: Negative for ear discharge and sore throat.    Eyes: Negative for pain and visual disturbance.   Respiratory: Positive for cough and shortness of breath.    Cardiovascular: Negative for chest pain and leg swelling.   Gastrointestinal: Negative for abdominal pain and diarrhea.   Endocrine: Negative for cold intolerance and polyuria.   Genitourinary: Negative for dysuria and hematuria.   Musculoskeletal: Positive for myalgias. Negative for joint swelling.   Skin: Negative for rash and wound.   Neurological: Negative for speech difficulty and numbness.   Hematological: Negative for adenopathy. Does not bruise/bleed easily.   Psychiatric/Behavioral: Negative for agitation and confusion.       Past Medical History:  Past Medical History:   Diagnosis Date   • Abnormal Pap smear of cervix    • Anemia    • Migraine   "      Past Surgical History:  Past Surgical History:   Procedure Laterality Date   • FOOT SURGERY      Per pt, to remove bones; had 2 procedures        Home Meds:  Medications Prior to Admission   Medication Sig Dispense Refill Last Dose   • ferrous sulfate 325 (65 FE) MG tablet Take 325 mg by mouth Daily With Breakfast.   Past Week at Unknown time   • ibuprofen (ADVIL,MOTRIN) 600 MG tablet Take 1 tablet by mouth Every 8 (Eight) Hours As Needed for Mild Pain . 30 tablet 3 Past Week at Unknown time   • Prenatal Vit-Fe Fumarate-FA (PRENATAL, CLASSIC, VITAMIN) 28-0.8 MG tablet tablet Take 1 tablet by mouth Daily.   Past Week at Unknown time   • valACYclovir (VALTREX) 500 MG tablet Take 500 mg by mouth 2 (Two) Times a Day.   Past Week at Unknown time       Allergies:  No Known Allergies    Social History:   Social History     Socioeconomic History   • Marital status: Single     Spouse name: Not on file   • Number of children: Not on file   • Years of education: Not on file   • Highest education level: Not on file   Tobacco Use   • Smoking status: Never Smoker   • Smokeless tobacco: Never Used   Substance and Sexual Activity   • Alcohol use: No   • Drug use: No   • Sexual activity: Defer       Family History:  History reviewed. No pertinent family history.    Physical Exam:  /69 (BP Location: Left arm, Patient Position: Sitting)   Pulse 97   Temp 98 °F (36.7 °C) (Oral)   Resp 20   Ht 170.2 cm (67\")   Wt 103 kg (227 lb 8.2 oz)   SpO2 97%   BMI 35.63 kg/m²  Body mass index is 35.63 kg/m². 97% 103 kg (227 lb 8.2 oz)  Physical Exam  HENT:      Right Ear: External ear normal.      Left Ear: External ear normal.      Nose: Nose normal.   Eyes:      Conjunctiva/sclera: Conjunctivae normal.      Pupils: Pupils are equal, round, and reactive to light.   Neck:      Thyroid: No thyromegaly.      Vascular: No JVD.      Trachea: No tracheal deviation.   Cardiovascular:      Rate and Rhythm: Normal rate and regular " rhythm.      Heart sounds: Normal heart sounds. No murmur heard.     Pulmonary:      Effort: Pulmonary effort is normal.      Breath sounds: Normal breath sounds.   Abdominal:      Palpations: Abdomen is soft.      Tenderness: There is no abdominal tenderness.      Comments: Difficult to tell if there is a gravid uterus to palpation   Musculoskeletal:         General: No deformity. Normal range of motion.      Cervical back: Neck supple. No rigidity.   Skin:     General: Skin is warm.      Findings: No rash.      Comments: No palpable nodules   Neurological:      General: No focal deficit present.      Mental Status: She is alert and oriented to person, place, and time.      Cranial Nerves: No cranial nerve deficit.      Motor: No weakness.   Psychiatric:         Mood and Affect: Mood normal.         Thought Content: Thought content normal.         LABS:  COVID19   Date Value Ref Range Status   08/08/2021 Detected (C) Not Detected - Ref. Range Final       Lab Results   Component Value Date    CALCIUM 8.4 (L) 08/10/2021     Results from last 7 days   Lab Units 08/10/21  0546 08/09/21  0834 08/09/21  0834 08/08/21  1201 08/08/21  1201   MAGNESIUM mg/dL  --   --   --   --  2.2   SODIUM mmol/L 135*  --  135*  --  133*   POTASSIUM mmol/L 3.4*  --  3.1*  --  3.0*   CHLORIDE mmol/L 103  --  102  --  97*   CO2 mmol/L 20.9*  --  18.8*  --  22.6   BUN mg/dL 5*  --  3*  --  4*   CREATININE mg/dL 0.45*  --  0.46*  --  0.59   GLUCOSE mg/dL 85   < > 80   < > 87   CALCIUM mg/dL 8.4*  --  8.2*  --  8.8   WBC 10*3/mm3 4.84  --  3.31*  --  3.49   HEMOGLOBIN g/dL 10.4*  --  10.3*  --  11.6*   PLATELETS 10*3/mm3 259  --  204  --  220   ALT (SGPT) U/L 34*  --  28  --  21   AST (SGOT) U/L 38*  --  39*  --  33*   PROBNP pg/mL  --   --   --   --  8.2   PROCALCITONIN ng/mL  --   --   --   --  0.09    < > = values in this interval not displayed.     Lab Results   Component Value Date    TROPONINT <0.010 08/08/2021     Results from last 7  days   Lab Units 08/08/21  1201   TROPONIN T ng/mL <0.010     Results from last 7 days   Lab Units 08/08/21  1328   BLOODCX  No growth at 2 days  No growth at 2 days     Results from last 7 days   Lab Units 08/08/21  1328 08/08/21  1201   PROCALCITONIN ng/mL  --  0.09   LACTATE mmol/L 1.4  --          Results from last 7 days   Lab Units 08/08/21  1201   ADENOVIRUS DETECTION BY PCR  Not Detected   CORONAVIRUS 229E  Not Detected   CORONAVIRUS HKU1  Not Detected   CORONAVIRUS NL63  Not Detected   CORONAVIRUS OC43  Not Detected   HUMAN METAPNEUMOVIRUS  Not Detected   HUMAN RHINOVIRUS/ENTEROVIRUS  Not Detected   INFLUENZA B PCR  Not Detected   PARAINFLUENZA 1  Not Detected   PARAINFLUENZA VIRUS 2  Not Detected   PARAINFLUENZA VIRUS 3  Not Detected   PARAINFLUENZA VIRUS 4  Not Detected   BORDETELLA PERTUSSIS PCR  Not Detected   BORDETELLA PARAPERTUSSIS PCR  Not Detected   CHLAMYDOPHILA PNEUMONIAE PCR  Not Detected   MYCOPLAMA PNEUMO PCR  Not Detected   RSV, PCR  Not Detected     Results from last 7 days   Lab Units 08/08/21  1201   INR  1.17*     Results from last 7 days   Lab Units 08/08/21  1328   BLOODCX  No growth at 2 days  No growth at 2 days     No results found for: TSH  Estimated Creatinine Clearance: 233.9 mL/min (A) (by C-G formula based on SCr of 0.45 mg/dL (L)).  Results from last 7 days   Lab Units 08/08/21  1231   NITRITE UA  Negative   WBC UA /HPF None Seen   BACTERIA UA /HPF 1+*   SQUAM EPITHEL UA /HPF 3-6*        Imaging: I personally visualized the images of CT of the chest showing bilateral pulmonary infiltrates.      Assessment:  Acute hypoxemic respiratory failure due to COVID-19 (CMS/[facility])  COVID-19 pneumonia  Hypokalemia  Leukopenia  9 weeks pregnant    Recommendations:  CT scan images reviewed and patient evidently has pneumonia.  Tested positive for COVID-19 infection.  Recommend supplemental oxygen and IV daily Decadron, 6 mg.  Would also recommend remdesivir dosing.  Monitor renal and hepatic  function daily.  Hopefully this will be a self-limited disease without any long-term significant sequela I.    Patient was placed in face mask upon entering room and kept mask on throughout our encounter. I wore full protective equipment throughout this patient encounter including a face mask, gown and gloves. Hand hygiene was performed before donning protective equipment and after removal when leaving the room.    Johnny Victoria MD  8/10/2021  19:30 EDT      Much of this encounter note is an electronic transcription/translation of spoken language to printed text using Dragon Software.

## 2021-08-10 NOTE — PLAN OF CARE
Goal Outcome Evaluation:  Plan of Care Reviewed With: patient        Progress: improving  Outcome Summary: Pt has had no complaints of any pains or discomforts. Remains on oxygen, but has been decreased from 6 to 4L. Pulm consulted. Pt was tachycardiac earlier this shift, and is now normal sinus sinus. Fever this AM with temp as high as 101.5. Afebrile this afternoon. OBGYN consult. Up to chair for several hours this afternoon. K+ replaced. Stand by assist. VSS. Will continue to monitor.

## 2021-08-10 NOTE — CONSULTS
[facility]  Obstetric History and Physical    Patient Name: Regine Martins  :  1995  MRN:  [id number]        Chief Complaint   Patient presents with   • Shortness of Breath       Subjective     Patient is a 25 y.o. female  currently at 9w5d, who presented to [facility] ED with repiratory complaints and found to has Pneumonia with Covid 19. Denies vaginal bleeding or cramping.        Past Medical History: Past Medical History:   Diagnosis Date   • Abnormal Pap smear of cervix    • Anemia    • Migraine       Past Surgical History Past Surgical History:   Procedure Laterality Date   • FOOT SURGERY      Per pt, to remove bones; had 2 procedures      Family History: History reviewed. No pertinent family history.   Social History:  reports that she has never smoked. She has never used smokeless tobacco.   reports no history of alcohol use.   reports no history of drug use.    Allergies:     Patient has no known allergies.     Past OB History:       OB History    Para Term  AB Living   2 1 1 0 0 1   SAB TAB Ectopic Molar Multiple Live Births   0 0 0 0 0 1      # Outcome Date GA Lbr Issac/2nd Weight Sex Delivery Anes PTL Lv   2 Current            1 Term 18 40w0d 16:35 / 01:44 3260 g (7 lb 3 oz) F Vag-Spont EPI N PORFIRIO      Birth Comments: Scale 5      Name: NADIA MARTINS      Apgar1: 8  Apgar5: 9         Objective       Vital Signs Range for the last 24 hours  Temperature: Temp:  [98 °F (36.7 °C)-101.5 °F (38.6 °C)] 98 °F (36.7 °C)   Temp Source: Temp src: Oral   BP: BP: (116-133)/(69-90) 133/69   Pulse: Heart Rate:  [] 97   Respirations: Resp:  [20-24] 20             Physical Exam:        General :    Alert and cooperative in NAD   Abdomen:  Gravid, non-tender, no masses   Vaginal exam: deferred      A/P:  IUP at 9w5d admitted with Covid pneumonia  Thank you for the consult. Agree with current management of Covid complications. Nothing to add from obstetrics perspective. Please  feel free to call with any obstetrical questions or changes. We are happy to come back to see patient if needed.      Problem List Items Addressed This Visit        Other    * (Principal) Acute hypoxemic respiratory failure due to COVID-19 (CMS/[facility]) - Primary      Other Visit Diagnoses     9 weeks gestation of pregnancy                      Deborah Baker MD  8/10/2021  14:11 EDT

## 2021-08-10 NOTE — PROGRESS NOTES
Name: Regine Martins ADMIT: 2021   : 1995  PCP: Ynes Andres MD    MRN: [id number] LOS: 2 days   AGE/SEX: 25 y.o. female  ROOM: [facility]     Subjective   Subjective    Currently she feels fine but had shortness of breath earlier and dyspnea on exertion ambulating in her room.  Sitting in a chair. Diarrhea improved. Not much of an appetite.    Review of Systems   Constitutional: Negative for fever.   Respiratory: Positive for shortness of breath. Negative for cough.    Cardiovascular: Negative for chest pain.   Gastrointestinal: Negative for abdominal pain.      Objective   Objective   Vital Signs  Temp:  [98 °F (36.7 °C)-101.5 °F (38.6 °C)] 98 °F (36.7 °C)  Heart Rate:  [] 97  Resp:  [20-24] 20  BP: (116-133)/(69-90) 133/69  SpO2:  [92 %-96 %] 95 %  on  Flow (L/min):  [2-6] 4;   Device (Oxygen Therapy): nasal cannula  Body mass index is 35.63 kg/m².    Physical Exam  Constitutional:       Appearance: Normal appearance.   HENT:      Head: Normocephalic and atraumatic.   Cardiovascular:      Rate and Rhythm: Normal rate and regular rhythm.   Pulmonary:      Effort: Pulmonary effort is normal. No respiratory distress.      Breath sounds: Decreased breath sounds present.   Abdominal:      General: Bowel sounds are normal. There is no distension.      Palpations: Abdomen is soft.      Tenderness: There is no abdominal tenderness. There is no guarding or rebound.   Musculoskeletal:         General: No swelling.      Right lower leg: No edema.      Left lower leg: No edema.   Skin:     General: Skin is warm and dry.   Neurological:      General: No focal deficit present.      Mental Status: She is alert and oriented to person, place, and time.   Psychiatric:         Mood and Affect: Mood normal.         Behavior: Behavior normal.     Results Review  I reviewed the patient's new clinical results.  Results from last 7 days   Lab Units 08/10/21  0546 21  0834 21  1201   WBC  10*3/mm3 4.84 3.31* 3.49   HEMOGLOBIN g/dL 10.4* 10.3* 11.6*   PLATELETS 10*3/mm3 259 204 220     Results from last 7 days   Lab Units 08/10/21  0546 08/09/21  0834 08/08/21  1201   SODIUM mmol/L 135* 135* 133*   POTASSIUM mmol/L 3.4* 3.1* 3.0*   CHLORIDE mmol/L 103 102 97*   CO2 mmol/L 20.9* 18.8* 22.6   BUN mg/dL 5* 3* 4*   CREATININE mg/dL 0.45* 0.46* 0.59   GLUCOSE mg/dL 85 80 87     Lab Results   Component Value Date    ANIONGAP 11.1 08/10/2021     Estimated Creatinine Clearance: 233.9 mL/min (A) (by C-G formula based on SCr of 0.45 mg/dL (L)).    Results from last 7 days   Lab Units 08/10/21  0546 08/09/21  0834 08/08/21  1201   ALBUMIN g/dL 3.10* 3.40* 4.10   BILIRUBIN mg/dL 0.4 0.4 0.6   ALK PHOS U/L 62 63 68   AST (SGOT) U/L 38* 39* 33*   ALT (SGPT) U/L 34* 28 21     Results from last 7 days   Lab Units 08/10/21  0546 08/09/21  0834 08/08/21  1201   CALCIUM mg/dL 8.4* 8.2* 8.8   ALBUMIN g/dL 3.10* 3.40* 4.10   MAGNESIUM mg/dL  --   --  2.2     Results from last 7 days   Lab Units 08/08/21  1328 08/08/21  1201   PROCALCITONIN ng/mL  --  0.09   LACTATE mmol/L 1.4  --          Scheduled Meds  dexamethasone, 6 mg, Oral, Daily With Breakfast  enoxaparin, 40 mg, Subcutaneous, Q24H  ferrous sulfate, 325 mg, Oral, Daily With Breakfast  prenatal vitamin, 1 tablet, Oral, Daily  remdesivir, 100 mg, Intravenous, Q24H    Continuous Infusions  Pharmacy Consult - Remdesivir,     PRN Meds  •  acetaminophen  •  ibuprofen  •  ondansetron **OR** ondansetron  •  Pharmacy Consult - Remdesivir  •  potassium chloride **OR** potassium chloride **OR** potassium chloride  •  [COMPLETED] Insert peripheral IV **AND** sodium chloride    Pharmacy Consult - Remdesivir,     Diet  Diet Regular      COVID LABS:  Results From Last 14 Days   Lab Units 08/10/21  0546 08/08/21  1328 08/08/21  1201   PROBNP pg/mL  --   --  8.2   CRP mg/dL 10.70*  --   --    D DIMER QUANT MCGFEU/mL  --   --  0.91*   FERRITIN ng/mL 249.00*  --   --    LACTATE  mmol/L  --  1.4  --    PROCALCITONIN ng/mL  --   --  0.09   PROTIME Seconds  --   --  14.8*   INR   --   --  1.17*   TROPONIN T ng/mL  --   --  <0.010       Assessment/Plan     Active Hospital Problems    Diagnosis  POA   • **Acute hypoxemic respiratory failure due to COVID-19 (CMS/[facility]) [U07.1, J96.01]  Unknown   • Hypokalemia [E87.6]  Unknown   • Diarrhea [R19.7]  Unknown   • Anemia [D64.9]  Yes   • Pregnancy [Z34.90]  Not Applicable      Resolved Hospital Problems   No resolved problems to display.     25 y.o. female admitted with Covid, now hypoxia.    • COVID-19 pneumonia  o SpO2:  [92 %-96 %] 95 %  on  Flow (L/min):  [2-6] 4   o Was up to 6 L/min earlier today  o Monitor inflammatory markers  o Continue supportive care  o Decadron  o Remdesivir, monitor LFTs  o Diarrhea from Covid improved  • Hypokalemia from GI losses: Replacing  • Pregnancy, OB following  · Lovenox 40 mg SC daily for DVT prophylaxis.  · Full code.  · Discussed with patient, nursing staff and care team on multidisciplinary rounds.  · Anticipate discharge home timing yet to be determined.    Andrea Smith MD  [facility]  08/10/21  13:52 EDT    I wore protective equipment throughout this patient encounter including N95 mask/CAPR, gloves, gown,and protective eyewear.  Hand hygiene was performed before donning protective equipment and after removal when leaving the room.

## 2021-08-10 NOTE — PLAN OF CARE
Goal Outcome Evaluation:  Plan of Care Reviewed With: patient        Progress: declining  Outcome Summary: VS labile with activity. HR 130s with mild exertion and movement in the bed and oxygen saturation decreasing to the mid 80%. Increase pt O2 from 3L to 6L NC. Applied purwick to decrease activity. Will continue plan of care and monitoring at this time.

## 2021-08-11 LAB
ALBUMIN SERPL-MCNC: 2.9 G/DL (ref 3.5–5.2)
ALBUMIN/GLOB SERPL: 0.8 G/DL
ALP SERPL-CCNC: 71 U/L (ref 39–117)
ALT SERPL W P-5'-P-CCNC: 83 U/L (ref 1–33)
ANION GAP SERPL CALCULATED.3IONS-SCNC: 11.5 MMOL/L (ref 5–15)
AST SERPL-CCNC: 70 U/L (ref 1–32)
BASOPHILS # BLD AUTO: 0.01 10*3/MM3 (ref 0–0.2)
BASOPHILS NFR BLD AUTO: 0.3 % (ref 0–1.5)
BILIRUB CONJ SERPL-MCNC: <0.2 MG/DL (ref 0–0.3)
BILIRUB SERPL-MCNC: 0.4 MG/DL (ref 0–1.2)
BUN SERPL-MCNC: 5 MG/DL (ref 6–20)
BUN/CREAT SERPL: 12.2 (ref 7–25)
CALCIUM SPEC-SCNC: 8.7 MG/DL (ref 8.6–10.5)
CHLORIDE SERPL-SCNC: 104 MMOL/L (ref 98–107)
CO2 SERPL-SCNC: 19.5 MMOL/L (ref 22–29)
CREAT SERPL-MCNC: 0.41 MG/DL (ref 0.57–1)
CRP SERPL-MCNC: 9.26 MG/DL (ref 0–0.5)
DEPRECATED RDW RBC AUTO: 46.8 FL (ref 37–54)
EOSINOPHIL # BLD AUTO: 0 10*3/MM3 (ref 0–0.4)
EOSINOPHIL NFR BLD AUTO: 0 % (ref 0.3–6.2)
ERYTHROCYTE [DISTWIDTH] IN BLOOD BY AUTOMATED COUNT: 15.5 % (ref 12.3–15.4)
FERRITIN SERPL-MCNC: 292 NG/ML (ref 13–150)
GFR SERPL CREATININE-BSD FRML MDRD: >150 ML/MIN/1.73
GLOBULIN UR ELPH-MCNC: 3.5 GM/DL
GLUCOSE SERPL-MCNC: 83 MG/DL (ref 65–99)
HCT VFR BLD AUTO: 30.8 % (ref 34–46.6)
HGB BLD-MCNC: 10.1 G/DL (ref 12–15.9)
IMM GRANULOCYTES # BLD AUTO: 0.05 10*3/MM3 (ref 0–0.05)
IMM GRANULOCYTES NFR BLD AUTO: 1.3 % (ref 0–0.5)
LYMPHOCYTES # BLD AUTO: 1.08 10*3/MM3 (ref 0.7–3.1)
LYMPHOCYTES NFR BLD AUTO: 28.1 % (ref 19.6–45.3)
MCH RBC QN AUTO: 27.1 PG (ref 26.6–33)
MCHC RBC AUTO-ENTMCNC: 32.8 G/DL (ref 31.5–35.7)
MCV RBC AUTO: 82.6 FL (ref 79–97)
MONOCYTES # BLD AUTO: 0.41 10*3/MM3 (ref 0.1–0.9)
MONOCYTES NFR BLD AUTO: 10.6 % (ref 5–12)
NEUTROPHILS NFR BLD AUTO: 2.3 10*3/MM3 (ref 1.7–7)
NEUTROPHILS NFR BLD AUTO: 59.7 % (ref 42.7–76)
NRBC BLD AUTO-RTO: 0 /100 WBC (ref 0–0.2)
PLATELET # BLD AUTO: 281 10*3/MM3 (ref 140–450)
PMV BLD AUTO: 10.8 FL (ref 6–12)
POTASSIUM SERPL-SCNC: 3.9 MMOL/L (ref 3.5–5.2)
PROT SERPL-MCNC: 6.4 G/DL (ref 6–8.5)
RBC # BLD AUTO: 3.73 10*6/MM3 (ref 3.77–5.28)
SODIUM SERPL-SCNC: 135 MMOL/L (ref 136–145)
WBC # BLD AUTO: 3.85 10*3/MM3 (ref 3.4–10.8)

## 2021-08-11 PROCEDURE — 85025 COMPLETE CBC W/AUTO DIFF WBC: CPT | Performed by: HOSPITALIST

## 2021-08-11 PROCEDURE — 82248 BILIRUBIN DIRECT: CPT | Performed by: STUDENT IN AN ORGANIZED HEALTH CARE EDUCATION/TRAINING PROGRAM

## 2021-08-11 PROCEDURE — 63710000001 DEXAMETHASONE PER 0.25 MG: Performed by: HOSPITALIST

## 2021-08-11 PROCEDURE — 99232 SBSQ HOSP IP/OBS MODERATE 35: CPT | Performed by: STUDENT IN AN ORGANIZED HEALTH CARE EDUCATION/TRAINING PROGRAM

## 2021-08-11 PROCEDURE — 80053 COMPREHEN METABOLIC PANEL: CPT | Performed by: HOSPITALIST

## 2021-08-11 PROCEDURE — 86140 C-REACTIVE PROTEIN: CPT | Performed by: HOSPITALIST

## 2021-08-11 PROCEDURE — 82728 ASSAY OF FERRITIN: CPT | Performed by: HOSPITALIST

## 2021-08-11 PROCEDURE — 25010000002 ENOXAPARIN PER 10 MG: Performed by: HOSPITALIST

## 2021-08-11 RX ADMIN — Medication 1 TABLET: at 08:42

## 2021-08-11 RX ADMIN — REMDESIVIR 100 MG: 100 INJECTION, POWDER, LYOPHILIZED, FOR SOLUTION INTRAVENOUS at 13:59

## 2021-08-11 RX ADMIN — FERROUS SULFATE TAB 325 MG (65 MG ELEMENTAL FE) 325 MG: 325 (65 FE) TAB at 08:42

## 2021-08-11 RX ADMIN — DEXAMETHASONE 6 MG: 4 TABLET ORAL at 08:42

## 2021-08-11 RX ADMIN — SODIUM CHLORIDE, PRESERVATIVE FREE 10 ML: 5 INJECTION INTRAVENOUS at 08:42

## 2021-08-11 RX ADMIN — ENOXAPARIN SODIUM 40 MG: 40 INJECTION SUBCUTANEOUS at 08:41

## 2021-08-11 NOTE — PLAN OF CARE
Goal Outcome Evaluation:  Plan of Care Reviewed With: patient        Progress: improving  Outcome Summary: VSS. Pt had minimal complaints of dyspnea on exertion. No complaints of pain. Pt remains on 3L NC. Pt remains afebrile this shift. Will continue plan of care and monitoring.

## 2021-08-11 NOTE — PROGRESS NOTES
Dr. DEYVI Victoria    [address]    8/11/2021    Patient ID:  Name:  Regine Martins  MRN:  [id number]  1995  25 y.o.  female            CC/Reason for visit: COVID-19 pneumonia, pregnancy    Interval hx: Still very short of breath with minimal exertion but not at rest.  Has a dry cough    ROS: No chest pain, no abdominal pain    Vitals:  Vitals:    08/11/21 0026 08/11/21 0839 08/11/21 1301 08/11/21 1401   BP: 118/78 116/80  115/75   BP Location: Left arm Left arm  Left arm   Patient Position: Lying Lying  Sitting   Pulse: 74 88  93   Resp: 20 22  20   Temp: 97.5 °F (36.4 °C) 97.7 °F (36.5 °C)  98.1 °F (36.7 °C)   TempSrc: Oral Oral  Oral   SpO2: 94% 94% 95% 99%   Weight:       Height:               Body mass index is 35.63 kg/m².    Intake/Output Summary (Last 24 hours) at 8/11/2021 1716  Last data filed at 8/11/2021 1300  Gross per 24 hour   Intake --   Output 1600 ml   Net -1600 ml       Exam:  GEN:  No distress  Alert, oriented x 3.   LUNGS: Some crackles at the bases, overall diminished breath sounds bilat, no use of accessory muscles  CV:  Normal S1S2, without murmur, no edema  ABD:  Non tender, no enlarged liver or masses      Scheduled meds:  dexamethasone, 6 mg, Oral, Daily With Breakfast  enoxaparin, 40 mg, Subcutaneous, Q24H  ferrous sulfate, 325 mg, Oral, Daily With Breakfast  prenatal vitamin, 1 tablet, Oral, Daily  remdesivir, 100 mg, Intravenous, Q24H      IV meds:                      Pharmacy Consult - Remdesivir,         Data Review:   I reviewed the patient's medications and new clinical results.    COVID19   Date Value Ref Range Status   08/08/2021 Detected (C) Not Detected - Ref. Range Final         Lab Results   Component Value Date    CALCIUM 8.7 08/11/2021    MG 2.2 08/08/2021     Results from last 7 days   Lab Units 08/11/21  0641 08/10/21  2028 08/10/21  0546 08/09/21  0834 08/09/21  0834 08/08/21  1201 08/08/21  1201   SODIUM mmol/L 135*  --  135*  --  135*   < >  133*   POTASSIUM mmol/L 3.9 3.9 3.4*   < > 3.1*   < > 3.0*   CHLORIDE mmol/L 104  --  103  --  102   < > 97*   CO2 mmol/L 19.5*  --  20.9*  --  18.8*   < > 22.6   BUN mg/dL 5*  --  5*  --  3*   < > 4*   CREATININE mg/dL 0.41*  --  0.45*  --  0.46*   < > 0.59   CALCIUM mg/dL 8.7  --  8.4*  --  8.2*   < > 8.8   BILIRUBIN mg/dL 0.4  --  0.4  --  0.4   < > 0.6   ALK PHOS U/L 71  --  62  --  63   < > 68   ALT (SGPT) U/L 83*  --  34*  --  28   < > 21   AST (SGOT) U/L 70*  --  38*  --  39*   < > 33*   GLUCOSE mg/dL 83  --  85  --  80   < > 87   WBC 10*3/mm3 3.85  --  4.84  --  3.31*   < > 3.49   HEMOGLOBIN g/dL 10.1*  --  10.4*  --  10.3*   < > 11.6*   PLATELETS 10*3/mm3 281  --  259  --  204   < > 220   INR   --   --   --   --   --   --  1.17*   PROBNP pg/mL  --   --   --   --   --   --  8.2   PROCALCITONIN ng/mL  --   --   --   --   --   --  0.09    < > = values in this interval not displayed.         ASSESSMENT:     Acute hypoxemic respiratory failure due to COVID-19 (CMS/[facility])    Pregnancy    Anemia    Hypokalemia    Diarrhea        PLAN:  Continue supportive care with oxygen, remdesivir and dexamethasone.  Wean oxygen as tolerated.  Seems to slowly be improving somewhat.  Has very little reserve, any exertion causes moderate and rapid desaturation.        Johnny Victoria MD  8/11/2021

## 2021-08-11 NOTE — PROGRESS NOTES
LOS: 3 days     Chief Complaint: Covid pneumonia    Interval History: Patient reports she is doing okay this morning.  States she is just now gotten up to the bathroom which made her slightly short of breath.  Denies any worsening however it is on 3 L nasal cannula at this time.  Denies any fevers or chills.  Denies any nausea, vomiting, diarrhea.  States she still has somewhat decreased appetite.    Vital Signs  Temp:  [97.5 °F (36.4 °C)-98 °F (36.7 °C)] 97.7 °F (36.5 °C)  Heart Rate:  [74-97] 88  Resp:  [20-22] 22  BP: (116-133)/(69-80) 116/80    Physical Exam:  General: In no acute distress  HEENT: Oropharynx clear, moist mucous membranes  Cardiovascular: RRR, normal S1 and S2, no M/R/G  Respiratory: Clear to ascultation bilaterally, no wheezing  GI: Soft, NT/ND, + bowel sounds bilaterally, no masses  Skin: No rashes or lesions  Extremities: No edema, cyanosis  Access: Peripheral IV    Antibiotics:  Anti-Infectives (From admission, onward)    Ordered     Dose/Rate Route Frequency Start Stop    08/09/21 1003  remdesivir 100 mg in sodium chloride 0.9 % 270 mL IVPB (powder vial)     Ordering Provider: Jose Angel Silva DO    100 mg  over 60 Minutes Intravenous Every 24 Hours 08/10/21 1200 08/14/21 1159    08/09/21 1003  remdesivir 200 mg in sodium chloride 0.9 % 290 mL IVPB (powder vial)     Ordering Provider: Jose Angel Silva DO    200 mg  over 60 Minutes Intravenous Every 24 Hours 08/09/21 1200 08/09/21 1404    08/08/21 1245  cefTRIAXone (ROCEPHIN) IVPB 1 g     Ordering Provider: Maximilian Salinas MD    1 g  100 mL/hr over 30 Minutes Intravenous Once 08/08/21 1246 08/08/21 1410           Results Review:     I reviewed the patient's new clinical results.    Lab Results   Component Value Date    WBC 3.85 08/11/2021    HGB 10.1 (L) 08/11/2021    HCT 30.8 (L) 08/11/2021    MCV 82.6 08/11/2021     08/11/2021     Lab Results   Component Value Date    GLUCOSE 83 08/11/2021    BUN 5 (L) 08/11/2021     CREATININE 0.41 (L) 08/11/2021    EGFRIFAFRI >150 08/11/2021    BCR 12.2 08/11/2021    CO2 19.5 (L) 08/11/2021    CALCIUM 8.7 08/11/2021    ALBUMIN 2.90 (L) 08/11/2021    AST 70 (H) 08/11/2021    ALT 83 (H) 08/11/2021       Microbiology:  8/8 respiratory panel positive for Covid 19     8/8 blood cultures no growth     Radiology:  8/8 chest x-ray with bilateral pneumonia consistent with a viral pneumonia such as COVID-19.     8/8 CT angio with bilateral consolidation consistent with pneumonia.     8/8 normal duplex of lower extremity    Assessment/Plan   #Acute hypoxic respiratory failure in the setting of COVID-19 pneumonia in the setting of pregnancy     -Continue remdesivir 100 mg daily (for 5 days or until discharge) as well as dexamethasone 6 mg daily (for 10 days or until discharge).  -Continued risk-benefit discussion with the patient and due to her worsening respiratory status is still agreeable to continuing with dexamethasone and remdesivir therapy in the setting of pregnancy.  -trend LFTs and creatinine while on remdesivir.  Slight increase in LFTs today  -follow CRP and ferritin for monitoring of response.     ID will follow.

## 2021-08-11 NOTE — PLAN OF CARE
Goal Outcome Evaluation:  Plan of Care Reviewed With: patient        Progress: improving  Outcome Summary: Pt has had no complaints of any discomforts. Weaned down to 1L NC, and tolerating well. Some SOA noted still with exertion. Able to ambulated to BSC independently. Continues oral decadron and iv remdesivir. VSS, has been afebrile this shift. Will continue to monitor.

## 2021-08-11 NOTE — PROGRESS NOTES
Name: Regine Martins ADMIT: 2021   : 1995  PCP: Ynes Andres MD    MRN: [id number] LOS: 3 days   AGE/SEX: 25 y.o. female  ROOM: [facility]     Subjective   Subjective    She feels better today however still has dyspnea on exertion     Review of Systems   Constitutional: Negative for fever.   Respiratory: Positive for shortness of breath. Negative for cough.    Cardiovascular: Negative for chest pain.   Gastrointestinal: Negative for abdominal pain.      Objective   Objective   Vital Signs  Temp:  [97.5 °F (36.4 °C)-98.1 °F (36.7 °C)] 98.1 °F (36.7 °C)  Heart Rate:  [74-93] 93  Resp:  [20-22] 20  BP: (115-127)/(75-80) 115/75  SpO2:  [94 %-99 %] 99 %  on  Flow (L/min):  [1-3] 1;   Device (Oxygen Therapy): nasal cannula  Body mass index is 35.63 kg/m².    Physical Exam  Constitutional:       Appearance: Normal appearance.   HENT:      Head: Normocephalic and atraumatic.   Cardiovascular:      Rate and Rhythm: Normal rate and regular rhythm.   Pulmonary:      Effort: Pulmonary effort is normal. No respiratory distress.      Breath sounds: Decreased breath sounds present.   Abdominal:      General: Bowel sounds are normal. There is no distension.      Palpations: Abdomen is soft.      Tenderness: There is no abdominal tenderness. There is no guarding or rebound.   Musculoskeletal:         General: No swelling.      Right lower leg: No edema.      Left lower leg: No edema.   Skin:     General: Skin is warm and dry.   Neurological:      General: No focal deficit present.      Mental Status: She is alert and oriented to person, place, and time.   Psychiatric:         Mood and Affect: Mood normal.         Behavior: Behavior normal.     Results Review  I reviewed the patient's new clinical results.  Results from last 7 days   Lab Units 21  0641 08/10/21  0546 21  0834 21  1201   WBC 10*3/mm3 3.85 4.84 3.31* 3.49   HEMOGLOBIN g/dL 10.1* 10.4* 10.3* 11.6*   PLATELETS 10*3/mm3 281 259  204 220     Results from last 7 days   Lab Units 08/11/21  0641 08/10/21  2028 08/10/21  0546 08/09/21  0834 08/08/21  1201 08/08/21  1201   SODIUM mmol/L 135*  --  135* 135*  --  133*   POTASSIUM mmol/L 3.9 3.9 3.4* 3.1*   < > 3.0*   CHLORIDE mmol/L 104  --  103 102  --  97*   CO2 mmol/L 19.5*  --  20.9* 18.8*  --  22.6   BUN mg/dL 5*  --  5* 3*  --  4*   CREATININE mg/dL 0.41*  --  0.45* 0.46*  --  0.59   GLUCOSE mg/dL 83  --  85 80  --  87    < > = values in this interval not displayed.     Lab Results   Component Value Date    ANIONGAP 11.5 08/11/2021     Estimated Creatinine Clearance: 256.7 mL/min (A) (by C-G formula based on SCr of 0.41 mg/dL (L)).    Results from last 7 days   Lab Units 08/11/21  0641 08/10/21  0546 08/09/21  0834 08/08/21  1201   ALBUMIN g/dL 2.90* 3.10* 3.40* 4.10   BILIRUBIN mg/dL 0.4 0.4 0.4 0.6   ALK PHOS U/L 71 62 63 68   AST (SGOT) U/L 70* 38* 39* 33*   ALT (SGPT) U/L 83* 34* 28 21     Results from last 7 days   Lab Units 08/11/21  0641 08/10/21  0546 08/09/21  0834 08/08/21  1201   CALCIUM mg/dL 8.7 8.4* 8.2* 8.8   ALBUMIN g/dL 2.90* 3.10* 3.40* 4.10   MAGNESIUM mg/dL  --   --   --  2.2     Results from last 7 days   Lab Units 08/08/21  1328 08/08/21  1201   PROCALCITONIN ng/mL  --  0.09   LACTATE mmol/L 1.4  --          Scheduled Meds  dexamethasone, 6 mg, Oral, Daily With Breakfast  enoxaparin, 40 mg, Subcutaneous, Q24H  ferrous sulfate, 325 mg, Oral, Daily With Breakfast  prenatal vitamin, 1 tablet, Oral, Daily  remdesivir, 100 mg, Intravenous, Q24H    Continuous Infusions  Pharmacy Consult - Remdesivir,     PRN Meds  •  acetaminophen  •  ibuprofen  •  ondansetron **OR** ondansetron  •  Pharmacy Consult - Remdesivir  •  potassium chloride **OR** potassium chloride **OR** potassium chloride  •  [COMPLETED] Insert peripheral IV **AND** sodium chloride    Pharmacy Consult - Remdesivir,     Diet  Diet Regular      COVID LABS:  Results From Last 14 Days   Lab Units 08/11/21  0641  08/10/21  0546 08/08/21  1328 08/08/21  1201   PROBNP pg/mL  --   --   --  8.2   CRP mg/dL 9.26* 10.70*  --   --    D DIMER QUANT MCGFEU/mL  --   --   --  0.91*   FERRITIN ng/mL 292.00* 249.00*  --   --    LACTATE mmol/L  --   --  1.4  --    PROCALCITONIN ng/mL  --   --   --  0.09   PROTIME Seconds  --   --   --  14.8*   INR   --   --   --  1.17*   TROPONIN T ng/mL  --   --   --  <0.010       Assessment/Plan     Active Hospital Problems    Diagnosis  POA   • **Acute hypoxemic respiratory failure due to COVID-19 (CMS/[facility]) [U07.1, J96.01]  Unknown   • Hypokalemia [E87.6]  Unknown   • Diarrhea [R19.7]  Unknown   • Anemia [D64.9]  Yes   • Pregnancy [Z34.90]  Not Applicable      Resolved Hospital Problems   No resolved problems to display.     25 y.o. female 9 weeks pregnant, admitted with Covid, now hypoxia.    • COVID-19 pneumonia  o SpO2:  [94 %-99 %] 99 %  on  Flow (L/min):  [1-3] 1   o Requiring less oxygen  o Monitor inflammatory markers  o Continue supportive care  o Decadron  o Remdesivir, monitor LFTs (increased today)  o Diarrhea from Covid improved  • Hypokalemia from GI losses: Improved  • Pregnancy, OB following  · Lovenox 40 mg SC daily for DVT prophylaxis.  · Full code.  · Discussed with patient, nursing staff and care team on multidisciplinary rounds.  · Anticipate discharge home in 1-2 days.    Andrea Smith MD  [facility]  08/11/21  16:41 EDT    I wore protective equipment throughout this patient encounter including N95 mask/CAPR, gloves, gown,and protective eyewear.  Hand hygiene was performed before donning protective equipment and after removal when leaving the room.

## 2021-08-12 LAB
ALBUMIN SERPL-MCNC: 3.1 G/DL (ref 3.5–5.2)
ALBUMIN/GLOB SERPL: 0.9 G/DL
ALP SERPL-CCNC: 67 U/L (ref 39–117)
ALT SERPL W P-5'-P-CCNC: 124 U/L (ref 1–33)
ANION GAP SERPL CALCULATED.3IONS-SCNC: 10.7 MMOL/L (ref 5–15)
AST SERPL-CCNC: 79 U/L (ref 1–32)
BASOPHILS # BLD AUTO: 0 10*3/MM3 (ref 0–0.2)
BASOPHILS NFR BLD AUTO: 0 % (ref 0–1.5)
BILIRUB CONJ SERPL-MCNC: 0.2 MG/DL (ref 0–0.3)
BILIRUB SERPL-MCNC: 0.3 MG/DL (ref 0–1.2)
BUN SERPL-MCNC: 7 MG/DL (ref 6–20)
BUN/CREAT SERPL: 17.1 (ref 7–25)
CALCIUM SPEC-SCNC: 8.6 MG/DL (ref 8.6–10.5)
CHLORIDE SERPL-SCNC: 104 MMOL/L (ref 98–107)
CO2 SERPL-SCNC: 21.3 MMOL/L (ref 22–29)
CREAT SERPL-MCNC: 0.41 MG/DL (ref 0.57–1)
CRP SERPL-MCNC: 5.59 MG/DL (ref 0–0.5)
DEPRECATED RDW RBC AUTO: 46.8 FL (ref 37–54)
EOSINOPHIL # BLD AUTO: 0 10*3/MM3 (ref 0–0.4)
EOSINOPHIL NFR BLD AUTO: 0 % (ref 0.3–6.2)
ERYTHROCYTE [DISTWIDTH] IN BLOOD BY AUTOMATED COUNT: 15.2 % (ref 12.3–15.4)
FERRITIN SERPL-MCNC: 230 NG/ML (ref 13–150)
GFR SERPL CREATININE-BSD FRML MDRD: >150 ML/MIN/1.73
GLOBULIN UR ELPH-MCNC: 3.3 GM/DL
GLUCOSE SERPL-MCNC: 82 MG/DL (ref 65–99)
HCT VFR BLD AUTO: 33.1 % (ref 34–46.6)
HGB BLD-MCNC: 10.3 G/DL (ref 12–15.9)
IMM GRANULOCYTES # BLD AUTO: 0.13 10*3/MM3 (ref 0–0.05)
IMM GRANULOCYTES NFR BLD AUTO: 2.5 % (ref 0–0.5)
LYMPHOCYTES # BLD AUTO: 1.59 10*3/MM3 (ref 0.7–3.1)
LYMPHOCYTES NFR BLD AUTO: 30.8 % (ref 19.6–45.3)
MCH RBC QN AUTO: 26.3 PG (ref 26.6–33)
MCHC RBC AUTO-ENTMCNC: 31.1 G/DL (ref 31.5–35.7)
MCV RBC AUTO: 84.7 FL (ref 79–97)
MONOCYTES # BLD AUTO: 0.57 10*3/MM3 (ref 0.1–0.9)
MONOCYTES NFR BLD AUTO: 11 % (ref 5–12)
NEUTROPHILS NFR BLD AUTO: 2.87 10*3/MM3 (ref 1.7–7)
NEUTROPHILS NFR BLD AUTO: 55.7 % (ref 42.7–76)
NRBC BLD AUTO-RTO: 0 /100 WBC (ref 0–0.2)
PLATELET # BLD AUTO: 333 10*3/MM3 (ref 140–450)
PMV BLD AUTO: 10.6 FL (ref 6–12)
POTASSIUM SERPL-SCNC: 3.7 MMOL/L (ref 3.5–5.2)
PROT SERPL-MCNC: 6.4 G/DL (ref 6–8.5)
RBC # BLD AUTO: 3.91 10*6/MM3 (ref 3.77–5.28)
SODIUM SERPL-SCNC: 136 MMOL/L (ref 136–145)
WBC # BLD AUTO: 5.16 10*3/MM3 (ref 3.4–10.8)

## 2021-08-12 PROCEDURE — 86140 C-REACTIVE PROTEIN: CPT | Performed by: HOSPITALIST

## 2021-08-12 PROCEDURE — 82728 ASSAY OF FERRITIN: CPT | Performed by: HOSPITALIST

## 2021-08-12 PROCEDURE — 80053 COMPREHEN METABOLIC PANEL: CPT | Performed by: HOSPITALIST

## 2021-08-12 PROCEDURE — 63710000001 DEXAMETHASONE PER 0.25 MG: Performed by: HOSPITALIST

## 2021-08-12 PROCEDURE — 85025 COMPLETE CBC W/AUTO DIFF WBC: CPT | Performed by: HOSPITALIST

## 2021-08-12 PROCEDURE — 25010000002 ENOXAPARIN PER 10 MG: Performed by: HOSPITALIST

## 2021-08-12 PROCEDURE — 99232 SBSQ HOSP IP/OBS MODERATE 35: CPT | Performed by: STUDENT IN AN ORGANIZED HEALTH CARE EDUCATION/TRAINING PROGRAM

## 2021-08-12 PROCEDURE — 82248 BILIRUBIN DIRECT: CPT | Performed by: STUDENT IN AN ORGANIZED HEALTH CARE EDUCATION/TRAINING PROGRAM

## 2021-08-12 RX ADMIN — REMDESIVIR 100 MG: 100 INJECTION, POWDER, LYOPHILIZED, FOR SOLUTION INTRAVENOUS at 14:07

## 2021-08-12 RX ADMIN — FERROUS SULFATE TAB 325 MG (65 MG ELEMENTAL FE) 325 MG: 325 (65 FE) TAB at 09:18

## 2021-08-12 RX ADMIN — ENOXAPARIN SODIUM 40 MG: 40 INJECTION SUBCUTANEOUS at 09:18

## 2021-08-12 RX ADMIN — Medication 1 TABLET: at 09:18

## 2021-08-12 RX ADMIN — DEXAMETHASONE 6 MG: 4 TABLET ORAL at 09:18

## 2021-08-12 NOTE — CASE MANAGEMENT/SOCIAL WORK
Continued Stay Note  [facility]     Patient Name: Regine Martins  MRN: [id number]  Today's Date: 8/12/2021    Admit Date: 8/8/2021    Discharge Plan     Row Name 08/12/21 7329       Plan    Plan  return home with family    Patient/Family in Agreement with Plan  yes    Plan Comments  Spoke with patient by telephone due to pandemic isolation precautions.  She plans to return home at dc and is agreeable to using [employer] if home o2 is needed.  She states that her mother will transport her home at dc.  CCP will follow. Soheila Ventura RN        Discharge Codes    No documentation.       Expected Discharge Date and Time     Expected Discharge Date Expected Discharge Time    Aug 13, 2021             Soheila Ventura RN

## 2021-08-12 NOTE — PAYOR COMM NOTE
"Mai Martins (25 y.o. Female)     PLEASE SEE ATTACHED FOR CLINICAL REVIEW.     REF#[id number]    PLEASE CALL   OR  [phone] WITH INPT CONTINUED STAY AUTH.     THANK YOU    SONA VERMA LPN CCP    Date of Birth Social Security Number Address Home Phone MRN    1995  [address]   [place] [address]  [id number]    [employer] Status          None Single       Admission Date Admission Type Admitting Provider Attending Provider Department, Room/Bed    8/8/21 Emergency Stingl, MD Kartik Brooks Matthew D, MD [address], [address]/1    Discharge Date Discharge Disposition Discharge Destination                       Attending Provider: Adan Verdugo MD    Allergies: No Known Allergies    Isolation: Enh Drop/Con   Infection: COVID (confirmed) (08/08/21)   Code Status: CPR    Ht: 170.2 cm (67.01\")   Wt: 102 kg (224 lb 3.2 oz)    Admission Cmt: None   Principal Problem: Acute hypoxemic respiratory failure due to COVID-19 (CMS/[facility]) [U07.1,J96.01]                 Active Insurance as of 8/8/2021     Primary Coverage     Payor Plan Insurance Group Employer/Plan Group    [facility] [employer] [facility] [employer] [facility] PPO [id number]     Payor Plan Address Payor Plan Phone Number Payor Plan Fax Number Effective Dates    [address] [phone]  1/1/2021 - None Entered    [facility] [id number]       Subscriber Name Subscriber Birth Date Member ID       MAI MARTINS 1995 [id number]                 Emergency Contacts      (Rel.) Home Phone Work Phone Mobile Phone    Ranjana Martins (Mother) [phone] -- --              Oxygen Therapy (last 2 days)     Date/Time   SpO2   Device (Oxygen Therapy)   Flow (L/min)   Oxygen Concentration (%)   ETCO2 (mmHg)    08/12/21 0919   --   nasal cannula   1   --   --    08/12/21 0826   94   nasal cannula   2   --   --    08/12/21 0028   92   nasal cannula   1   --   --    08/12/21 0000 "   --   nasal cannula   1   --   --    08/11/21 2100   --   nasal cannula   1   --   --    08/11/21 2021   98   nasal cannula   1   --   --    08/11/21 1401   99   nasal cannula   1   --   --    08/11/21 1301   95   nasal cannula   2   --   --    08/11/21 0843   --   nasal cannula   3   --   --    08/11/21 0839   94   nasal cannula   3   --   --    08/11/21 0026   94   nasal cannula   3   --   --    08/10/21 2031   94   nasal cannula   3   --   --    08/10/21 1500   97   nasal cannula   3   --   --    08/10/21 1400   --   nasal cannula   4   --   --    08/10/21 1345   95   nasal cannula   4   --   --    08/10/21 1023   95   nasal cannula   5   --   --    08/10/21 0835   --   nasal cannula   6   --   --    08/10/21 0829   96   nasal cannula   6   --   --    08/10/21 0418   --   nasal cannula   6   --   --    08/10/21 0417   94   nasal cannula   3   --   --    08/10/21 0003   --   nasal cannula   3   --   --            Intake & Output (last 2 days)       08/10 0701 - 08/11 0700 08/11 0701 - 08/12 0700 08/12 0701 - 08/13 0700    P.O. 600 120     Total Intake(mL/kg) 600 (5.8) 120 (1.2)     Urine (mL/kg/hr) 1300 (0.5) 800 (0.3) 400 (0.8)    Total Output 1300 800 400    Net -700 -680 -400           Urine Unmeasured Occurrence 1 x 1 x         Lines, Drains & Airways    Active LDAs     Name:   Placement date:   Placement time:   Site:   Days:    Peripheral IV 08/08/21 1200 Left Antecubital   08/08/21    1200    Antecubital   3         Inactive LDAs     Name:   Placement date:   Placement time:   Removal date:   Removal time:   Site:   Days:    [REMOVED] External Urinary Catheter   08/10/21    0445    08/11/21 2000    --   1                  Medication Administration Report for Regine Martins as of 08/12/21 1200    Legend:    Given Hold Not Given Due Canceled Entry Other Actions    Time Time (Time) Time  Time-Action       Discontinued     Completed     Future     MAR Hold     Linked           Medications 08/10/21  08/11/21 08/12/21    acetaminophen (TYLENOL) tablet 650 mg  Dose: 650 mg  Freq: Every 4 Hours PRN Route: PO  PRN Reasons: Mild Pain ,Fever  Start: 08/09/21 0441    Admin Instructions:   Administer for temperature greater than or equal to 101.5.  Do not exceed 4 grams of acetaminophen in a 24 hr period. Max dose of 2gm for AST/ALT greater than 120 units/L      If given for pain, use the following pain scale:   Mild Pain = Pain Score of 1-3, CPOT 1-2  Moderate Pain = Pain Score of 4-6, CPOT 3-4  Severe Pain = Pain Score of 7-10, CPOT 5-8     0835               dexamethasone (DECADRON) tablet 6 mg  Dose: 6 mg  Freq: Daily With Breakfast Route: PO  Start: 08/09/21 0830    Admin Instructions:   Take with food.     0831 0842 0918             enoxaparin (LOVENOX) syringe 40 mg  Dose: 40 mg  Freq: Every 24 Hours Route: SC  Indications of Use: PROPHYLAXIS OF VENOUS THROMBOEMBOLISM  Start: 08/09/21 0900    Admin Instructions:   Give subcutaneous in abdomen only. Do not massage site after injection.     0830 0841 0918             ferrous sulfate tablet 325 mg  Dose: 325 mg  Freq: Daily With Breakfast Route: PO  Start: 08/09/21 0800    Admin Instructions:   Swallow whole. Do not crush, split, or chew. Take with food if GI upset occurs.     0831 0842          0918             ibuprofen (ADVIL,MOTRIN) tablet 600 mg  Dose: 600 mg  Freq: Every 8 Hours PRN Route: PO  PRN Reason: Mild Pain   Start: 08/08/21 1912    Admin Instructions:   If given for pain, use the following pain scale:  Mild Pain = Pain Score of 1-3, CPOT 1-2  Moderate Pain = Pain Score of 4-6, CPOT 3-4  Severe Pain = Pain Score of 7-10, CPOT 5-8           ondansetron (ZOFRAN) tablet 4 mg  Dose: 4 mg  Freq: Every 6 Hours PRN Route: PO  PRN Reasons: Nausea,Vomiting  Start: 08/08/21 1905         Or  ondansetron (ZOFRAN) injection 4 mg  Dose: 4 mg  Freq: Every 6 Hours PRN Route: IV  PRN Reasons: Nausea,Vomiting  Start:  08/08/21 1905          Pharmacy Consult - Remdesivir  Freq: Continuous PRN Route: XX  PRN Reason: Consult  Start: 08/09/21 1002   End: 08/14/21 1001          potassium chloride (K-DUR,KLOR-CON) ER tablet 40 mEq  Dose: 40 mEq  Freq: As Needed Route: PO  PRN Comment: Potassium Replacement.  See Admin Instructions  Start: 08/09/21 1154    Admin Instructions:   Potassium 3.1 or Less Give KCl 40 mEq q4h x3 Doses   Potassium 3.2 - 3.6 Give KCl 40 mEq q4h x2 Doses     Check Potassium 4 Hours After Last Dose Given   Check Magnesium if Potassium Level Remains Low After Replacement   DO NOT GIVE if CrCl is Less Than 30 mL/minute or Urine Output Less Than 30 mL/hr  Swallow whole; do not crush, split, or chew.     1024     1506             Or  potassium chloride (KLOR-CON) packet 40 mEq  Dose: 40 mEq  Freq: As Needed Route: PO  PRN Comment: potassium replacement, see admin instructions  Start: 08/09/21 1154    Admin Instructions:   Potassium 3.1 or Less Give KCl 40 mEq q4h x3 Doses   Potassium 3.2 - 3.6 Give KCl 40 mEq q4h x2 Doses     Check Potassium 4 Hours After Last Dose Given   Check Magnesium if Potassium Level Remains Low After Replacement   DO NOT GIVE if CrCl is Less Than 30 mL/minute or Urine Output Less Than 30 mL/hr                       Or  potassium chloride 10 mEq in 100 mL IVPB  Dose: 10 mEq  Freq: Every 1 Hour PRN Route: IV  PRN Comment: Potassium Replacement - See Admin Instructions  Start: 08/09/21 1154    Admin Instructions:   Peripheral or Central IV  Potassium 3.1 or Less Give KCl 10 mEq/100 mL NS IV q1h x6 Doses  Potassium 3.2 - 3.6 Give KCl 10 mEq/100 mL NS q1h x4 Doses    Check Potassium 4 Hours After Last Dose Given  Check Magnesium if Potassium Remains Low After Replacement  DO NOT GIVE if CrCl is Less Than 30 mL/minute or Urine Output Less Than 30 mL/hr.     Rates Greater Than 10 mEq/hr Require ECG Monitoring.  OUTPATIENT/NON-MONITORED UNITS: Potassium Chloride standard bolus infusion rate is a  maximum of 10 mEq/hr on unmonitored patients    MONITORED UNITS: Potassium Chloride standard bolus infusion rate is a maximum of 20 mEq/hr on ECG monitored patients ONLY                          prenatal vitamin tablet 1 tablet  Dose: 1 tablet  Freq: Daily Route: PO  Start: 08/08/21 2000    0831          0842          0918             remdesivir 200 mg in sodium chloride 0.9 % 290 mL IVPB (powder vial)  Dose: 200 mg  Freq: Every 24 Hours Route: IV  Start: 08/09/21 1200   End: 08/09/21 1404    Admin Instructions:   Ensure all of the drug is administered. Flush line with 30mL NS after administration.          Followed by  remdesivir 100 mg in sodium chloride 0.9 % 270 mL IVPB (powder vial)  Dose: 100 mg  Freq: Every 24 Hours Route: IV  Start: 08/10/21 1200   End: 08/14/21 1159    Admin Instructions:   Ensure all of the drug is administered. Flush line with 30mL NS after administration.     0999          1351          1200             sodium chloride 0.9 % flush 10 mL  Dose: 10 mL  Freq: As Needed Route: IV  PRN Reason: Line Care  Start: 08/08/21 1126    0831          0842             Completed Medications  Medications 08/10/21 08/11/21 08/12/21       acetaminophen (TYLENOL) tablet 1,000 mg  Dose: 1,000 mg  Freq: Once Route: PO  Start: 08/08/21 1412   End: 08/08/21 1412    Admin Instructions:   Do not exceed 4 grams of acetaminophen in a 24 hr period. Max dose of 2gm for AST/ALT greater than 120 units/L      If given for pain, use the following pain scale:   Mild Pain = Pain Score of 1-3, CPOT 1-2  Moderate Pain = Pain Score of 4-6, CPOT 3-4  Severe Pain = Pain Score of 7-10, CPOT 5-8           cefTRIAXone (ROCEPHIN) IVPB 1 g  Dose: 1 g  Freq: Once Route: IV  Indications of Use: PNEUMONIA  Start: 08/08/21 1246   End: 08/08/21 1410    Admin Instructions:   Caution: Look alike/sound alike drug alert. Refrigerate           iopamidol (ISOVUE-370) 76 % injection 100 mL  Dose: 100 mL  Freq: Once in Imaging Route: IV  Start:  08/08/21 1441   End: 08/08/21 1441          potassium chloride (K-DUR,KLOR-CON) ER tablet 40 mEq  Dose: 40 mEq  Freq: Once Route: PO  Start: 08/08/21 1340   End: 08/08/21 1410    Admin Instructions:   Swallow whole; do not crush, split, or chew.           sodium chloride 0.9 % bolus 1,000 mL  Dose: 1,000 mL  Freq: Once Route: IV  Start: 08/08/21 1417   End: 08/08/21 1607          sodium chloride 0.9 % bolus 1,000 mL  Dose: 1,000 mL  Freq: Once Route: IV  Start: 08/08/21 1210   End: 08/08/21 1410         Discontinued Medications  Medications 08/10/21 08/11/21 08/12/21       acetaminophen (TYLENOL) tablet 650 mg  Dose: 650 mg  Freq: Every 4 Hours PRN Route: PO  PRN Reason: Mild Pain   Start: 08/08/21 1905   End: 08/09/21 0442    Admin Instructions:   Do not exceed 4 grams of acetaminophen in a 24 hr period.    If given for pain, use the following pain scale:   Mild Pain = Pain Score of 1-3, CPOT 1-2  Moderate Pain = Pain Score of 4-6, CPOT 3-4  Severe Pain = Pain Score of 7-10, CPOT 5-8  Do not exceed 4 grams of acetaminophen in a 24 hr period. Max dose of 2gm for AST/ALT greater than 120 units/L      If given for pain, use the following pain scale:   Mild Pain = Pain Score of 1-3, CPOT 1-2  Moderate Pain = Pain Score of 4-6, CPOT 3-4  Severe Pain = Pain Score of 7-10, CPOT 5-8           melatonin tablet 3 mg  Dose: 3 mg  Freq: Nightly PRN Route: PO  PRN Reason: Sleep  Start: 08/08/21 1905   End: 08/08/21 1906          nitroglycerin (NITROSTAT) SL tablet 0.4 mg  Dose: 0.4 mg  Freq: Every 5 Minutes PRN Route: SL  PRN Reason: Chest Pain  PRN Comment: Only if SBP Greater Than 100  Start: 08/08/21 1905   End: 08/08/21 1906    Admin Instructions:   If Pain Unrelieved After 3 Doses Notify MD           Pharmacy to Dose enoxaparin (LOVENOX)  Freq: Continuous PRN Route: XX  PRN Reason: Consult  PRN Comment: pregnancy  Indications of Use: PROPHYLAXIS OF VENOUS THROMBOEMBOLISM  Start: 08/09/21 0740   End: 08/09/21 1324                  Operative/Procedure Notes (last 48 hours) (Notes from 08/10/21 1200 through 08/12/21 1200)    No notes of this type exist for this encounter.          Physician Progress Notes (last 48 hours) (Notes from 08/10/21 1200 through 08/12/21 1200)      Jose Angel Silva DO at 08/12/21 0850           LOS: 4 days     Chief Complaint: Cough pneumonia in the setting of pregnancy    Interval History: Patient states she is doing better this morning.  Has just awoken on my interview and states she has had minimal time to assess her self this morning but thinks she is feeling better.  Currently on 1 L of oxygen and reports no shortness of breath.  States her appetite is slightly better.  Denies any nausea, vomiting, diarrhea.  Denies any abdominal pain.  Denies any skin changes.    Vital Signs  Temp:  [97.1 °F (36.2 °C)-98.1 °F (36.7 °C)] 98 °F (36.7 °C)  Heart Rate:  [63-93] 69  Resp:  [20] 20  BP: (114-125)/(75-91) 115/78    Physical Exam:  General: In no acute distress  HEENT: Oropharynx clear, moist mucous membranes  Cardiovascular: RRR, normal S1 and S2, no M/R/G  Respiratory: Clear to ascultation bilaterally, no wheezing  GI: Soft, NT/ND, + bowel sounds bilaterally, no masses  Skin: No rashes or lesions  Extremities: No edema, cyanosis  Access: PIV    Antibiotics:  Anti-Infectives (From admission, onward)    Ordered     Dose/Rate Route Frequency Start Stop    08/09/21 1003  remdesivir 100 mg in sodium chloride 0.9 % 270 mL IVPB (powder vial)     Ordering Provider: Jose Angel Silva DO    100 mg  over 60 Minutes Intravenous Every 24 Hours 08/10/21 1200 08/14/21 1159    08/09/21 1003  remdesivir 200 mg in sodium chloride 0.9 % 290 mL IVPB (powder vial)     Ordering Provider: Jose Angel Silva DO    200 mg  over 60 Minutes Intravenous Every 24 Hours 08/09/21 1200 08/09/21 1404    08/08/21 1245  cefTRIAXone (ROCEPHIN) IVPB 1 g     Ordering Provider: Maximilian Salinas MD    1 g  100 mL/hr over 30 Minutes  Intravenous Once 08/08/21 1246 08/08/21 1410           Results Review:     I reviewed the patient's new clinical results.    Lab Results   Component Value Date    WBC 5.16 08/12/2021    HGB 10.3 (L) 08/12/2021    HCT 33.1 (L) 08/12/2021    MCV 84.7 08/12/2021     08/12/2021     Lab Results   Component Value Date    GLUCOSE 82 08/12/2021    BUN 7 08/12/2021    CREATININE 0.41 (L) 08/12/2021    EGFRIFAFRI >150 08/12/2021    BCR 17.1 08/12/2021    CO2 21.3 (L) 08/12/2021    CALCIUM 8.6 08/12/2021    ALBUMIN 3.10 (L) 08/12/2021    AST 79 (H) 08/12/2021     (H) 08/12/2021       Microbiology:  8/8 respiratory panel positive for Covid 19     8/8 blood cultures no growth     Radiology:  8/8 chest x-ray with bilateral pneumonia consistent with a viral pneumonia such as COVID-19.     8/8 CT angio with bilateral consolidation consistent with pneumonia.     8/8 normal duplex of lower extremity          Assessment/Plan      #Acute hypoxic respiratory failure in the setting of COVID-19 pneumonia in the setting of pregnancy     -Continue remdesivir 100 mg daily (for 5 days or until discharge) as well as dexamethasone 6 mg daily (for 10 days or until discharge).  Today is day 4 of remdesivir and 4 of dexamethasone  -Continued risk-benefit discussion with the patient and still agreeable to continuing with dexamethasone and remdesivir therapy in the setting of pregnancy.  -trend LFTs and creatinine while on remdesivir.  Slight increase in LFTs which can be in the setting of either Covid infection or remdesivir therapy however with 1 more day of therapy likely can continue at this point unless they become significantly more elevated.  -follow CRP and ferritin for monitoring of response.   Continues to improve    ID will follow.       Electronically signed by Jose Angel Silva DO at 08/12/21 0880     Arina Dean MD at 08/11/21 7027              Electronically signed by Arina Dean MD at 08/11/21 1065      Johnny Victoria MD at 08/11/21 1716          Dr. DEYVI Victoria    [address]    8/11/2021    Patient ID:  Name:  Regine Martins  MRN:  [id number]  1995  25 y.o.  female            CC/Reason for visit: COVID-19 pneumonia, pregnancy    Interval hx: Still very short of breath with minimal exertion but not at rest.  Has a dry cough    ROS: No chest pain, no abdominal pain    Vitals:  Vitals:    08/11/21 0026 08/11/21 0839 08/11/21 1301 08/11/21 1401   BP: 118/78 116/80  115/75   BP Location: Left arm Left arm  Left arm   Patient Position: Lying Lying  Sitting   Pulse: 74 88  93   Resp: 20 22  20   Temp: 97.5 °F (36.4 °C) 97.7 °F (36.5 °C)  98.1 °F (36.7 °C)   TempSrc: Oral Oral  Oral   SpO2: 94% 94% 95% 99%   Weight:       Height:               Body mass index is 35.63 kg/m².    Intake/Output Summary (Last 24 hours) at 8/11/2021 1716  Last data filed at 8/11/2021 1300  Gross per 24 hour   Intake --   Output 1600 ml   Net -1600 ml       Exam:  GEN:  No distress  Alert, oriented x 3.   LUNGS: Some crackles at the bases, overall diminished breath sounds bilat, no use of accessory muscles  CV:  Normal S1S2, without murmur, no edema  ABD:  Non tender, no enlarged liver or masses      Scheduled meds:  dexamethasone, 6 mg, Oral, Daily With Breakfast  enoxaparin, 40 mg, Subcutaneous, Q24H  ferrous sulfate, 325 mg, Oral, Daily With Breakfast  prenatal vitamin, 1 tablet, Oral, Daily  remdesivir, 100 mg, Intravenous, Q24H      IV meds:                      Pharmacy Consult - Remdesivir,         Data Review:   I reviewed the patient's medications and new clinical results.    COVID19   Date Value Ref Range Status   08/08/2021 Detected (C) Not Detected - Ref. Range Final         Lab Results   Component Value Date    CALCIUM 8.7 08/11/2021    MG 2.2 08/08/2021     Results from last 7 days   Lab Units 08/11/21  0641 08/10/21  2028 08/10/21  0546 08/09/21  0834 08/09/21  0834 08/08/21  1201 08/08/21  1201    SODIUM mmol/L 135*  --  135*  --  135*   < > 133*   POTASSIUM mmol/L 3.9 3.9 3.4*   < > 3.1*   < > 3.0*   CHLORIDE mmol/L 104  --  103  --  102   < > 97*   CO2 mmol/L 19.5*  --  20.9*  --  18.8*   < > 22.6   BUN mg/dL 5*  --  5*  --  3*   < > 4*   CREATININE mg/dL 0.41*  --  0.45*  --  0.46*   < > 0.59   CALCIUM mg/dL 8.7  --  8.4*  --  8.2*   < > 8.8   BILIRUBIN mg/dL 0.4  --  0.4  --  0.4   < > 0.6   ALK PHOS U/L 71  --  62  --  63   < > 68   ALT (SGPT) U/L 83*  --  34*  --  28   < > 21   AST (SGOT) U/L 70*  --  38*  --  39*   < > 33*   GLUCOSE mg/dL 83  --  85  --  80   < > 87   WBC 10*3/mm3 3.85  --  4.84  --  3.31*   < > 3.49   HEMOGLOBIN g/dL 10.1*  --  10.4*  --  10.3*   < > 11.6*   PLATELETS 10*3/mm3 281  --  259  --  204   < > 220   INR   --   --   --   --   --   --  1.17*   PROBNP pg/mL  --   --   --   --   --   --  8.2   PROCALCITONIN ng/mL  --   --   --   --   --   --  0.09    < > = values in this interval not displayed.         ASSESSMENT:     Acute hypoxemic respiratory failure due to COVID-19 (CMS/HCC)    Pregnancy    Anemia    Hypokalemia    Diarrhea        PLAN:  Continue supportive care with oxygen, remdesivir and dexamethasone.  Wean oxygen as tolerated.  Seems to slowly be improving somewhat.  Has very little reserve, any exertion causes moderate and rapid desaturation.        Johnny Victoria MD  2021    Electronically signed by Johnny Victoria MD at 21 [phone]     Andrea Smith MD at 21 [phone]              Name: Regine Martins ADMIT: 2021   : 1995  PCP: Ynes Andres MD    MRN: [id number] LOS: 3 days   AGE/SEX: 25 y.o. female  ROOM: [facility]     Subjective   Subjective    She feels better today however still has dyspnea on exertion     Review of Systems   Constitutional: Negative for fever.   Respiratory: Positive for shortness of breath. Negative for cough.    Cardiovascular: Negative for chest pain.   Gastrointestinal: Negative for abdominal pain.      Objective   Objective   Vital Signs  Temp:  [97.5 °F (36.4 °C)-98.1 °F (36.7 °C)] 98.1 °F (36.7 °C)  Heart Rate:  [74-93] 93  Resp:  [20-22] 20  BP: (115-127)/(75-80) 115/75  SpO2:  [94 %-99 %] 99 %  on  Flow (L/min):  [1-3] 1;   Device (Oxygen Therapy): nasal cannula  Body mass index is 35.63 kg/m².    Physical Exam  Constitutional:       Appearance: Normal appearance.   HENT:      Head: Normocephalic and atraumatic.   Cardiovascular:      Rate and Rhythm: Normal rate and regular rhythm.   Pulmonary:      Effort: Pulmonary effort is normal. No respiratory distress.      Breath sounds: Decreased breath sounds present.   Abdominal:      General: Bowel sounds are normal. There is no distension.      Palpations: Abdomen is soft.      Tenderness: There is no abdominal tenderness. There is no guarding or rebound.   Musculoskeletal:         General: No swelling.      Right lower leg: No edema.      Left lower leg: No edema.   Skin:     General: Skin is warm and dry.   Neurological:      General: No focal deficit present.      Mental Status: She is alert and oriented to person, place, and time.   Psychiatric:         Mood and Affect: Mood normal.         Behavior: Behavior normal.     Results Review  I reviewed the patient's new clinical results.  Results from last 7 days   Lab Units 08/11/21  0641 08/10/21  0546 08/09/21  0834 08/08/21  1201   WBC 10*3/mm3 3.85 4.84 3.31* 3.49   HEMOGLOBIN g/dL 10.1* 10.4* 10.3* 11.6*   PLATELETS 10*3/mm3 281 259 204 220     Results from last 7 days   Lab Units 08/11/21  0641 08/10/21  2028 08/10/21  0546 08/09/21  0834 08/08/21  1201 08/08/21  1201   SODIUM mmol/L 135*  --  135* 135*  --  133*   POTASSIUM mmol/L 3.9 3.9 3.4* 3.1*   < > 3.0*   CHLORIDE mmol/L 104  --  103 102  --  97*   CO2 mmol/L 19.5*  --  20.9* 18.8*  --  22.6   BUN mg/dL 5*  --  5* 3*  --  4*   CREATININE mg/dL 0.41*  --  0.45* 0.46*  --  0.59   GLUCOSE mg/dL 83  --  85 80  --  87    < > = values in this interval  not displayed.     Lab Results   Component Value Date    ANIONGAP 11.5 08/11/2021     Estimated Creatinine Clearance: 256.7 mL/min (A) (by C-G formula based on SCr of 0.41 mg/dL (L)).    Results from last 7 days   Lab Units 08/11/21  0641 08/10/21  0546 08/09/21  0834 08/08/21  1201   ALBUMIN g/dL 2.90* 3.10* 3.40* 4.10   BILIRUBIN mg/dL 0.4 0.4 0.4 0.6   ALK PHOS U/L 71 62 63 68   AST (SGOT) U/L 70* 38* 39* 33*   ALT (SGPT) U/L 83* 34* 28 21     Results from last 7 days   Lab Units 08/11/21  0641 08/10/21  0546 08/09/21  0834 08/08/21  1201   CALCIUM mg/dL 8.7 8.4* 8.2* 8.8   ALBUMIN g/dL 2.90* 3.10* 3.40* 4.10   MAGNESIUM mg/dL  --   --   --  2.2     Results from last 7 days   Lab Units 08/08/21  1328 08/08/21  1201   PROCALCITONIN ng/mL  --  0.09   LACTATE mmol/L 1.4  --          Scheduled Meds  dexamethasone, 6 mg, Oral, Daily With Breakfast  enoxaparin, 40 mg, Subcutaneous, Q24H  ferrous sulfate, 325 mg, Oral, Daily With Breakfast  prenatal vitamin, 1 tablet, Oral, Daily  remdesivir, 100 mg, Intravenous, Q24H    Continuous Infusions  Pharmacy Consult - Remdesivir,     PRN Meds  •  acetaminophen  •  ibuprofen  •  ondansetron **OR** ondansetron  •  Pharmacy Consult - Remdesivir  •  potassium chloride **OR** potassium chloride **OR** potassium chloride  •  [COMPLETED] Insert peripheral IV **AND** sodium chloride    Pharmacy Consult - Remdesivir,     Diet  Diet Regular     COVID LABS:  Results From Last 14 Days   Lab Units 08/11/21  0641 08/10/21  0546 08/08/21  1328 08/08/21  1201   PROBNP pg/mL  --   --   --  8.2   CRP mg/dL 9.26* 10.70*  --   --    D DIMER QUANT MCGFEU/mL  --   --   --  0.91*   FERRITIN ng/mL 292.00* 249.00*  --   --    LACTATE mmol/L  --   --  1.4  --    PROCALCITONIN ng/mL  --   --   --  0.09   PROTIME Seconds  --   --   --  14.8*   INR   --   --   --  1.17*   TROPONIN T ng/mL  --   --   --  <0.010       Assessment/Plan     Active Hospital Problems    Diagnosis  POA   • **Acute hypoxemic  respiratory failure due to COVID-19 (CMS/[facility]) [U07.1, J96.01]  Unknown   • Hypokalemia [E87.6]  Unknown   • Diarrhea [R19.7]  Unknown   • Anemia [D64.9]  Yes   • Pregnancy [Z34.90]  Not Applicable      Resolved Hospital Problems   No resolved problems to display.     25 y.o. female 9 weeks pregnant, admitted with Covid, now hypoxia.    • COVID-19 pneumonia  o SpO2:  [94 %-99 %] 99 %  on  Flow (L/min):  [1-3] 1   o Requiring less oxygen  o Monitor inflammatory markers  o Continue supportive care  o Decadron  o Remdesivir, monitor LFTs (increased today)  o Diarrhea from Covid improved  • Hypokalemia from GI losses: Improved  • Pregnancy, OB following  · Lovenox 40 mg SC daily for DVT prophylaxis.  · Full code.  · Discussed with patient, nursing staff and care team on multidisciplinary rounds.  · Anticipate discharge home in 1-2 days.    Andrea Smith MD  [facility]  08/11/21  16:41 EDT    I wore protective equipment throughout this patient encounter including N95 mask/CAPR, gloves, gown,and protective eyewear.  Hand hygiene was performed before donning protective equipment and after removal when leaving the room.     Electronically signed by Andrea Smith MD at 08/11/21 1190     Jose Angel Silva DO at 08/11/21 1107           LOS: 3 days     Chief Complaint: Covid pneumonia    Interval History: Patient reports she is doing okay this morning.  States she is just now gotten up to the bathroom which made her slightly short of breath.  Denies any worsening however it is on 3 L nasal cannula at this time.  Denies any fevers or chills.  Denies any nausea, vomiting, diarrhea.  States she still has somewhat decreased appetite.    Vital Signs  Temp:  [97.5 °F (36.4 °C)-98 °F (36.7 °C)] 97.7 °F (36.5 °C)  Heart Rate:  [74-97] 88  Resp:  [20-22] 22  BP: (116-133)/(69-80) 116/80    Physical Exam:  General: In no acute distress  HEENT: Oropharynx clear, moist mucous membranes  Cardiovascular:  RRR, normal S1 and S2, no M/R/G  Respiratory: Clear to ascultation bilaterally, no wheezing  GI: Soft, NT/ND, + bowel sounds bilaterally, no masses  Skin: No rashes or lesions  Extremities: No edema, cyanosis  Access: Peripheral IV    Antibiotics:  Anti-Infectives (From admission, onward)    Ordered     Dose/Rate Route Frequency Start Stop    08/09/21 1003  remdesivir 100 mg in sodium chloride 0.9 % 270 mL IVPB (powder vial)     Ordering Provider: Jose Angel Silva DO    100 mg  over 60 Minutes Intravenous Every 24 Hours 08/10/21 1200 08/14/21 1159    08/09/21 1003  remdesivir 200 mg in sodium chloride 0.9 % 290 mL IVPB (powder vial)     Ordering Provider: Jose Angel Silva DO    200 mg  over 60 Minutes Intravenous Every 24 Hours 08/09/21 1200 08/09/21 1404    08/08/21 1245  cefTRIAXone (ROCEPHIN) IVPB 1 g     Ordering Provider: Maximilian Salinas MD    1 g  100 mL/hr over 30 Minutes Intravenous Once 08/08/21 1246 08/08/21 1410           Results Review:     I reviewed the patient's new clinical results.    Lab Results   Component Value Date    WBC 3.85 08/11/2021    HGB 10.1 (L) 08/11/2021    HCT 30.8 (L) 08/11/2021    MCV 82.6 08/11/2021     08/11/2021     Lab Results   Component Value Date    GLUCOSE 83 08/11/2021    BUN 5 (L) 08/11/2021    CREATININE 0.41 (L) 08/11/2021    EGFRIFAFRI >150 08/11/2021    BCR 12.2 08/11/2021    CO2 19.5 (L) 08/11/2021    CALCIUM 8.7 08/11/2021    ALBUMIN 2.90 (L) 08/11/2021    AST 70 (H) 08/11/2021    ALT 83 (H) 08/11/2021       Microbiology:  8/8 respiratory panel positive for Covid 19     8/8 blood cultures no growth     Radiology:  8/8 chest x-ray with bilateral pneumonia consistent with a viral pneumonia such as COVID-19.     8/8 CT angio with bilateral consolidation consistent with pneumonia.     8/8 normal duplex of lower extremity    Assessment/Plan   #Acute hypoxic respiratory failure in the setting of COVID-19 pneumonia in the setting of  pregnancy     -Continue remdesivir 100 mg daily (for 5 days or until discharge) as well as dexamethasone 6 mg daily (for 10 days or until discharge).  -Continued risk-benefit discussion with the patient and due to her worsening respiratory status is still agreeable to continuing with dexamethasone and remdesivir therapy in the setting of pregnancy.  -trend LFTs and creatinine while on remdesivir.  Slight increase in LFTs today  -follow CRP and ferritin for monitoring of response.     ID will follow.     Electronically signed by Jose Angel Silva DO at 21 1111     Andrea Smith MD at 08/10/21 1352              Name: Regine Martins ADMIT: 2021   : 1995  PCP: Ynes Andres MD    MRN: [id number] LOS: 2 days   AGE/SEX: 25 y.o. female  ROOM: [facility]     Subjective   Subjective    Currently she feels fine but had shortness of breath earlier and dyspnea on exertion ambulating in her room.  Sitting in a chair. Diarrhea improved. Not much of an appetite.    Review of Systems   Constitutional: Negative for fever.   Respiratory: Positive for shortness of breath. Negative for cough.    Cardiovascular: Negative for chest pain.   Gastrointestinal: Negative for abdominal pain.     Objective   Objective   Vital Signs  Temp:  [98 °F (36.7 °C)-101.5 °F (38.6 °C)] 98 °F (36.7 °C)  Heart Rate:  [] 97  Resp:  [20-24] 20  BP: (116-133)/(69-90) 133/69  SpO2:  [92 %-96 %] 95 %  on  Flow (L/min):  [2-6] 4;   Device (Oxygen Therapy): nasal cannula  Body mass index is 35.63 kg/m².    Physical Exam  Constitutional:       Appearance: Normal appearance.   HENT:      Head: Normocephalic and atraumatic.   Cardiovascular:      Rate and Rhythm: Normal rate and regular rhythm.   Pulmonary:      Effort: Pulmonary effort is normal. No respiratory distress.      Breath sounds: Decreased breath sounds present.   Abdominal:      General: Bowel sounds are normal. There is no distension.      Palpations: Abdomen  is soft.      Tenderness: There is no abdominal tenderness. There is no guarding or rebound.   Musculoskeletal:         General: No swelling.      Right lower leg: No edema.      Left lower leg: No edema.   Skin:     General: Skin is warm and dry.   Neurological:      General: No focal deficit present.      Mental Status: She is alert and oriented to person, place, and time.   Psychiatric:         Mood and Affect: Mood normal.         Behavior: Behavior normal.     Results Review  I reviewed the patient's new clinical results.  Results from last 7 days   Lab Units 08/10/21  0546 08/09/21  0834 08/08/21  1201   WBC 10*3/mm3 4.84 3.31* 3.49   HEMOGLOBIN g/dL 10.4* 10.3* 11.6*   PLATELETS 10*3/mm3 259 204 220     Results from last 7 days   Lab Units 08/10/21  0546 08/09/21  0834 08/08/21  1201   SODIUM mmol/L 135* 135* 133*   POTASSIUM mmol/L 3.4* 3.1* 3.0*   CHLORIDE mmol/L 103 102 97*   CO2 mmol/L 20.9* 18.8* 22.6   BUN mg/dL 5* 3* 4*   CREATININE mg/dL 0.45* 0.46* 0.59   GLUCOSE mg/dL 85 80 87     Lab Results   Component Value Date    ANIONGAP 11.1 08/10/2021     Estimated Creatinine Clearance: 233.9 mL/min (A) (by C-G formula based on SCr of 0.45 mg/dL (L)).    Results from last 7 days   Lab Units 08/10/21  0546 08/09/21  0834 08/08/21  1201   ALBUMIN g/dL 3.10* 3.40* 4.10   BILIRUBIN mg/dL 0.4 0.4 0.6   ALK PHOS U/L 62 63 68   AST (SGOT) U/L 38* 39* 33*   ALT (SGPT) U/L 34* 28 21     Results from last 7 days   Lab Units 08/10/21  0546 08/09/21  0834 08/08/21  1201   CALCIUM mg/dL 8.4* 8.2* 8.8   ALBUMIN g/dL 3.10* 3.40* 4.10   MAGNESIUM mg/dL  --   --  2.2     Results from last 7 days   Lab Units 08/08/21  1328 08/08/21  1201   PROCALCITONIN ng/mL  --  0.09   LACTATE mmol/L 1.4  --          Scheduled Meds  dexamethasone, 6 mg, Oral, Daily With Breakfast  enoxaparin, 40 mg, Subcutaneous, Q24H  ferrous sulfate, 325 mg, Oral, Daily With Breakfast  prenatal vitamin, 1 tablet, Oral, Daily  remdesivir, 100 mg,  Intravenous, Q24H    Continuous Infusions  Pharmacy Consult - Remdesivir,     PRN Meds  •  acetaminophen  •  ibuprofen  •  ondansetron **OR** ondansetron  •  Pharmacy Consult - Remdesivir  •  potassium chloride **OR** potassium chloride **OR** potassium chloride  •  [COMPLETED] Insert peripheral IV **AND** sodium chloride    Pharmacy Consult - Remdesivir,     Diet  Diet Regular     COVID LABS:  Results From Last 14 Days   Lab Units 08/10/21  0546 08/08/21  1328 08/08/21  1201   PROBNP pg/mL  --   --  8.2   CRP mg/dL 10.70*  --   --    D DIMER QUANT MCGFEU/mL  --   --  0.91*   FERRITIN ng/mL 249.00*  --   --    LACTATE mmol/L  --  1.4  --    PROCALCITONIN ng/mL  --   --  0.09   PROTIME Seconds  --   --  14.8*   INR   --   --  1.17*   TROPONIN T ng/mL  --   --  <0.010       Assessment/Plan     Active Hospital Problems    Diagnosis  POA   • **Acute hypoxemic respiratory failure due to COVID-19 (CMS/[facility]) [U07.1, J96.01]  Unknown   • Hypokalemia [E87.6]  Unknown   • Diarrhea [R19.7]  Unknown   • Anemia [D64.9]  Yes   • Pregnancy [Z34.90]  Not Applicable      Resolved Hospital Problems   No resolved problems to display.     25 y.o. female admitted with Covid, now hypoxia.    • COVID-19 pneumonia  o SpO2:  [92 %-96 %] 95 %  on  Flow (L/min):  [2-6] 4   o Was up to 6 L/min earlier today  o Monitor inflammatory markers  o Continue supportive care  o Decadron  o Remdesivir, monitor LFTs  o Diarrhea from Covid improved  • Hypokalemia from GI losses: Replacing  • Pregnancy, OB following  · Lovenox 40 mg SC daily for DVT prophylaxis.  · Full code.  · Discussed with patient, nursing staff and care team on multidisciplinary rounds.  · Anticipate discharge home timing yet to be determined.    Andrea Smith MD  [facility]  08/10/21  13:52 EDT    I wore protective equipment throughout this patient encounter including N95 mask/CAPR, gloves, gown,and protective eyewear.  Hand hygiene was performed before  donning protective equipment and after removal when leaving the room.     Electronically signed by Andrea Smith MD at 08/10/21 1357          Consult Notes (last 48 hours) (Notes from 08/10/21 1200 through 08/12/21 1200)      Johnny Victoria MD at 08/10/21 1930      Consult Orders    1. Inpatient Pulmonology Consult [[id number]] ordered by Andrea Smith MD at 08/10/21 0915               Group: [facility]         CONSULT NOTE    Patient Identification:  Regine Martins  25 y.o.  female  1995  [id number]            Requesting physician: Dr. Andrea Smith    Reason for Consultation: COVID-19 pneumonia    CC: Cough and shortness of breath    History of Present Illness:  25-year-old -American female who presents for shortness of breath and cough.  She is 9 weeks pregnant.  She has had symptoms for about 7 days of progressively worsening shortness of breath, chills, muscle aches, malaise and anorexia.  She was negative last Monday when she tested for COVID-19.  Her symptoms have continued to progress and worsen to the point where she presented on Wednesday last week for repeat testing and was found to be positive.  She becomes short of breath when she walks but not at rest.  She does have a cough but not producing much sputum.  She has not been vaccinated.  I reviewed H&P dictated by the hospitalist as well as consultation by Dr. Silva, infectious diseases.      Review of Systems   Constitutional: Positive for chills, diaphoresis and fatigue. Negative for fever.   HENT: Negative for ear discharge and sore throat.    Eyes: Negative for pain and visual disturbance.   Respiratory: Positive for cough and shortness of breath.    Cardiovascular: Negative for chest pain and leg swelling.   Gastrointestinal: Negative for abdominal pain and diarrhea.   Endocrine: Negative for cold intolerance and polyuria.   Genitourinary: Negative for dysuria and hematuria.   Musculoskeletal: Positive for  "myalgias. Negative for joint swelling.   Skin: Negative for rash and wound.   Neurological: Negative for speech difficulty and numbness.   Hematological: Negative for adenopathy. Does not bruise/bleed easily.   Psychiatric/Behavioral: Negative for agitation and confusion.       Past Medical History:  Past Medical History:   Diagnosis Date   • Abnormal Pap smear of cervix    • Anemia    • Migraine        Past Surgical History:  Past Surgical History:   Procedure Laterality Date   • FOOT SURGERY      Per pt, to remove bones; had 2 procedures        Home Meds:  Medications Prior to Admission   Medication Sig Dispense Refill Last Dose   • ferrous sulfate 325 (65 FE) MG tablet Take 325 mg by mouth Daily With Breakfast.   Past Week at Unknown time   • ibuprofen (ADVIL,MOTRIN) 600 MG tablet Take 1 tablet by mouth Every 8 (Eight) Hours As Needed for Mild Pain . 30 tablet 3 Past Week at Unknown time   • Prenatal Vit-Fe Fumarate-FA (PRENATAL, CLASSIC, VITAMIN) 28-0.8 MG tablet tablet Take 1 tablet by mouth Daily.   Past Week at Unknown time   • valACYclovir (VALTREX) 500 MG tablet Take 500 mg by mouth 2 (Two) Times a Day.   Past Week at Unknown time       Allergies:  No Known Allergies    Social History:   Social History     Socioeconomic History   • Marital status: Single     Spouse name: Not on file   • Number of children: Not on file   • Years of education: Not on file   • Highest education level: Not on file   Tobacco Use   • Smoking status: Never Smoker   • Smokeless tobacco: Never Used   Substance and Sexual Activity   • Alcohol use: No   • Drug use: No   • Sexual activity: Defer       Family History:  History reviewed. No pertinent family history.    Physical Exam:  /69 (BP Location: Left arm, Patient Position: Sitting)   Pulse 97   Temp 98 °F (36.7 °C) (Oral)   Resp 20   Ht 170.2 cm (67\")   Wt 103 kg (227 lb 8.2 oz)   SpO2 97%   BMI 35.63 kg/m²  Body mass index is 35.63 kg/m². 97% 103 kg (227 lb 8.2 " oz)  Physical Exam  HENT:      Right Ear: External ear normal.      Left Ear: External ear normal.      Nose: Nose normal.   Eyes:      Conjunctiva/sclera: Conjunctivae normal.      Pupils: Pupils are equal, round, and reactive to light.   Neck:      Thyroid: No thyromegaly.      Vascular: No JVD.      Trachea: No tracheal deviation.   Cardiovascular:      Rate and Rhythm: Normal rate and regular rhythm.      Heart sounds: Normal heart sounds. No murmur heard.     Pulmonary:      Effort: Pulmonary effort is normal.      Breath sounds: Normal breath sounds.   Abdominal:      Palpations: Abdomen is soft.      Tenderness: There is no abdominal tenderness.      Comments: Difficult to tell if there is a gravid uterus to palpation   Musculoskeletal:         General: No deformity. Normal range of motion.      Cervical back: Neck supple. No rigidity.   Skin:     General: Skin is warm.      Findings: No rash.      Comments: No palpable nodules   Neurological:      General: No focal deficit present.      Mental Status: She is alert and oriented to person, place, and time.      Cranial Nerves: No cranial nerve deficit.      Motor: No weakness.   Psychiatric:         Mood and Affect: Mood normal.         Thought Content: Thought content normal.         LABS:  COVID19   Date Value Ref Range Status   08/08/2021 Detected (C) Not Detected - Ref. Range Final       Lab Results   Component Value Date    CALCIUM 8.4 (L) 08/10/2021     Results from last 7 days   Lab Units 08/10/21  0546 08/09/21  0834 08/09/21  0834 08/08/21  1201 08/08/21  1201   MAGNESIUM mg/dL  --   --   --   --  2.2   SODIUM mmol/L 135*  --  135*  --  133*   POTASSIUM mmol/L 3.4*  --  3.1*  --  3.0*   CHLORIDE mmol/L 103  --  102  --  97*   CO2 mmol/L 20.9*  --  18.8*  --  22.6   BUN mg/dL 5*  --  3*  --  4*   CREATININE mg/dL 0.45*  --  0.46*  --  0.59   GLUCOSE mg/dL 85   < > 80   < > 87   CALCIUM mg/dL 8.4*  --  8.2*  --  8.8   WBC 10*3/mm3 4.84  --  3.31*  --   3.49   HEMOGLOBIN g/dL 10.4*  --  10.3*  --  11.6*   PLATELETS 10*3/mm3 259  --  204  --  220   ALT (SGPT) U/L 34*  --  28  --  21   AST (SGOT) U/L 38*  --  39*  --  33*   PROBNP pg/mL  --   --   --   --  8.2   PROCALCITONIN ng/mL  --   --   --   --  0.09    < > = values in this interval not displayed.     Lab Results   Component Value Date    TROPONINT <0.010 08/08/2021     Results from last 7 days   Lab Units 08/08/21  1201   TROPONIN T ng/mL <0.010     Results from last 7 days   Lab Units 08/08/21  1328   BLOODCX  No growth at 2 days  No growth at 2 days     Results from last 7 days   Lab Units 08/08/21  1328 08/08/21  1201   PROCALCITONIN ng/mL  --  0.09   LACTATE mmol/L 1.4  --          Results from last 7 days   Lab Units 08/08/21  1201   ADENOVIRUS DETECTION BY PCR  Not Detected   CORONAVIRUS 229E  Not Detected   CORONAVIRUS HKU1  Not Detected   CORONAVIRUS NL63  Not Detected   CORONAVIRUS OC43  Not Detected   HUMAN METAPNEUMOVIRUS  Not Detected   HUMAN RHINOVIRUS/ENTEROVIRUS  Not Detected   INFLUENZA B PCR  Not Detected   PARAINFLUENZA 1  Not Detected   PARAINFLUENZA VIRUS 2  Not Detected   PARAINFLUENZA VIRUS 3  Not Detected   PARAINFLUENZA VIRUS 4  Not Detected   BORDETELLA PERTUSSIS PCR  Not Detected   BORDETELLA PARAPERTUSSIS PCR  Not Detected   CHLAMYDOPHILA PNEUMONIAE PCR  Not Detected   MYCOPLAMA PNEUMO PCR  Not Detected   RSV, PCR  Not Detected     Results from last 7 days   Lab Units 08/08/21  1201   INR  1.17*     Results from last 7 days   Lab Units 08/08/21  1328   BLOODCX  No growth at 2 days  No growth at 2 days     No results found for: TSH  Estimated Creatinine Clearance: 233.9 mL/min (A) (by C-G formula based on SCr of 0.45 mg/dL (L)).  Results from last 7 days   Lab Units 08/08/21  1231   NITRITE UA  Negative   WBC UA /HPF None Seen   BACTERIA UA /HPF 1+*   SQUAM EPITHEL UA /HPF 3-6*        Imaging: I personally visualized the images of CT of the chest showing bilateral pulmonary  infiltrates.      Assessment:  Acute hypoxemic respiratory failure due to COVID-19 (CMS/HCC)  COVID-19 pneumonia  Hypokalemia  Leukopenia  9 weeks pregnant    Recommendations:  CT scan images reviewed and patient evidently has pneumonia.  Tested positive for COVID-19 infection.  Recommend supplemental oxygen and IV daily Decadron, 6 mg.  Would also recommend remdesivir dosing.  Monitor renal and hepatic function daily.  Hopefully this will be a self-limited disease without any long-term significant sequela I.    Patient was placed in face mask upon entering room and kept mask on throughout our encounter. I wore full protective equipment throughout this patient encounter including a face mask, gown and gloves. Hand hygiene was performed before donning protective equipment and after removal when leaving the room.    Johnny Victoria MD  8/10/2021  19:30 EDT      Much of this encounter note is an electronic transcription/translation of spoken language to printed text using Dragon Software.    Electronically signed by Johnny Victoria MD at 08/10/21 1935     Deborah Baker MD at 08/10/21 1410      Consult Orders    1. Inpatient Obstetrics / Gynecology Consult [[id number]] ordered by Andrea Smith MD at 08/10/21 0857               [facility]  Obstetric History and Physical    Patient Name: Regine Martins  :  1995  MRN:  [id number]        Chief Complaint   Patient presents with   • Shortness of Breath       Subjective     Patient is a 25 y.o. female  currently at 9w5d, who presented to [facility] ED with repiratory complaints and found to has Pneumonia with Covid 19. Denies vaginal bleeding or cramping.        Past Medical History: Past Medical History:   Diagnosis Date   • Abnormal Pap smear of cervix    • Anemia    • Migraine       Past Surgical History Past Surgical History:   Procedure Laterality Date   • FOOT SURGERY      Per pt, to remove bones; had 2 procedures      Family History: History  reviewed. No pertinent family history.   Social History:  reports that she has never smoked. She has never used smokeless tobacco.   reports no history of alcohol use.   reports no history of drug use.    Allergies:     Patient has no known allergies.     Past OB History:       OB History    Para Term  AB Living   2 1 1 0 0 1   SAB TAB Ectopic Molar Multiple Live Births   0 0 0 0 0 1      # Outcome Date GA Lbr Issac/2nd Weight Sex Delivery Anes PTL Lv   2 Current            1 Term 18 40w0d 16:35 / 01:44 3260 g (7 lb 3 oz) F Vag-Spont EPI N PORFIRIO      Birth Comments: Scale 5      Name: NADIA CHAN      Apgar1: 8  Apgar5: 9         Objective       Vital Signs Range for the last 24 hours  Temperature: Temp:  [98 °F (36.7 °C)-101.5 °F (38.6 °C)] 98 °F (36.7 °C)   Temp Source: Temp src: Oral   BP: BP: (116-133)/(69-90) 133/69   Pulse: Heart Rate:  [] 97   Respirations: Resp:  [20-24] 20             Physical Exam:        General :    Alert and cooperative in NAD   Abdomen:  Gravid, non-tender, no masses   Vaginal exam: deferred      A/P:  IUP at 9w5d admitted with Covid pneumonia  Thank you for the consult. Agree with current management of Covid complications. Nothing to add from obstetrics perspective. Please feel free to call with any obstetrical questions or changes. We are happy to come back to see patient if needed.      Problem List Items Addressed This Visit        Other    * (Principal) Acute hypoxemic respiratory failure due to COVID-19 (CMS/[facility]) - Primary      Other Visit Diagnoses     9 weeks gestation of pregnancy                      Deborah Baker MD  8/10/2021  14:11 EDT               Electronically signed by Deborah Baker MD at 08/10/21 9698

## 2021-08-12 NOTE — PROGRESS NOTES
"Dr. DEYVI Victoria    [address]    8/12/2021    Patient ID:  Name:  Regine Martins  MRN:  [id number]  1995  25 y.o.  female            CC/Reason for visit: COVID-19 pneumonia, acute respiratory failure    Interval hx: Slowly requiring less oxygen.  Still has a cough and is very short of breath with minimal exertion but not at rest    ROS: No abdominal pain, no chest pain    Vitals:  Vitals:    08/12/21 0826 08/12/21 0932 08/12/21 1217 08/12/21 1604   BP: 115/78  106/79 109/76   BP Location: Right arm  Left arm Left arm   Patient Position:   Sitting Sitting   Pulse: 69  79 84   Resp: 20  20 20   Temp: 98 °F (36.7 °C)  97.7 °F (36.5 °C) 97.9 °F (36.6 °C)   TempSrc: Oral  Oral Oral   SpO2: 94%  95% 95%   Weight:  102 kg (224 lb 3.2 oz)     Height:  170.2 cm (67.01\")             Body mass index is 35.11 kg/m².    Intake/Output Summary (Last 24 hours) at 8/12/2021 1803  Last data filed at 8/12/2021 1217  Gross per 24 hour   Intake 240 ml   Output 900 ml   Net -660 ml       Exam:  GEN:  No distress  Alert, oriented x 3.   LUNGS: Diminished but clear breath sounds bilat, no use of accessory muscles  CV:  Normal S1S2, without murmur, no edema  ABD:  Non tender, overweight, difficult to tell if there is gravid abdomen      Scheduled meds:  dexamethasone, 6 mg, Oral, Daily With Breakfast  enoxaparin, 40 mg, Subcutaneous, Q24H  ferrous sulfate, 325 mg, Oral, Daily With Breakfast  prenatal vitamin, 1 tablet, Oral, Daily  remdesivir, 100 mg, Intravenous, Q24H      IV meds:                      Pharmacy Consult - Remdesivir,         Data Review:   I reviewed the patient's medications and new clinical results.    COVID19   Date Value Ref Range Status   08/08/2021 Detected (C) Not Detected - Ref. Range Final         Lab Results   Component Value Date    CALCIUM 8.6 08/12/2021    MG 2.2 08/08/2021     Results from last 7 days   Lab Units 08/12/21  0452 08/11/21  0641 08/10/21  2028 08/10/21  0546 " 08/10/21  0546 08/09/21  0834 08/08/21  1201   SODIUM mmol/L 136 135*  --   --  135*   < > 133*   POTASSIUM mmol/L 3.7 3.9 3.9   < > 3.4*   < > 3.0*   CHLORIDE mmol/L 104 104  --   --  103   < > 97*   CO2 mmol/L 21.3* 19.5*  --   --  20.9*   < > 22.6   BUN mg/dL 7 5*  --   --  5*   < > 4*   CREATININE mg/dL 0.41* 0.41*  --   --  0.45*   < > 0.59   CALCIUM mg/dL 8.6 8.7  --   --  8.4*   < > 8.8   BILIRUBIN mg/dL 0.3 0.4  --   --  0.4   < > 0.6   ALK PHOS U/L 67 71  --   --  62   < > 68   ALT (SGPT) U/L 124* 83*  --   --  34*   < > 21   AST (SGOT) U/L 79* 70*  --   --  38*   < > 33*   GLUCOSE mg/dL 82 83  --   --  85   < > 87   WBC 10*3/mm3 5.16 3.85  --   --  4.84   < > 3.49   HEMOGLOBIN g/dL 10.3* 10.1*  --   --  10.4*   < > 11.6*   PLATELETS 10*3/mm3 333 281  --   --  259   < > 220   INR   --   --   --   --   --   --  1.17*   PROBNP pg/mL  --   --   --   --   --   --  8.2   PROCALCITONIN ng/mL  --   --   --   --   --   --  0.09    < > = values in this interval not displayed.       ASSESSMENT:     Acute hypoxemic respiratory failure due to COVID-19 (CMS/HCC)    Pregnancy    Anemia    Hypokalemia    Diarrhea        PLAN:  Complete Remdesevir.  Continue supplemental oxygen.  Seems to be weaning on oxygen, which is a good sign.  Continue dexamethasone.  Hopefully she can go home tomorrow.        Johnny Victoria MD  8/12/2021

## 2021-08-12 NOTE — PLAN OF CARE
Goal Outcome Evaluation:               VSS. Pt on room air most of the afternoon. Up to chair. Day 4 of Remdesivir. Possible discharge in 1 - 2 days.   When patient was cleaning up her heart rate was up to 1-teens - 130s; she did take a few breaks during that time.

## 2021-08-12 NOTE — PROGRESS NOTES
LOS: 4 days     Chief Complaint: Cough pneumonia in the setting of pregnancy    Interval History: Patient states she is doing better this morning.  Has just awoken on my interview and states she has had minimal time to assess her self this morning but thinks she is feeling better.  Currently on 1 L of oxygen and reports no shortness of breath.  States her appetite is slightly better.  Denies any nausea, vomiting, diarrhea.  Denies any abdominal pain.  Denies any skin changes.    Vital Signs  Temp:  [97.1 °F (36.2 °C)-98.1 °F (36.7 °C)] 98 °F (36.7 °C)  Heart Rate:  [63-93] 69  Resp:  [20] 20  BP: (114-125)/(75-91) 115/78    Physical Exam:  General: In no acute distress  HEENT: Oropharynx clear, moist mucous membranes  Cardiovascular: RRR, normal S1 and S2, no M/R/G  Respiratory: Clear to ascultation bilaterally, no wheezing  GI: Soft, NT/ND, + bowel sounds bilaterally, no masses  Skin: No rashes or lesions  Extremities: No edema, cyanosis  Access: PIV    Antibiotics:  Anti-Infectives (From admission, onward)    Ordered     Dose/Rate Route Frequency Start Stop    08/09/21 1003  remdesivir 100 mg in sodium chloride 0.9 % 270 mL IVPB (powder vial)     Ordering Provider: Jose Angel Silva DO    100 mg  over 60 Minutes Intravenous Every 24 Hours 08/10/21 1200 08/14/21 1159    08/09/21 1003  remdesivir 200 mg in sodium chloride 0.9 % 290 mL IVPB (powder vial)     Ordering Provider: Jose Angel Silva DO    200 mg  over 60 Minutes Intravenous Every 24 Hours 08/09/21 1200 08/09/21 1404    08/08/21 1245  cefTRIAXone (ROCEPHIN) IVPB 1 g     Ordering Provider: Maximilian Salinas MD    1 g  100 mL/hr over 30 Minutes Intravenous Once 08/08/21 1246 08/08/21 1410           Results Review:     I reviewed the patient's new clinical results.    Lab Results   Component Value Date    WBC 5.16 08/12/2021    HGB 10.3 (L) 08/12/2021    HCT 33.1 (L) 08/12/2021    MCV 84.7 08/12/2021     08/12/2021     Lab Results   Component  Value Date    GLUCOSE 82 08/12/2021    BUN 7 08/12/2021    CREATININE 0.41 (L) 08/12/2021    EGFRIFAFRI >150 08/12/2021    BCR 17.1 08/12/2021    CO2 21.3 (L) 08/12/2021    CALCIUM 8.6 08/12/2021    ALBUMIN 3.10 (L) 08/12/2021    AST 79 (H) 08/12/2021     (H) 08/12/2021       Microbiology:  8/8 respiratory panel positive for Covid 19     8/8 blood cultures no growth     Radiology:  8/8 chest x-ray with bilateral pneumonia consistent with a viral pneumonia such as COVID-19.     8/8 CT angio with bilateral consolidation consistent with pneumonia.     8/8 normal duplex of lower extremity          Assessment/Plan      #Acute hypoxic respiratory failure in the setting of COVID-19 pneumonia in the setting of pregnancy     -Continue remdesivir 100 mg daily (for 5 days or until discharge) as well as dexamethasone 6 mg daily (for 10 days or until discharge).  Today is day 4 of remdesivir and 4 of dexamethasone  -Continued risk-benefit discussion with the patient and still agreeable to continuing with dexamethasone and remdesivir therapy in the setting of pregnancy.  -trend LFTs and creatinine while on remdesivir.  Slight increase in LFTs which can be in the setting of either Covid infection or remdesivir therapy however with 1 more day of therapy likely can continue at this point unless they become significantly more elevated.  -follow CRP and ferritin for monitoring of response.   Continues to improve    ID will follow.

## 2021-08-12 NOTE — PROGRESS NOTES
Name: Regine Martins ADMIT: 2021   : 1995  PCP: Ynes Andres MD    MRN: [id number] LOS: 4 days   AGE/SEX: 25 y.o. female  ROOM: [facility]     Subjective   Subjective   CC: dyspnea  No acute events. Patient is overall feeling much better. She is having a non-productive cough which is about the same. Taking PO. No CP/dyspnea/f/c/n/v/d.    Objective   Objective   Vital Signs  Temp:  [97.1 °F (36.2 °C)-98.1 °F (36.7 °C)] 97.7 °F (36.5 °C)  Heart Rate:  [63-93] 79  Resp:  [20] 20  BP: (106-125)/(75-91) 106/79  SpO2:  [92 %-99 %] 95 %  on  Flow (L/min):  [1-2] 1;   Device (Oxygen Therapy): room air  Body mass index is 35.11 kg/m².  Physical Exam  Vitals and nursing note reviewed.   Constitutional:       General: She is not in acute distress.     Appearance: She is not toxic-appearing or diaphoretic.   HENT:      Head: Normocephalic and atraumatic.      Nose: Nose normal.      Mouth/Throat:      Mouth: Mucous membranes are moist.      Pharynx: Oropharynx is clear.   Eyes:      Extraocular Movements: Extraocular movements intact.      Conjunctiva/sclera: Conjunctivae normal.      Pupils: Pupils are equal, round, and reactive to light.   Cardiovascular:      Rate and Rhythm: Normal rate and regular rhythm.      Pulses: Normal pulses.   Pulmonary:      Effort: Pulmonary effort is normal.      Breath sounds: Normal breath sounds.   Abdominal:      General: Bowel sounds are normal.      Palpations: Abdomen is soft.      Tenderness: There is no abdominal tenderness.   Musculoskeletal:         General: No swelling or tenderness.      Cervical back: Normal range of motion and neck supple.   Skin:     General: Skin is warm and dry.      Capillary Refill: Capillary refill takes less than 2 seconds.   Neurological:      General: No focal deficit present.      Mental Status: She is alert and oriented to person, place, and time.   Psychiatric:         Mood and Affect: Mood normal.         Behavior: Behavior  normal.     Results Review     I reviewed the patient's new clinical results.  I reviewed the patient's telemetry.  I reviewed the patient's CT angiogram of the chest  Results from last 7 days   Lab Units 08/12/21  0452 08/11/21  0641 08/10/21  0546 08/09/21  0834   WBC 10*3/mm3 5.16 3.85 4.84 3.31*   HEMOGLOBIN g/dL 10.3* 10.1* 10.4* 10.3*   PLATELETS 10*3/mm3 333 281 259 204     Results from last 7 days   Lab Units 08/12/21  0452 08/11/21  0641 08/10/21  2028 08/10/21  0546 08/09/21  0834 08/09/21  0834   SODIUM mmol/L 136 135*  --  135*  --  135*   POTASSIUM mmol/L 3.7 3.9 3.9 3.4*   < > 3.1*   CHLORIDE mmol/L 104 104  --  103  --  102   CO2 mmol/L 21.3* 19.5*  --  20.9*  --  18.8*   BUN mg/dL 7 5*  --  5*  --  3*   CREATININE mg/dL 0.41* 0.41*  --  0.45*  --  0.46*   GLUCOSE mg/dL 82 83  --  85  --  80    < > = values in this interval not displayed.   Estimated Creatinine Clearance: 255.4 mL/min (A) (by C-G formula based on SCr of 0.41 mg/dL (L)).  Results from last 7 days   Lab Units 08/12/21  0452 08/11/21  0641 08/10/21  0546 08/09/21  0834   ALBUMIN g/dL 3.10* 2.90* 3.10* 3.40*   BILIRUBIN mg/dL 0.3 0.4 0.4 0.4   ALK PHOS U/L 67 71 62 63   AST (SGOT) U/L 79* 70* 38* 39*   ALT (SGPT) U/L 124* 83* 34* 28     Results from last 7 days   Lab Units 08/12/21 0452 08/11/21  0641 08/10/21  0546 08/09/21  0834 08/08/21  1201 08/08/21  1201   CALCIUM mg/dL 8.6 8.7 8.4* 8.2*   < > 8.8   ALBUMIN g/dL 3.10* 2.90* 3.10* 3.40*   < > 4.10   MAGNESIUM mg/dL  --   --   --   --   --  2.2    < > = values in this interval not displayed.     Results from last 7 days   Lab Units 08/08/21  1328 08/08/21  1201   PROCALCITONIN ng/mL  --  0.09   LACTATE mmol/L 1.4  --      COVID19   Date Value Ref Range Status   08/08/2021 Detected (C) Not Detected - Ref. Range Final     No results found for: HGBA1C, POCGLU    Duplex Venous Lower Extremity - Bilateral CAR  · Normal bilateral lower extremity venous duplex scan.     CT Angiogram  Chest  CT ANGIOGRAPHY OF THE CHEST WITH INTRAVENOUS CONTRAST AND 3D  RECONSTRUCTIONS     HISTORY: COVID-19 pneumonia. Shortness of breath. Early pregnancy.     FINDINGS:  The CT scan was performed as an emergency procedure with CT  angiography protocol using intravenous contrast and 3D reconstructions.  The following findings are present:  1. Because of the timing of contrast, there is only mild to moderate  opacification of the pulmonary arteries. No definite pulmonary embolus  is seen on this limited exam. These findings have been discussed with  Dr. Salinas in the emergency room. The patient is apparently  approximately 9 weeks pregnant and will have additional evaluation with  venous Doppler but no additional contrast or CT scanning is being  performed.  2. There is prominent scattered pneumonia in both lungs, particularly in  the left upper lobe and consistent with COVID-19 pneumonia. There is no  cavitation. There are no pleural effusions.  3. There is no mediastinal, hilar or axillary adenopathy. There is no  pericardial effusion. The CT images through the upper liver, spleen and  both adrenal glands are unremarkable.        Radiation dose reduction techniques were utilized, including automated  exposure control and exposure modulation based on body size.     This report was finalized on 8/8/2021 4:21 PM by Dr. Genaro Christopher M.D.     XR Chest 1 View  ONE VIEW PORTABLE CHEST     HISTORY: COVID-19 pneumonia. Shortness of breath.     FINDINGS: The lungs are moderately expanded with scattered areas of  pneumonia bilaterally, left greater than right and consistent with  COVID-19 pneumonia. The heart size is normal.     This report was finalized on 8/8/2021 12:51 PM by Dr. Genaro Christopher M.D.       Scheduled Medications  dexamethasone, 6 mg, Oral, Daily With Breakfast  enoxaparin, 40 mg, Subcutaneous, Q24H  ferrous sulfate, 325 mg, Oral, Daily With Breakfast  prenatal vitamin, 1 tablet, Oral, Daily  remdesivir,  100 mg, Intravenous, Q24H    Infusions  Pharmacy Consult - Remdesivir,     Diet  Diet Regular       Assessment/Plan     Active Hospital Problems    Diagnosis  POA   • **Acute hypoxemic respiratory failure due to COVID-19 (CMS/[facility]) [U07.1, J96.01]  Unknown   • Hypokalemia [E87.6]  Unknown   • Diarrhea [R19.7]  Unknown   • Anemia [D64.9]  Yes   • Pregnancy [Z34.90]  Not Applicable      Resolved Hospital Problems   No resolved problems to display.   COVID-19 Pneumonia with Acute Hypoxic Respiratory Failure  - patient is unvaccinated  - continue remdesivir and decadron daily  - monitor inflammatory markers  - has some elevated LFTs likely from viral infection vs remdesivir-monitor closely  - encourage pulmonary toilet and wean oxygen as tolerated-will keep sats >92% due to pregnancy which complicates her disease  - ID and pulmonology following, appreciate recs  - appreciate ob/gyn recs        Lovenox 40 mg SC daily for DVT prophylaxis.  Full code.  Discussed with patient, nursing staff and care team on multidisciplinary rounds.  Anticipate discharge home in 1-2 days.      Adan Verdugo MD  [facility]  08/12/21  13:25 EDT

## 2021-08-12 NOTE — PROGRESS NOTES
"Adult Nutrition  Assessment/PES    Patient Name:  Regine Martins  YOB: 1995  MRN: [id number]  Admit Date:  8/8/2021    Assessment Date:  8/12/2021    Comments:  Nutrition screen completed. Pt with COVID 19 pneumonia in the setting of pregnancy. Diet Regular. Po intake around 75%.BMI 35. Labs:  Alb 3.10. Will honor food pref's and offer high protein snacks.     Reason for Assessment     Row Name 08/12/21 0931          Reason for Assessment    Reason For Assessment  identified at risk by screening criteria     Diagnosis  -- COVID 19 pneumonia in the setting of pregnancy     Identified At Risk by Screening Criteria  MST SCORE 2+         Nutrition/Diet History     Row Name 08/12/21 0931          Nutrition/Diet History    Typical Food/Fluid Intake  appetite has not been good; po 75% pt states appetite alittle better this am         Anthropometrics     Row Name 08/12/21 0932 08/12/21 0500       Anthropometrics    Height  170.2 cm (67.01\")  --    Weight  102 kg (224 lb 3.2 oz) not weighed by RD  102 kg (224 lb 3.2 oz)       Ideal Body Weight (IBW)    Ideal Body Weight (IBW) (kg)  61.88  --    % Ideal Body Weight  164.34  --       Body Mass Index (BMI)    BMI (kg/m2)  35.18  --    BMI Assessment  BMI 35-39.9: obesity grade II  --        Labs/Tests/Procedures/Meds     Row Name 08/12/21 0932          Labs/Procedures/Meds    Lab Results Reviewed  reviewed     Lab Results Comments  alb, alt, crp        Diagnostic Tests/Procedures    Diagnostic Test/Procedure Reviewed  reviewed        Medications    Pertinent Medications Reviewed  reviewed         Physical Findings     Row Name 08/12/21 0933          Physical Findings    Overall Physical Appearance  obese         Estimated/Assessed Needs     Row Name 08/12/21 0932          Calculation Measurements    Height  170.2 cm (67.01\")         Nutrition Prescription Ordered     Row Name 08/12/21 0933          Nutrition Prescription PO    Current PO Diet  Regular     "             Problem/Interventions:  Problem 1     Row Name 08/12/21 0933          Nutrition Diagnoses Problem 1    Problem 1  Increased Nutrient Needs     Etiology (related to)  Medical Diagnosis               Intervention Goal     Row Name 08/12/21 0933          Intervention Goal    General  Maintain nutrition     PO  PO intake (%)     PO Intake %  80 %     Weight  Maintain weight         Nutrition Intervention     Row Name 08/12/21 0933          Nutrition Intervention    RD/Tech Action  Follow Tx progress;Care plan reviewd;Interview for preference;Advise available snack         Nutrition Prescription     Row Name 08/12/21 0934          Nutrition Prescription PO    PO Prescription  Other (comment) high protein snack         Education/Evaluation     Row Name 08/12/21 0934          Education    Education  Will Instruct as appropriate        Monitor/Evaluation    Monitor  Per protocol           Electronically signed by:  Myesha Daniel RD  08/12/21 09:36 EDT

## 2021-08-13 ENCOUNTER — READMISSION MANAGEMENT (OUTPATIENT)
Dept: CALL CENTER | Facility: HOSPITAL | Age: 26
End: 2021-08-13

## 2021-08-13 VITALS
SYSTOLIC BLOOD PRESSURE: 105 MMHG | TEMPERATURE: 98 F | OXYGEN SATURATION: 95 % | HEIGHT: 67 IN | BODY MASS INDEX: 35.3 KG/M2 | RESPIRATION RATE: 20 BRPM | DIASTOLIC BLOOD PRESSURE: 66 MMHG | WEIGHT: 224.9 LBS | HEART RATE: 71 BPM

## 2021-08-13 PROBLEM — D89.832 CYTOKINE RELEASE SYNDROME, GRADE 2: Status: ACTIVE | Noted: 2021-08-13

## 2021-08-13 PROBLEM — E87.6 HYPOKALEMIA: Status: RESOLVED | Noted: 2021-08-09 | Resolved: 2021-08-13

## 2021-08-13 PROBLEM — R19.7 DIARRHEA: Status: RESOLVED | Noted: 2021-08-09 | Resolved: 2021-08-13

## 2021-08-13 LAB
ALBUMIN SERPL-MCNC: 3 G/DL (ref 3.5–5.2)
ALBUMIN/GLOB SERPL: 1 G/DL
ALP SERPL-CCNC: 65 U/L (ref 39–117)
ALT SERPL W P-5'-P-CCNC: 147 U/L (ref 1–33)
ANION GAP SERPL CALCULATED.3IONS-SCNC: 10 MMOL/L (ref 5–15)
ANISOCYTOSIS BLD QL: NORMAL
AST SERPL-CCNC: 66 U/L (ref 1–32)
BACTERIA SPEC AEROBE CULT: NORMAL
BACTERIA SPEC AEROBE CULT: NORMAL
BILIRUB CONJ SERPL-MCNC: <0.2 MG/DL (ref 0–0.3)
BILIRUB SERPL-MCNC: 0.4 MG/DL (ref 0–1.2)
BUN SERPL-MCNC: 7 MG/DL (ref 6–20)
BUN/CREAT SERPL: 15.9 (ref 7–25)
CALCIUM SPEC-SCNC: 8.7 MG/DL (ref 8.6–10.5)
CHLORIDE SERPL-SCNC: 104 MMOL/L (ref 98–107)
CO2 SERPL-SCNC: 22 MMOL/L (ref 22–29)
CREAT SERPL-MCNC: 0.44 MG/DL (ref 0.57–1)
CRP SERPL-MCNC: 2.51 MG/DL (ref 0–0.5)
DEPRECATED RDW RBC AUTO: 44.6 FL (ref 37–54)
ERYTHROCYTE [DISTWIDTH] IN BLOOD BY AUTOMATED COUNT: 15.1 % (ref 12.3–15.4)
FERRITIN SERPL-MCNC: 183 NG/ML (ref 13–150)
GFR SERPL CREATININE-BSD FRML MDRD: >150 ML/MIN/1.73
GLOBULIN UR ELPH-MCNC: 2.9 GM/DL
GLUCOSE SERPL-MCNC: 83 MG/DL (ref 65–99)
HCT VFR BLD AUTO: 31.4 % (ref 34–46.6)
HGB BLD-MCNC: 10.2 G/DL (ref 12–15.9)
LYMPHOCYTES # BLD MANUAL: 1.76 10*3/MM3 (ref 0.7–3.1)
LYMPHOCYTES NFR BLD MANUAL: 24 % (ref 19.6–45.3)
LYMPHOCYTES NFR BLD MANUAL: 8 % (ref 5–12)
MCH RBC QN AUTO: 26.6 PG (ref 26.6–33)
MCHC RBC AUTO-ENTMCNC: 32.5 G/DL (ref 31.5–35.7)
MCV RBC AUTO: 82 FL (ref 79–97)
MONOCYTES # BLD AUTO: 0.59 10*3/MM3 (ref 0.1–0.9)
NEUTROPHILS # BLD AUTO: 5 10*3/MM3 (ref 1.7–7)
NEUTROPHILS NFR BLD MANUAL: 68 % (ref 42.7–76)
PLAT MORPH BLD: NORMAL
PLATELET # BLD AUTO: 381 10*3/MM3 (ref 140–450)
PMV BLD AUTO: 10.5 FL (ref 6–12)
POTASSIUM SERPL-SCNC: 3.8 MMOL/L (ref 3.5–5.2)
PROT SERPL-MCNC: 5.9 G/DL (ref 6–8.5)
RBC # BLD AUTO: 3.83 10*6/MM3 (ref 3.77–5.28)
SODIUM SERPL-SCNC: 136 MMOL/L (ref 136–145)
TROPONIN T SERPL-MCNC: <0.01 NG/ML (ref 0–0.03)
WBC # BLD AUTO: 7.35 10*3/MM3 (ref 3.4–10.8)
WBC MORPH BLD: NORMAL

## 2021-08-13 PROCEDURE — 85025 COMPLETE CBC W/AUTO DIFF WBC: CPT | Performed by: HOSPITALIST

## 2021-08-13 PROCEDURE — 80053 COMPREHEN METABOLIC PANEL: CPT | Performed by: HOSPITALIST

## 2021-08-13 PROCEDURE — 63710000001 DEXAMETHASONE PER 0.25 MG: Performed by: HOSPITALIST

## 2021-08-13 PROCEDURE — 84484 ASSAY OF TROPONIN QUANT: CPT | Performed by: INTERNAL MEDICINE

## 2021-08-13 PROCEDURE — 93005 ELECTROCARDIOGRAM TRACING: CPT | Performed by: INTERNAL MEDICINE

## 2021-08-13 PROCEDURE — 85007 BL SMEAR W/DIFF WBC COUNT: CPT | Performed by: HOSPITALIST

## 2021-08-13 PROCEDURE — 93010 ELECTROCARDIOGRAM REPORT: CPT | Performed by: INTERNAL MEDICINE

## 2021-08-13 PROCEDURE — 82248 BILIRUBIN DIRECT: CPT | Performed by: STUDENT IN AN ORGANIZED HEALTH CARE EDUCATION/TRAINING PROGRAM

## 2021-08-13 PROCEDURE — 99232 SBSQ HOSP IP/OBS MODERATE 35: CPT | Performed by: STUDENT IN AN ORGANIZED HEALTH CARE EDUCATION/TRAINING PROGRAM

## 2021-08-13 PROCEDURE — 86140 C-REACTIVE PROTEIN: CPT | Performed by: HOSPITALIST

## 2021-08-13 PROCEDURE — 82728 ASSAY OF FERRITIN: CPT | Performed by: HOSPITALIST

## 2021-08-13 PROCEDURE — 25010000002 ENOXAPARIN PER 10 MG: Performed by: HOSPITALIST

## 2021-08-13 RX ADMIN — Medication 1 TABLET: at 08:34

## 2021-08-13 RX ADMIN — FERROUS SULFATE TAB 325 MG (65 MG ELEMENTAL FE) 325 MG: 325 (65 FE) TAB at 08:34

## 2021-08-13 RX ADMIN — DEXAMETHASONE 6 MG: 4 TABLET ORAL at 08:34

## 2021-08-13 RX ADMIN — REMDESIVIR 100 MG: 100 INJECTION, POWDER, LYOPHILIZED, FOR SOLUTION INTRAVENOUS at 12:39

## 2021-08-13 RX ADMIN — ENOXAPARIN SODIUM 40 MG: 40 INJECTION SUBCUTANEOUS at 08:34

## 2021-08-13 NOTE — PROGRESS NOTES
LOS: 5 days     Chief Complaint: Covid pneumonia    Interval History: Patient reports she feels much better today.  She is off oxygen and tolerating this well.  States she slept largely well overnight.  Denies any fevers or chills.  Denies any nausea, vomiting, diarrhea.  States she is eating okay.    Vital Signs  Temp:  [97.1 °F (36.2 °C)-97.9 °F (36.6 °C)] 97.5 °F (36.4 °C)  Heart Rate:  [63-84] 70  Resp:  [20] 20  BP: (106-123)/(71-88) 110/71    Physical Exam:  General: In no acute distress  HEENT: Oropharynx clear, moist mucous membranes  Cardiovascular: RRR, normal S1 and S2, no M/R/G  Respiratory: Clear to ascultation bilaterally, no wheezing  GI: Soft, NT/ND, + bowel sounds bilaterally, no masses  Skin: No rashes or lesions  Extremities: No edema, cyanosis  Access: Peripheral IV    Antibiotics:  Anti-Infectives (From admission, onward)    Ordered     Dose/Rate Route Frequency Start Stop    08/09/21 1003  remdesivir 100 mg in sodium chloride 0.9 % 270 mL IVPB (powder vial)     Ordering Provider: Jose Angel Silva DO    100 mg  over 60 Minutes Intravenous Every 24 Hours 08/10/21 1200 08/14/21 1159    08/09/21 1003  remdesivir 200 mg in sodium chloride 0.9 % 290 mL IVPB (powder vial)     Ordering Provider: Jose Angel Silva DO    200 mg  over 60 Minutes Intravenous Every 24 Hours 08/09/21 1200 08/09/21 1404    08/08/21 1245  cefTRIAXone (ROCEPHIN) IVPB 1 g     Ordering Provider: Maximilian Salinas MD    1 g  100 mL/hr over 30 Minutes Intravenous Once 08/08/21 1246 08/08/21 1410           Results Review:     I reviewed the patient's new clinical results.    Lab Results   Component Value Date    WBC 7.35 08/13/2021    HGB 10.2 (L) 08/13/2021    HCT 31.4 (L) 08/13/2021    MCV 82.0 08/13/2021     08/13/2021     Lab Results   Component Value Date    GLUCOSE 83 08/13/2021    BUN 7 08/13/2021    CREATININE 0.44 (L) 08/13/2021    EGFRIFAFRI >150 08/13/2021    BCR 15.9 08/13/2021    CO2 22.0 08/13/2021     CALCIUM 8.7 08/13/2021    ALBUMIN 3.00 (L) 08/13/2021    AST 66 (H) 08/13/2021     (H) 08/13/2021       Microbiology:  8/8 respiratory panel positive for Covid 19     8/8 blood cultures no growth     Radiology:  8/8 chest x-ray with bilateral pneumonia consistent with a viral pneumonia such as COVID-19.     8/8 CT angio with bilateral consolidation consistent with pneumonia.     8/8 normal duplex of lower extremity    Assessment/Plan   #Acute hypoxic respiratory failure in the setting of COVID-19 pneumonia in the setting of pregnancy  #Transaminitis     -Continue remdesivir 100 mg daily (for 5 days or until discharge) as well as dexamethasone 6 mg daily (for 10 days or until discharge).  Today is day 5/5 of remdesivir and 5/10 of dexamethasone.   -At this point it would seem reasonable for discharge if she remains off oxygen.  If she was discharged can stop dexamethasone  -She continues to have mild transaminitis however today is the last day confirmed a severe.  Will need follow-up with PCP outpatient to assure resolution after Covid course.  -If she remains in house would recommend continuing to trend CRP however all her inflammatory markers continue to improve including ferritin and CRP      Thank you for allowing me to be involved in the care of this patient. Infectious diseases will sign off at this time with antibiotics plan in place, but please call me at [phone] if any further ID questions or new ID concerns.

## 2021-08-13 NOTE — DISCHARGE SUMMARY
"Date of Admission: 8/8/2021  Date of Discharge:  8/13/2021  Primary Care Physician: Ynes Andres MD     Discharge Diagnosis:  Active Hospital Problems    Diagnosis  POA   • **Acute hypoxemic respiratory failure due to COVID-19 (CMS/[facility]) [U07.1, J96.01]  Yes   • Cytokine release syndrome, grade 2 [D89.832]  Unknown   • Anemia [D64.9]  Yes   • Pregnancy [Z34.90]  Not Applicable      Resolved Hospital Problems    Diagnosis Date Resolved POA   • Hypokalemia [E87.6] 08/13/2021 Yes   • Diarrhea [R19.7] 08/13/2021 Yes       Presenting Problem/History of Present Illness:  9 weeks gestation of pregnancy [Z3A.09]  Pneumonia due to COVID-19 virus [U07.1, J12.82]     Hospital Course:  The patient is a 25 y.o. female who is 9 weeks pregnant who presented with cough and shortness of breath. Please see admission H&P for further details. She is unvaccinated. She was hypoxic and was started on remdesivir and decadron-she completed a 5 day course of the former. Her respiratory status and inflammatory markers steadily improved. She was doing well on room air at the time of discharge. OB/Gyn evaluated her and had no further recommendations-she should keep follow up with them as an outpatient.   Of note she did have some borderline bradycardia at rest which was asymptomatic- her heart rate returned to normal to slightly elevated with activity. There were no significant changes on her ECG and troponin was negative. She had been evaluated with a CTA of the chest and BLE venous dopplers earlier in the admission and these were negative.  She is feeling much better and will be discharged home.    Exam Today:  Blood pressure 105/66, pulse 71, temperature 98 °F (36.7 °C), temperature source Oral, resp. rate 20, height 170.2 cm (67.01\"), weight 102 kg (224 lb 14.4 oz), SpO2 95 %, currently breastfeeding.  Vitals and nursing note reviewed.   Constitutional:       General: She is not in acute distress.     Appearance: She is not " toxic-appearing or diaphoretic.   HENT:      Head: Normocephalic and atraumatic.      Nose: Nose normal.      Mouth/Throat:      Mouth: Mucous membranes are moist.      Pharynx: Oropharynx is clear.   Eyes:      Extraocular Movements: Extraocular movements intact.      Conjunctiva/sclera: Conjunctivae normal.      Pupils: Pupils are equal, round, and reactive to light.   Cardiovascular:      Rate and Rhythm: Normal rate and regular rhythm.      Pulses: Normal pulses.   Pulmonary:      Effort: Pulmonary effort is normal.      Breath sounds: Normal breath sounds.   Abdominal:      General: Bowel sounds are normal.      Palpations: Abdomen is soft.      Tenderness: There is no abdominal tenderness.   Musculoskeletal:         General: No swelling or tenderness.      Cervical back: Normal range of motion and neck supple.   Skin:     General: Skin is warm and dry.      Capillary Refill: Capillary refill takes less than 2 seconds.   Neurological:      General: No focal deficit present.      Mental Status: She is alert and oriented to person, place, and time.   Psychiatric:         Mood and Affect: Mood normal.         Behavior: Behavior normal.     Consults:   Consults     Date and Time Order Name Status Description    8/10/2021  9:15 AM Inpatient Pulmonology Consult Completed     8/10/2021  8:57 AM Inpatient Obstetrics / Gynecology Consult Completed     8/8/2021  7:12 PM Inpatient Infectious Diseases Consult Completed     8/8/2021  2:33 PM [facility] (on-call MD unless specified) Details Completed     8/8/2021 12:43 PM Family Medicine Consult Completed     8/8/2021 12:06 PM OB/GYN (on-call MD unless specified) Completed            Discharge Disposition:  Home or Self Care    Discharge Medications:     Discharge Medications      Continue These Medications      Instructions Start Date   ferrous sulfate 325 (65 FE) MG tablet   325 mg, Oral, Daily With Breakfast      ibuprofen 600 MG tablet  Commonly known as: ADVIL,MOTRIN   600 mg,  Oral, Every 8 Hours PRN      prenatal (CLASSIC) vitamin  tablet  Generic drug: prenatal vitamin   1 tablet, Oral, Daily      valACYclovir 500 MG tablet  Commonly known as: VALTREX   500 mg, Oral, 2 Times Daily             Discharge Diet:   Diet Instructions     Diet: Regular; Thin      Discharge Diet: Regular    Fluid Consistency: Thin          Activity at Discharge:   Activity Instructions     Activity as Tolerated            Follow-up Appointments:  No future appointments.  Additional Instructions for the Follow-ups that You Need to Schedule     Discharge Follow-up with PCP   As directed       Currently Documented PCP:    Ynes Andres MD    PCP Phone Number:    [phone]     Follow Up Details: 1 week               Adan Verdugo MD  08/13/21  16:10 EDT    Time Spent on Discharge Activities: Greater than 30 minutes.

## 2021-08-14 ENCOUNTER — READMISSION MANAGEMENT (OUTPATIENT)
Dept: CALL CENTER | Facility: HOSPITAL | Age: 26
End: 2021-08-14

## 2021-08-14 LAB — QT INTERVAL: 484 MS

## 2021-08-14 NOTE — OUTREACH NOTE
Prep Survey      Responses   [facility] facility patient discharged from?  [place]   Is LACE score < 7 ?  Yes   Emergency Room discharge w/ pulse ox?  No   Eligibility  Readm Mgmt   Discharge diagnosis  Covid 19   Does the patient have one of the following disease processes/diagnoses(primary or secondary)?  COVID-19   Does the patient have Home health ordered?  No   Is there a DME ordered?  No   Prep survey completed?  Yes          Naty Winkler RN

## 2021-08-14 NOTE — OUTREACH NOTE
COVID-19 Week 1 Survey      Responses   [facility] patient discharged from?  [place]   Does the patient have one of the following disease processes/diagnoses(primary or secondary)?  COVID-19   COVID-19 underlying condition?  None   Call Number  Call 1   Week 1 Call successful?  Yes   Call start time  0900   Call end time  0903   Discharge diagnosis  Covid 19   Meds reviewed with patient/caregiver?  Yes   Is the patient having any side effects they believe may be caused by any medication additions or changes?  No   Does the patient have all medications ordered at discharge?  N/A   Prescription comments  Pt takes prenatal vitamins   Is the patient taking all medications as directed (includes completed medication regime)?  Yes   Does the patient have a primary care provider?   Yes   Psychosocial issues?  No   Did the patient receive a copy of their discharge instructions?  Yes   Did the patient receive a copy of COVID-19 specific instructions?  Yes   Nursing interventions  Reviewed instructions with patient   What is the patient's perception of their health status since discharge?  Improving   Does the patient have any of the following symptoms?  Cough   Nursing Interventions  Nurse provided patient education   Pulse Ox monitoring  None   Is the patient/caregiver able to teach back steps to recovery at home?  Set small, achievable goals for return to baseline health, Rest and rebuild strength, gradually increase activity, Eat a well-balance diet   If the patient is a current smoker, are they able to teach back resources for cessation?  Not a smoker   Is the patient/caregiver able to teach back the hierarchy of who to call/visit for symptoms/problems? PCP, Specialist, Home health nurse, Urgent Care, ED, 911  Yes   COVID-19 call completed?  Yes   Wrap up additional comments  Pt states she is doing better. Pt is pregnant,  advised pt to make PCP hospital fu appt. Pt will call Monday to make PCP hospital MD fu  appt. No questions/concerns.          Eileen Egan RN

## 2021-08-16 ENCOUNTER — NURSE TRIAGE (OUTPATIENT)
Dept: CALL CENTER | Facility: HOSPITAL | Age: 26
End: 2021-08-16

## 2021-08-16 ENCOUNTER — READMISSION MANAGEMENT (OUTPATIENT)
Dept: CALL CENTER | Facility: HOSPITAL | Age: 26
End: 2021-08-16

## 2021-08-16 NOTE — CASE MANAGEMENT/SOCIAL WORK
Case Management Discharge Note      Final Note: DC'd home. Soheila Ventura RN         Selected Continued Care - Discharged on 8/13/2021 Admission date: 8/8/2021 - Discharge disposition: Home or Self Care    Destination    No services have been selected for the patient.              Durable Medical Equipment    No services have been selected for the patient.              Dialysis/Infusion    No services have been selected for the patient.              Home Medical Care    No services have been selected for the patient.              Therapy    No services have been selected for the patient.              Community Resources    No services have been selected for the patient.              Community & DME    No services have been selected for the patient.                  Transportation Services  Private: Car    Final Discharge Disposition Code: 01 - home or self-care

## 2021-08-16 NOTE — TELEPHONE ENCOUNTER
"    Reason for Disposition  • Caller requesting an appointment, triage offered and declined    Additional Information  • Negative: Lab calling with strep throat test results and triager can call in prescription  • Negative: Lab calling with urinalysis test results and triager can call in prescription  • Negative: Medication questions  • Negative: ED call to PCP  • Negative: Physician call to PCP  • Negative: Call about patient who is currently hospitalized  • Negative: Lab or radiology calling with CRITICAL test results  • Negative: [1] Prescription not at pharmacy AND [2] was prescribed today by PCP  • Negative: [1] Follow-up call from patient regarding patient's clinical status AND [2] information urgent  • Negative: [1] Caller requests to speak ONLY to PCP AND [2] URGENT question  • Negative: [1] Caller requests to speak to PCP now AND [2] won't tell us reason for call  (Exception: if 10 pm to 6 am, caller must first discuss reason for the call)  • Negative: Notification of hospital admission  • Negative: Notification of death  • Negative: Caller requesting lab results  • Negative: Lab or radiology calling with test results  • Negative: [1] Follow-up call from patient regarding patient's clinical status AND [2] information NON-URGENT  • Negative: [1] Caller requests to speak ONLY to PCP AND [2] NON-URGENT question  • Negative: [1] Other NON-URGENT information for PCP AND [2] does not require PCP response    Answer Assessment - Initial Assessment Questions  1. REASON FOR CALL or QUESTION: \"What is your reason for calling today?\" or \"How can I best  help you?\" or \"What question do you have that I can help answer?\"      Needs release for work  2. CALLER: Document the source of call. (e.g., laboratory, patient).      Patient is caller. Covid positive 8/8/21 treated inpatient for hypoxia and resp failure.    Protocols used: PCP CALL - NO TRIAGE-ADULT-      "

## 2021-08-16 NOTE — OUTREACH NOTE
COVID-19 Week 3 Survey      Responses   [facility] patient discharged from?  [place]   Does the patient have one of the following disease processes/diagnoses(primary or secondary)?  COVID-19   COVID-19 underlying condition?  None   Call start time  1150   Call end time  1152   Discharge diagnosis  Covid 19   Person spoke with today (if not patient) and relationship  patient   Meds reviewed with patient/caregiver?  Yes   Is the patient having any side effects they believe may be caused by any medication additions or changes?  No   Does the patient have all medications ordered at discharge?  N/A   Is the patient taking all medications as directed (includes completed medication regime)?  Yes   Does the patient have a primary care provider?   Yes   Comments regarding PCP  PCP fu appt 8/16/21   Does the patient have an appointment with their PCP or specialist within 7 days of discharge?  Yes   Has the patient kept scheduled appointments due by today?  N/A   Psychosocial issues?  No   What is the patient's perception of their health status since discharge?  Improving   Does the patient have any of the following symptoms?  Cough   Nursing Interventions  Nurse provided patient education   Pulse Ox monitoring  None   Is the patient/caregiver able to teach back steps to recovery at home?  Rest and rebuild strength, gradually increase activity, Set small, achievable goals for return to baseline health, Eat a well-balance diet   Wrap up additional comments  Pt states she is doing good,  has a slight cough. Pt has PCP fu appt today,  and is out of quarantine. No questions/concerns.          Eileen Egan RN

## 2021-08-17 ENCOUNTER — READMISSION MANAGEMENT (OUTPATIENT)
Dept: CALL CENTER | Facility: HOSPITAL | Age: 26
End: 2021-08-17

## 2021-08-17 NOTE — OUTREACH NOTE
COVID-19 Week 1 Survey      Responses   [facility] patient discharged from?  [place]   Does the patient have one of the following disease processes/diagnoses(primary or secondary)?  COVID-19   COVID-19 underlying condition?  None   Call Number  Call 3   Week 1 Call successful?  No   Discharge diagnosis  Covid 19          Eileen Egan RN   [Reproductive Age] : is of reproductive age [Condoms] : uses condoms [Contraception] : uses contraception [Definite:  ___ (Date)] : the last menstrual period was [unfilled] [Menarche Age: ____] : age at menarche was [unfilled] [Sexually Active] : is sexually active [Male ___] : [unfilled] male [Yes] : yes [Menstrual Problems] : reports normal menses [Pregnancy History] : denies prior pregnancies

## 2021-08-20 ENCOUNTER — READMISSION MANAGEMENT (OUTPATIENT)
Dept: CALL CENTER | Facility: HOSPITAL | Age: 26
End: 2021-08-20

## 2021-08-20 NOTE — OUTREACH NOTE
COVID-19 Week 2 Survey      Responses   [facility] [place] patient discharged from?  [place]   Does the patient have one of the following disease processes/diagnoses(primary or secondary)?  COVID-19   COVID-19 underlying condition?  None   Call Number  Call 1   COVID-19 Week 2: Call 1 attempt successful?  Yes   Call start time  1107   Call end time  1109   Discharge diagnosis  Covid 19   Meds reviewed with patient/caregiver?  Yes   Does the patient have all medications ordered at discharge?  N/A   Is the patient taking all medications as directed (includes completed medication regime)?  Yes   Does the patient have a primary care provider?   Yes   Has the patient kept scheduled appointments due by today?  Yes   Has home health visited the patient within 72 hours of discharge?  N/A   Psychosocial issues?  No   Did the patient receive a copy of their discharge instructions?  Yes   Did the patient receive a copy of COVID-19 specific instructions?  Yes   Nursing interventions  Reviewed instructions with patient   What is the patient's perception of their health status since discharge?  Improving   Does the patient have any of the following symptoms?  None   Nursing Interventions  Nurse provided patient education   Pulse Ox monitoring  None   Is the patient/caregiver able to teach back steps to recovery at home?  Set small, achievable goals for return to baseline health, Rest and rebuild strength, gradually increase activity, Eat a well-balance diet, Practice good oral hygiene   If the patient is a current smoker, are they able to teach back resources for cessation?  Not a smoker   Is the patient/caregiver able to teach back the hierarchy of who to call/visit for symptoms/problems? PCP, Specialist, Home health nurse, Urgent Care, ED, 911  Yes   COVID-19 call completed?  Yes          eJnni Rowe LPN

## 2022-01-17 ENCOUNTER — TRANSCRIBE ORDERS (OUTPATIENT)
Dept: ADMINISTRATIVE | Facility: HOSPITAL | Age: 27
End: 2022-01-17

## 2022-01-17 DIAGNOSIS — D64.9 ANEMIA, UNSPECIFIED TYPE: Primary | ICD-10-CM

## 2022-01-19 RX ORDER — FAMOTIDINE 20 MG/1
20 TABLET, FILM COATED ORAL ONCE
Status: CANCELLED | OUTPATIENT
Start: 2022-01-20

## 2022-01-19 RX ORDER — ACETAMINOPHEN 325 MG/1
650 TABLET ORAL ONCE
Status: CANCELLED | OUTPATIENT
Start: 2022-01-20

## 2022-01-19 RX ORDER — DIPHENHYDRAMINE HCL 25 MG
25 CAPSULE ORAL ONCE
Status: CANCELLED | OUTPATIENT
Start: 2022-01-20

## 2022-01-19 RX ORDER — SODIUM CHLORIDE 9 MG/ML
250 INJECTION, SOLUTION INTRAVENOUS ONCE
Status: CANCELLED | OUTPATIENT
Start: 2022-01-20

## 2022-01-19 RX ORDER — DIPHENHYDRAMINE HYDROCHLORIDE 50 MG/ML
25 INJECTION INTRAMUSCULAR; INTRAVENOUS ONCE
Status: CANCELLED | OUTPATIENT
Start: 2022-01-20

## 2022-01-20 ENCOUNTER — HOSPITAL ENCOUNTER (OUTPATIENT)
Dept: INFUSION THERAPY | Facility: HOSPITAL | Age: 27
Discharge: HOME | End: 2022-01-20
Admitting: OBSTETRICS & GYNECOLOGY

## 2022-01-20 VITALS
SYSTOLIC BLOOD PRESSURE: 113 MMHG | TEMPERATURE: 98.6 F | RESPIRATION RATE: 18 BRPM | HEART RATE: 70 BPM | OXYGEN SATURATION: 100 % | DIASTOLIC BLOOD PRESSURE: 68 MMHG

## 2022-01-20 DIAGNOSIS — D50.9 IRON DEFICIENCY ANEMIA, UNSPECIFIED IRON DEFICIENCY ANEMIA TYPE: Primary | ICD-10-CM

## 2022-01-20 PROCEDURE — 63710000001 DIPHENHYDRAMINE PER 50 MG: Performed by: OBSTETRICS & GYNECOLOGY

## 2022-01-20 PROCEDURE — 25010000002 IRON SUCROSE PER 1 MG: Performed by: OBSTETRICS & GYNECOLOGY

## 2022-01-20 PROCEDURE — 96365 THER/PROPH/DIAG IV INF INIT: CPT

## 2022-01-20 PROCEDURE — 96366 THER/PROPH/DIAG IV INF ADDON: CPT

## 2022-01-20 RX ORDER — SODIUM CHLORIDE 9 MG/ML
250 INJECTION, SOLUTION INTRAVENOUS ONCE
Status: DISCONTINUED | OUTPATIENT
Start: 2022-01-20 | End: 2022-01-22 | Stop reason: HOSPADM

## 2022-01-20 RX ORDER — DIPHENHYDRAMINE HYDROCHLORIDE 50 MG/ML
25 INJECTION INTRAMUSCULAR; INTRAVENOUS ONCE
Status: DISCONTINUED | OUTPATIENT
Start: 2022-01-20 | End: 2022-01-22 | Stop reason: HOSPADM

## 2022-01-20 RX ORDER — SODIUM CHLORIDE 9 MG/ML
250 INJECTION, SOLUTION INTRAVENOUS ONCE
Status: CANCELLED | OUTPATIENT
Start: 2022-01-27

## 2022-01-20 RX ORDER — FAMOTIDINE 20 MG/1
20 TABLET, FILM COATED ORAL ONCE
Status: COMPLETED | OUTPATIENT
Start: 2022-01-20 | End: 2022-01-20

## 2022-01-20 RX ORDER — FAMOTIDINE 20 MG/1
20 TABLET, FILM COATED ORAL ONCE
Status: CANCELLED | OUTPATIENT
Start: 2022-01-27

## 2022-01-20 RX ORDER — DIPHENHYDRAMINE HCL 25 MG
25 CAPSULE ORAL ONCE
Status: COMPLETED | OUTPATIENT
Start: 2022-01-20 | End: 2022-01-20

## 2022-01-20 RX ORDER — DIPHENHYDRAMINE HCL 25 MG
25 CAPSULE ORAL ONCE
Status: CANCELLED | OUTPATIENT
Start: 2022-01-27

## 2022-01-20 RX ORDER — ACETAMINOPHEN 325 MG/1
650 TABLET ORAL ONCE
Status: CANCELLED | OUTPATIENT
Start: 2022-01-27

## 2022-01-20 RX ORDER — ACETAMINOPHEN 325 MG/1
650 TABLET ORAL ONCE
Status: COMPLETED | OUTPATIENT
Start: 2022-01-20 | End: 2022-01-20

## 2022-01-20 RX ORDER — DIPHENHYDRAMINE HYDROCHLORIDE 50 MG/ML
25 INJECTION INTRAMUSCULAR; INTRAVENOUS ONCE
Status: CANCELLED | OUTPATIENT
Start: 2022-01-27

## 2022-01-20 RX ADMIN — IRON SUCROSE 300 MG: 20 INJECTION, SOLUTION INTRAVENOUS at 12:09

## 2022-01-20 RX ADMIN — FAMOTIDINE 20 MG: 20 TABLET, FILM COATED ORAL at 11:43

## 2022-01-20 RX ADMIN — DIPHENHYDRAMINE HYDROCHLORIDE 25 MG: 25 CAPSULE ORAL at 11:43

## 2022-01-20 RX ADMIN — ACETAMINOPHEN 650 MG: 325 TABLET, FILM COATED ORAL at 11:43

## 2022-01-20 NOTE — PROGRESS NOTES
Pt given information verbally and written concerning medication. Pt verbalized understanding.  Pt tolerated infusion well and monitored for 30 min post infusion without complication.  Pt given AVS and dc'ed ambulatory.

## 2022-01-27 ENCOUNTER — HOSPITAL ENCOUNTER (OUTPATIENT)
Dept: INFUSION THERAPY | Facility: HOSPITAL | Age: 27
Discharge: HOME | End: 2022-01-27
Admitting: OBSTETRICS & GYNECOLOGY

## 2022-01-27 VITALS
HEART RATE: 86 BPM | OXYGEN SATURATION: 100 % | DIASTOLIC BLOOD PRESSURE: 60 MMHG | RESPIRATION RATE: 20 BRPM | TEMPERATURE: 97.8 F | SYSTOLIC BLOOD PRESSURE: 114 MMHG

## 2022-01-27 DIAGNOSIS — D50.9 IRON DEFICIENCY ANEMIA, UNSPECIFIED IRON DEFICIENCY ANEMIA TYPE: Primary | ICD-10-CM

## 2022-01-27 PROCEDURE — 96366 THER/PROPH/DIAG IV INF ADDON: CPT

## 2022-01-27 PROCEDURE — 25010000002 IRON SUCROSE PER 1 MG: Performed by: OBSTETRICS & GYNECOLOGY

## 2022-01-27 PROCEDURE — 96365 THER/PROPH/DIAG IV INF INIT: CPT

## 2022-01-27 RX ORDER — FAMOTIDINE 20 MG/1
20 TABLET, FILM COATED ORAL ONCE
Status: CANCELLED | OUTPATIENT
Start: 2022-02-03

## 2022-01-27 RX ORDER — SODIUM CHLORIDE 9 MG/ML
250 INJECTION, SOLUTION INTRAVENOUS ONCE
Status: CANCELLED | OUTPATIENT
Start: 2022-02-03

## 2022-01-27 RX ORDER — DIPHENHYDRAMINE HCL 25 MG
25 CAPSULE ORAL ONCE
Status: CANCELLED | OUTPATIENT
Start: 2022-02-03

## 2022-01-27 RX ORDER — DIPHENHYDRAMINE HYDROCHLORIDE 50 MG/ML
25 INJECTION INTRAMUSCULAR; INTRAVENOUS ONCE
OUTPATIENT
Start: 2022-02-03

## 2022-01-27 RX ORDER — ACETAMINOPHEN 325 MG/1
650 TABLET ORAL ONCE
Status: CANCELLED | OUTPATIENT
Start: 2022-02-03

## 2022-01-27 RX ADMIN — IRON SUCROSE 300 MG: 20 INJECTION, SOLUTION INTRAVENOUS at 14:28

## 2022-02-03 ENCOUNTER — HOSPITAL ENCOUNTER (OUTPATIENT)
Dept: INFUSION THERAPY | Facility: HOSPITAL | Age: 27
Discharge: HOME | End: 2022-02-03
Admitting: OBSTETRICS & GYNECOLOGY

## 2022-02-03 VITALS
HEART RATE: 68 BPM | TEMPERATURE: 98.4 F | SYSTOLIC BLOOD PRESSURE: 109 MMHG | RESPIRATION RATE: 20 BRPM | DIASTOLIC BLOOD PRESSURE: 57 MMHG | OXYGEN SATURATION: 96 %

## 2022-02-03 DIAGNOSIS — D50.9 IRON DEFICIENCY ANEMIA, UNSPECIFIED IRON DEFICIENCY ANEMIA TYPE: Primary | ICD-10-CM

## 2022-02-03 PROCEDURE — 25010000002 IRON SUCROSE PER 1 MG: Performed by: OBSTETRICS & GYNECOLOGY

## 2022-02-03 PROCEDURE — 96365 THER/PROPH/DIAG IV INF INIT: CPT

## 2022-02-03 PROCEDURE — 63710000001 DIPHENHYDRAMINE PER 50 MG: Performed by: OBSTETRICS & GYNECOLOGY

## 2022-02-03 PROCEDURE — 96366 THER/PROPH/DIAG IV INF ADDON: CPT

## 2022-02-03 RX ORDER — SODIUM CHLORIDE 9 MG/ML
250 INJECTION, SOLUTION INTRAVENOUS ONCE
OUTPATIENT
Start: 2022-02-10

## 2022-02-03 RX ORDER — DIPHENHYDRAMINE HYDROCHLORIDE 50 MG/ML
25 INJECTION INTRAMUSCULAR; INTRAVENOUS ONCE
OUTPATIENT
Start: 2022-02-10

## 2022-02-03 RX ORDER — DIPHENHYDRAMINE HCL 25 MG
25 CAPSULE ORAL ONCE
OUTPATIENT
Start: 2022-02-10

## 2022-02-03 RX ORDER — SODIUM CHLORIDE 9 MG/ML
250 INJECTION, SOLUTION INTRAVENOUS ONCE
Status: DISCONTINUED | OUTPATIENT
Start: 2022-02-03 | End: 2022-02-05 | Stop reason: HOSPADM

## 2022-02-03 RX ORDER — FAMOTIDINE 20 MG/1
20 TABLET, FILM COATED ORAL ONCE
Status: COMPLETED | OUTPATIENT
Start: 2022-02-03 | End: 2022-02-03

## 2022-02-03 RX ORDER — FAMOTIDINE 20 MG/1
20 TABLET, FILM COATED ORAL ONCE
OUTPATIENT
Start: 2022-02-10

## 2022-02-03 RX ORDER — ACETAMINOPHEN 325 MG/1
650 TABLET ORAL ONCE
Status: COMPLETED | OUTPATIENT
Start: 2022-02-03 | End: 2022-02-03

## 2022-02-03 RX ORDER — DIPHENHYDRAMINE HCL 25 MG
25 CAPSULE ORAL ONCE
Status: COMPLETED | OUTPATIENT
Start: 2022-02-03 | End: 2022-02-03

## 2022-02-03 RX ORDER — ACETAMINOPHEN 325 MG/1
650 TABLET ORAL ONCE
OUTPATIENT
Start: 2022-02-10

## 2022-02-03 RX ADMIN — IRON SUCROSE 300 MG: 20 INJECTION, SOLUTION INTRAVENOUS at 08:30

## 2022-02-03 RX ADMIN — FAMOTIDINE 20 MG: 20 TABLET, FILM COATED ORAL at 08:02

## 2022-02-03 RX ADMIN — DIPHENHYDRAMINE HYDROCHLORIDE 25 MG: 25 CAPSULE ORAL at 08:02

## 2022-02-03 RX ADMIN — ACETAMINOPHEN 650 MG: 325 TABLET, FILM COATED ORAL at 08:02

## 2022-02-09 LAB — EXTERNAL GROUP B STREP ANTIGEN: NORMAL

## 2022-03-03 ENCOUNTER — HOSPITAL ENCOUNTER (OUTPATIENT)
Dept: LABOR AND DELIVERY | Facility: HOSPITAL | Age: 27
Discharge: HOME | End: 2022-03-03

## 2022-03-03 ENCOUNTER — HOSPITAL ENCOUNTER (INPATIENT)
Facility: HOSPITAL | Age: 27
LOS: 2 days | Discharge: HOME | End: 2022-03-05
Attending: OBSTETRICS & GYNECOLOGY | Admitting: OBSTETRICS & GYNECOLOGY

## 2022-03-03 ENCOUNTER — ANESTHESIA (OUTPATIENT)
Dept: LABOR AND DELIVERY | Facility: HOSPITAL | Age: 27
End: 2022-03-03

## 2022-03-03 ENCOUNTER — ANESTHESIA EVENT (OUTPATIENT)
Dept: LABOR AND DELIVERY | Facility: HOSPITAL | Age: 27
End: 2022-03-03

## 2022-03-03 LAB
ABO GROUP BLD: NORMAL
ABO GROUP BLD: NORMAL
BASOPHILS # BLD AUTO: 0.03 10*3/MM3 (ref 0–0.2)
BASOPHILS NFR BLD AUTO: 0.4 % (ref 0–1.5)
BLD GP AB SCN SERPL QL: POSITIVE
DEPRECATED RDW RBC AUTO: 55.6 FL (ref 37–54)
EOSINOPHIL # BLD AUTO: 0.05 10*3/MM3 (ref 0–0.4)
EOSINOPHIL NFR BLD AUTO: 0.6 % (ref 0.3–6.2)
ERYTHROCYTE [DISTWIDTH] IN BLOOD BY AUTOMATED COUNT: 17.3 % (ref 12.3–15.4)
HCT VFR BLD AUTO: 33.3 % (ref 34–46.6)
HGB BLD-MCNC: 10.3 G/DL (ref 12–15.9)
IMM GRANULOCYTES # BLD AUTO: 0.05 10*3/MM3 (ref 0–0.05)
IMM GRANULOCYTES NFR BLD AUTO: 0.6 % (ref 0–0.5)
LYMPHOCYTES # BLD AUTO: 2.66 10*3/MM3 (ref 0.7–3.1)
LYMPHOCYTES NFR BLD AUTO: 33.8 % (ref 19.6–45.3)
MCH RBC QN AUTO: 27 PG (ref 26.6–33)
MCHC RBC AUTO-ENTMCNC: 30.9 G/DL (ref 31.5–35.7)
MCV RBC AUTO: 87.4 FL (ref 79–97)
MONOCYTES # BLD AUTO: 0.64 10*3/MM3 (ref 0.1–0.9)
MONOCYTES NFR BLD AUTO: 8.1 % (ref 5–12)
NEUTROPHILS NFR BLD AUTO: 4.45 10*3/MM3 (ref 1.7–7)
NEUTROPHILS NFR BLD AUTO: 56.5 % (ref 42.7–76)
NRBC BLD AUTO-RTO: 0 /100 WBC (ref 0–0.2)
NUMBER OF DOSES: NORMAL
PLATELET # BLD AUTO: 221 10*3/MM3 (ref 140–450)
PMV BLD AUTO: 11.1 FL (ref 6–12)
RBC # BLD AUTO: 3.81 10*6/MM3 (ref 3.77–5.28)
RESIDUAL RHIG DETECTED: NORMAL
RH BLD: NEGATIVE
RH BLD: NEGATIVE
SARS-COV-2 RNA PNL SPEC NAA+PROBE: NOT DETECTED
T&S EXPIRATION DATE: NORMAL
WBC NRBC COR # BLD: 7.88 10*3/MM3 (ref 3.4–10.8)

## 2022-03-03 PROCEDURE — 25010000002 FENTANYL CITRATE (PF) 2500 MCG/50ML SOLUTION: Performed by: ANESTHESIOLOGY

## 2022-03-03 PROCEDURE — 25010000002 ROPIVACAINE PER 1 MG: Performed by: ANESTHESIOLOGY

## 2022-03-03 PROCEDURE — C1755 CATHETER, INTRASPINAL: HCPCS | Performed by: ANESTHESIOLOGY

## 2022-03-03 PROCEDURE — 0HQ9XZZ REPAIR PERINEUM SKIN, EXTERNAL APPROACH: ICD-10-PCS | Performed by: OBSTETRICS & GYNECOLOGY

## 2022-03-03 PROCEDURE — 86900 BLOOD TYPING SEROLOGIC ABO: CPT | Performed by: OBSTETRICS & GYNECOLOGY

## 2022-03-03 PROCEDURE — 85025 COMPLETE CBC W/AUTO DIFF WBC: CPT | Performed by: OBSTETRICS & GYNECOLOGY

## 2022-03-03 PROCEDURE — 86901 BLOOD TYPING SEROLOGIC RH(D): CPT | Performed by: OBSTETRICS & GYNECOLOGY

## 2022-03-03 PROCEDURE — 86870 RBC ANTIBODY IDENTIFICATION: CPT | Performed by: OBSTETRICS & GYNECOLOGY

## 2022-03-03 PROCEDURE — 10907ZC DRAINAGE OF AMNIOTIC FLUID, THERAPEUTIC FROM PRODUCTS OF CONCEPTION, VIA NATURAL OR ARTIFICIAL OPENING: ICD-10-PCS | Performed by: OBSTETRICS & GYNECOLOGY

## 2022-03-03 PROCEDURE — 87635 SARS-COV-2 COVID-19 AMP PRB: CPT | Performed by: OBSTETRICS & GYNECOLOGY

## 2022-03-03 PROCEDURE — C1755 CATHETER, INTRASPINAL: HCPCS

## 2022-03-03 PROCEDURE — 85461 HEMOGLOBIN FETAL: CPT | Performed by: OBSTETRICS & GYNECOLOGY

## 2022-03-03 PROCEDURE — 86850 RBC ANTIBODY SCREEN: CPT | Performed by: OBSTETRICS & GYNECOLOGY

## 2022-03-03 RX ORDER — DIPHENHYDRAMINE HYDROCHLORIDE 50 MG/ML
12.5 INJECTION INTRAMUSCULAR; INTRAVENOUS EVERY 8 HOURS PRN
Status: DISCONTINUED | OUTPATIENT
Start: 2022-03-03 | End: 2022-03-03 | Stop reason: HOSPADM

## 2022-03-03 RX ORDER — MISOPROSTOL 200 UG/1
800 TABLET ORAL ONCE AS NEEDED
Status: DISCONTINUED | OUTPATIENT
Start: 2022-03-03 | End: 2022-03-03 | Stop reason: HOSPADM

## 2022-03-03 RX ORDER — MAGNESIUM CARB/ALUMINUM HYDROX 105-160MG
30 TABLET,CHEWABLE ORAL ONCE
Status: DISCONTINUED | OUTPATIENT
Start: 2022-03-03 | End: 2022-03-03 | Stop reason: HOSPADM

## 2022-03-03 RX ORDER — SODIUM CHLORIDE 0.9 % (FLUSH) 0.9 %
10 SYRINGE (ML) INJECTION EVERY 12 HOURS SCHEDULED
Status: DISCONTINUED | OUTPATIENT
Start: 2022-03-03 | End: 2022-03-03 | Stop reason: HOSPADM

## 2022-03-03 RX ORDER — OXYTOCIN-SODIUM CHLORIDE 0.9% IV SOLN 30 UNIT/500ML 30-0.9/5 UT/ML-%
250 SOLUTION INTRAVENOUS CONTINUOUS PRN
Status: ACTIVE | OUTPATIENT
Start: 2022-03-03 | End: 2022-03-03

## 2022-03-03 RX ORDER — ONDANSETRON 2 MG/ML
4 INJECTION INTRAMUSCULAR; INTRAVENOUS EVERY 6 HOURS PRN
Status: DISCONTINUED | OUTPATIENT
Start: 2022-03-03 | End: 2022-03-03 | Stop reason: HOSPADM

## 2022-03-03 RX ORDER — OXYTOCIN-SODIUM CHLORIDE 0.9% IV SOLN 30 UNIT/500ML 30-0.9/5 UT/ML-%
125 SOLUTION INTRAVENOUS CONTINUOUS PRN
Status: COMPLETED | OUTPATIENT
Start: 2022-03-03 | End: 2022-03-03

## 2022-03-03 RX ORDER — FAMOTIDINE 20 MG/1
20 TABLET, FILM COATED ORAL EVERY 12 HOURS SCHEDULED
Status: DISCONTINUED | OUTPATIENT
Start: 2022-03-03 | End: 2022-03-03 | Stop reason: HOSPADM

## 2022-03-03 RX ORDER — LIDOCAINE HYDROCHLORIDE 10 MG/ML
5 INJECTION, SOLUTION EPIDURAL; INFILTRATION; INTRACAUDAL; PERINEURAL AS NEEDED
Status: DISCONTINUED | OUTPATIENT
Start: 2022-03-03 | End: 2022-03-03 | Stop reason: HOSPADM

## 2022-03-03 RX ORDER — LIDOCAINE HYDROCHLORIDE AND EPINEPHRINE 15; 5 MG/ML; UG/ML
INJECTION, SOLUTION EPIDURAL AS NEEDED
Status: DISCONTINUED | OUTPATIENT
Start: 2022-03-03 | End: 2022-03-03 | Stop reason: SURG

## 2022-03-03 RX ORDER — SODIUM CHLORIDE 0.9 % (FLUSH) 0.9 %
10 SYRINGE (ML) INJECTION AS NEEDED
Status: DISCONTINUED | OUTPATIENT
Start: 2022-03-03 | End: 2022-03-03 | Stop reason: HOSPADM

## 2022-03-03 RX ORDER — OXYTOCIN-SODIUM CHLORIDE 0.9% IV SOLN 30 UNIT/500ML 30-0.9/5 UT/ML-%
999 SOLUTION INTRAVENOUS ONCE
Status: COMPLETED | OUTPATIENT
Start: 2022-03-03 | End: 2022-03-03

## 2022-03-03 RX ORDER — ONDANSETRON 4 MG/1
4 TABLET, FILM COATED ORAL EVERY 6 HOURS PRN
Status: DISCONTINUED | OUTPATIENT
Start: 2022-03-03 | End: 2022-03-03 | Stop reason: HOSPADM

## 2022-03-03 RX ORDER — CARBOPROST TROMETHAMINE 250 UG/ML
250 INJECTION, SOLUTION INTRAMUSCULAR
Status: DISCONTINUED | OUTPATIENT
Start: 2022-03-03 | End: 2022-03-03 | Stop reason: HOSPADM

## 2022-03-03 RX ORDER — PHYTONADIONE 1 MG/.5ML
INJECTION, EMULSION INTRAMUSCULAR; INTRAVENOUS; SUBCUTANEOUS
Status: ACTIVE
Start: 2022-03-03 | End: 2022-03-04

## 2022-03-03 RX ORDER — EPHEDRINE SULFATE 50 MG/ML
5 INJECTION, SOLUTION INTRAVENOUS AS NEEDED
Status: DISCONTINUED | OUTPATIENT
Start: 2022-03-03 | End: 2022-03-03 | Stop reason: HOSPADM

## 2022-03-03 RX ORDER — OXYTOCIN-SODIUM CHLORIDE 0.9% IV SOLN 30 UNIT/500ML 30-0.9/5 UT/ML-%
2-20 SOLUTION INTRAVENOUS
Status: DISCONTINUED | OUTPATIENT
Start: 2022-03-03 | End: 2022-03-03 | Stop reason: HOSPADM

## 2022-03-03 RX ORDER — ACETAMINOPHEN 325 MG/1
650 TABLET ORAL EVERY 4 HOURS PRN
Status: DISCONTINUED | OUTPATIENT
Start: 2022-03-03 | End: 2022-03-03 | Stop reason: HOSPADM

## 2022-03-03 RX ORDER — SODIUM CHLORIDE, SODIUM LACTATE, POTASSIUM CHLORIDE, CALCIUM CHLORIDE 600; 310; 30; 20 MG/100ML; MG/100ML; MG/100ML; MG/100ML
125 INJECTION, SOLUTION INTRAVENOUS CONTINUOUS
Status: DISCONTINUED | OUTPATIENT
Start: 2022-03-03 | End: 2022-03-04

## 2022-03-03 RX ORDER — FAMOTIDINE 10 MG/ML
20 INJECTION, SOLUTION INTRAVENOUS EVERY 12 HOURS SCHEDULED
Status: DISCONTINUED | OUTPATIENT
Start: 2022-03-03 | End: 2022-03-03 | Stop reason: HOSPADM

## 2022-03-03 RX ORDER — PNV NO.95/FERROUS FUM/FOLIC AC 28MG-0.8MG
TABLET ORAL
COMMUNITY
End: 2022-12-26

## 2022-03-03 RX ORDER — METHYLERGONOVINE MALEATE 0.2 MG/ML
200 INJECTION INTRAVENOUS ONCE AS NEEDED
Status: DISCONTINUED | OUTPATIENT
Start: 2022-03-03 | End: 2022-03-03 | Stop reason: HOSPADM

## 2022-03-03 RX ORDER — ERYTHROMYCIN 5 MG/G
OINTMENT OPHTHALMIC
Status: ACTIVE
Start: 2022-03-03 | End: 2022-03-04

## 2022-03-03 RX ORDER — IBUPROFEN 600 MG/1
600 TABLET ORAL EVERY 8 HOURS PRN
Status: DISCONTINUED | OUTPATIENT
Start: 2022-03-03 | End: 2022-03-05 | Stop reason: HOSPADM

## 2022-03-03 RX ORDER — FAMOTIDINE 10 MG/ML
20 INJECTION, SOLUTION INTRAVENOUS ONCE AS NEEDED
Status: DISCONTINUED | OUTPATIENT
Start: 2022-03-03 | End: 2022-03-03 | Stop reason: HOSPADM

## 2022-03-03 RX ORDER — ONDANSETRON 2 MG/ML
4 INJECTION INTRAMUSCULAR; INTRAVENOUS ONCE AS NEEDED
Status: DISCONTINUED | OUTPATIENT
Start: 2022-03-03 | End: 2022-03-03 | Stop reason: SDUPTHER

## 2022-03-03 RX ADMIN — OXYTOCIN 250 ML/HR: 10 INJECTION, SOLUTION INTRAMUSCULAR; INTRAVENOUS at 20:24

## 2022-03-03 RX ADMIN — ROPIVACAINE HYDROCHLORIDE: 2 INJECTION, SOLUTION EPIDURAL; INFILTRATION at 18:45

## 2022-03-03 RX ADMIN — IBUPROFEN 600 MG: 600 TABLET ORAL at 22:21

## 2022-03-03 RX ADMIN — SODIUM CHLORIDE, POTASSIUM CHLORIDE, SODIUM LACTATE AND CALCIUM CHLORIDE 125 ML/HR: 600; 310; 30; 20 INJECTION, SOLUTION INTRAVENOUS at 08:45

## 2022-03-03 RX ADMIN — ROPIVACAINE HYDROCHLORIDE 96 ML/HR: 2 INJECTION, SOLUTION EPIDURAL; INFILTRATION at 11:58

## 2022-03-03 RX ADMIN — OXYTOCIN 2 MILLI-UNITS/MIN: 10 INJECTION, SOLUTION INTRAMUSCULAR; INTRAVENOUS at 09:40

## 2022-03-03 RX ADMIN — OXYTOCIN 999 ML/HR: 10 INJECTION, SOLUTION INTRAMUSCULAR; INTRAVENOUS at 20:12

## 2022-03-03 RX ADMIN — OXYTOCIN 125 ML/HR: 10 INJECTION, SOLUTION INTRAMUSCULAR; INTRAVENOUS at 21:31

## 2022-03-03 RX ADMIN — LIDOCAINE HYDROCHLORIDE AND EPINEPHRINE 4 ML: 15; 5 INJECTION, SOLUTION EPIDURAL at 11:57

## 2022-03-03 NOTE — ANESTHESIA PROCEDURE NOTES
Labor Epidural      Patient reassessed immediately prior to procedure    Patient location during procedure: OB  Start Time: 3/3/2022 12:15 PM  Performed By  Anesthesiologist: Sher Cortes MD  Preanesthetic Checklist  Completed: patient identified, IV checked, site marked, risks and benefits discussed, surgical consent, monitors and equipment checked, pre-op evaluation and timeout performed  Prep:  Pt Position:sitting  Sterile Tech:gloves, mask and sterile barrier  Prep:chlorhexidine gluconate and isopropyl alcohol  Monitoring:blood pressure monitoring, continuous pulse oximetry and EKG  Epidural Block Procedure:  Approach:midline  Guidance:palpation technique  Location:L2-L3  Needle Type:Tuohy  Needle Gauge:17  Loss of Resistance Medium: saline  Loss of Resistance: 6cm  Cath Depth at skin:13 cm  Paresthesia: none  Aspiration:negative  Test Dose:negative  Number of Attempts: 1  Post Assessment:  Dressing:occlusive dressing applied and secured with tape  Pt Tolerance:patient tolerated the procedure well with no apparent complications

## 2022-03-03 NOTE — H&P
H&P reviewed. The patient was examined and there are no changes to the H&P.    Dai Barber MD  3/3/2022  11:33 EST

## 2022-03-03 NOTE — ANESTHESIA PREPROCEDURE EVALUATION
Anesthesia Evaluation     Patient summary reviewed and Nursing notes reviewed   no history of anesthetic complications:  NPO Solid Status: > 2 hours             Airway   Mallampati: III  TM distance: >3 FB  Neck ROM: full  Dental      Pulmonary - normal exam   (-) not a smoker  Cardiovascular - normal exam    (-) angina      Neuro/Psych  GI/Hepatic/Renal/Endo    (+) obesity,       Musculoskeletal     Abdominal    Substance History      OB/GYN    (+) Pregnant (39W 0D),         Other          Other Comment: Blood sample misplaced on admission  No history of abnormalities                   Anesthesia Plan    ASA 2     epidural       Anesthetic plan, all risks, benefits, and alternatives have been provided, discussed and informed consent has been obtained with: patient.        CODE STATUS:

## 2022-03-04 LAB
BASOPHILS # BLD AUTO: 0.02 10*3/MM3 (ref 0–0.2)
BASOPHILS NFR BLD AUTO: 0.2 % (ref 0–1.5)
DEPRECATED RDW RBC AUTO: 53.3 FL (ref 37–54)
EOSINOPHIL # BLD AUTO: 0.04 10*3/MM3 (ref 0–0.4)
EOSINOPHIL NFR BLD AUTO: 0.5 % (ref 0.3–6.2)
ERYTHROCYTE [DISTWIDTH] IN BLOOD BY AUTOMATED COUNT: 17.3 % (ref 12.3–15.4)
FETAL BLEED: NEGATIVE
HCT VFR BLD AUTO: 28.9 % (ref 34–46.6)
HGB BLD-MCNC: 9.2 G/DL (ref 12–15.9)
IMM GRANULOCYTES # BLD AUTO: 0.04 10*3/MM3 (ref 0–0.05)
IMM GRANULOCYTES NFR BLD AUTO: 0.5 % (ref 0–0.5)
LYMPHOCYTES # BLD AUTO: 2.07 10*3/MM3 (ref 0.7–3.1)
LYMPHOCYTES NFR BLD AUTO: 25.7 % (ref 19.6–45.3)
MCH RBC QN AUTO: 26.8 PG (ref 26.6–33)
MCHC RBC AUTO-ENTMCNC: 31.8 G/DL (ref 31.5–35.7)
MCV RBC AUTO: 84.3 FL (ref 79–97)
MONOCYTES # BLD AUTO: 0.63 10*3/MM3 (ref 0.1–0.9)
MONOCYTES NFR BLD AUTO: 7.8 % (ref 5–12)
NEUTROPHILS NFR BLD AUTO: 5.27 10*3/MM3 (ref 1.7–7)
NEUTROPHILS NFR BLD AUTO: 65.3 % (ref 42.7–76)
NRBC BLD AUTO-RTO: 0 /100 WBC (ref 0–0.2)
PLATELET # BLD AUTO: 191 10*3/MM3 (ref 140–450)
PMV BLD AUTO: 11.3 FL (ref 6–12)
RBC # BLD AUTO: 3.43 10*6/MM3 (ref 3.77–5.28)
WBC NRBC COR # BLD: 8.07 10*3/MM3 (ref 3.4–10.8)

## 2022-03-04 PROCEDURE — 25010000002 RHO D IMMUNE GLOBULIN 1500 UNIT/2ML SOLUTION PREFILLED SYRINGE: Performed by: OBSTETRICS & GYNECOLOGY

## 2022-03-04 PROCEDURE — 85025 COMPLETE CBC W/AUTO DIFF WBC: CPT | Performed by: OBSTETRICS & GYNECOLOGY

## 2022-03-04 RX ORDER — OXYCODONE HYDROCHLORIDE AND ACETAMINOPHEN 5; 325 MG/1; MG/1
1 TABLET ORAL EVERY 4 HOURS PRN
Status: DISCONTINUED | OUTPATIENT
Start: 2022-03-04 | End: 2022-03-05 | Stop reason: HOSPADM

## 2022-03-04 RX ORDER — ONDANSETRON 4 MG/1
4 TABLET, FILM COATED ORAL EVERY 8 HOURS PRN
Status: DISCONTINUED | OUTPATIENT
Start: 2022-03-04 | End: 2022-03-05 | Stop reason: HOSPADM

## 2022-03-04 RX ORDER — DIPHENHYDRAMINE HCL 25 MG
25 CAPSULE ORAL NIGHTLY PRN
Status: DISCONTINUED | OUTPATIENT
Start: 2022-03-04 | End: 2022-03-05 | Stop reason: HOSPADM

## 2022-03-04 RX ORDER — HYDROCORTISONE 25 MG/G
1 CREAM TOPICAL AS NEEDED
Status: DISCONTINUED | OUTPATIENT
Start: 2022-03-04 | End: 2022-03-05 | Stop reason: HOSPADM

## 2022-03-04 RX ORDER — DOCUSATE SODIUM 100 MG/1
100 CAPSULE, LIQUID FILLED ORAL 2 TIMES DAILY
Status: DISCONTINUED | OUTPATIENT
Start: 2022-03-04 | End: 2022-03-05 | Stop reason: HOSPADM

## 2022-03-04 RX ORDER — BISACODYL 10 MG
10 SUPPOSITORY, RECTAL RECTAL DAILY PRN
Status: DISCONTINUED | OUTPATIENT
Start: 2022-03-04 | End: 2022-03-05 | Stop reason: HOSPADM

## 2022-03-04 RX ORDER — SODIUM CHLORIDE 0.9 % (FLUSH) 0.9 %
1-10 SYRINGE (ML) INJECTION AS NEEDED
Status: DISCONTINUED | OUTPATIENT
Start: 2022-03-04 | End: 2022-03-05 | Stop reason: HOSPADM

## 2022-03-04 RX ADMIN — Medication: at 06:06

## 2022-03-04 RX ADMIN — DOCUSATE SODIUM 100 MG: 100 CAPSULE, LIQUID FILLED ORAL at 20:11

## 2022-03-04 RX ADMIN — IBUPROFEN 600 MG: 600 TABLET ORAL at 14:10

## 2022-03-04 RX ADMIN — IBUPROFEN 600 MG: 600 TABLET ORAL at 06:06

## 2022-03-04 RX ADMIN — HUMAN RHO(D) IMMUNE GLOBULIN 1500 UNITS: 1500 SOLUTION INTRAMUSCULAR; INTRAVENOUS at 06:06

## 2022-03-04 RX ADMIN — DOCUSATE SODIUM 100 MG: 100 CAPSULE, LIQUID FILLED ORAL at 07:59

## 2022-03-04 NOTE — PLAN OF CARE
Goal Outcome Evaluation:  Plan of Care Reviewed With: patient        Progress: improving  Outcome Summary: pt resting skin to skin with infant, pt had a vaginal delivery and delivered a healthy baby girl with no complications, bleeding well controlled, VSS, will continue to monitor

## 2022-03-04 NOTE — PROGRESS NOTES
[facility]  Vaginal Delivery Progress Note    Patient Name: Regine Martins  :  1995  MRN:  [id number]      Subjective   Postpartum Day 1: Vaginal Delivery of a female infant.     The patient feels well without complaints.  Her pain is well controlled.  Reports normal lochia.     The patient plans to bottle feed.    Objective     Vital Signs Range for the last 24 hours  Temperature: Temp:  [97.6 °F (36.4 °C)-99 °F (37.2 °C)] 98.1 °F (36.7 °C)       BP: BP: ()/() 108/85   Pulse: Heart Rate:  [] 69   Respirations: Resp:  [16-18] 16                       Physical Exam:  General: Awake and alert  Abdomen: Fundus: firm, non tender, 1 below umbilicus  Extremities:  trace edema, NT     Labs:     Results from last 7 days   Lab Units 22  0811 22  1219   WBC 10*3/mm3 8.07 7.88   HEMOGLOBIN g/dL 9.2* 10.3*   HEMATOCRIT % 28.9* 33.3*   PLATELETS 10*3/mm3 191 221       Prenatal labs results reviewed:  Yes   Rubella:  Immune  Rh Status:    RH type   Date Value Ref Range Status   2022 Negative  Final     Comment:     RhIG IS Indicated. Baby is Rh Positive         Assessment/Plan  : 1. PPD1 S/P  - Doing well, continue usual cares.     Anemia-- not lightheaded or dizzy        Pregnancy          Cat Chao, APRN  3/4/2022  09:06 EST

## 2022-03-04 NOTE — PLAN OF CARE
Goal Outcome Evaluation:  Plan of Care Reviewed With: patient, mother        Progress: improving  Outcome Summary: 0PP, VSS, ambulating well, voiding spontaneously, pain controlled with PO ibuprofen, formula feeding infant

## 2022-03-04 NOTE — ANESTHESIA POSTPROCEDURE EVALUATION
Patient: Regine Martins    Procedure Summary     Date: 03/03/22 Room / Location:     Anesthesia Start: 1150 Anesthesia Stop: 2010    Procedure: LABOR ANALGESIA Diagnosis:     Scheduled Providers:  Provider: Gio Jaquez MD    Anesthesia Type: epidural ASA Status: 2          Anesthesia Type: epidural    Vitals  Vitals Value Taken Time   /71 03/03/22 2046   Temp 37 °C (98.6 °F) 03/03/22 1922   Pulse 68 03/03/22 2046   Resp 18 03/03/22 1922   SpO2 99 % 03/03/22 2009   Vitals shown include unvalidated device data.        Post Anesthesia Care and Evaluation    Patient participation: complete - patient cannot participate  Anesthetic complications: No anesthetic complications    Cardiovascular status: acceptable  Respiratory status: acceptable  Hydration status: acceptable    Comments: Patient was discharged prior to being evaluated by Anesthesia...per chart review, no anesthetic complications were noted.  NOT MEDICALLY DIRECTED  /100   Pulse (!) 176   Temp 37 °C (98.6 °F) (Oral)   Resp 18   Wt 108 kg (239 lb)   SpO2 100%   BMI 37.42 kg/m²

## 2022-03-04 NOTE — L&D DELIVERY NOTE
[facility]  Vaginal Delivery Note    Regine Martins26 y.o.  at 39w0d    Induction: Oxytocin   Induction indication: Elective     Labor onset:3/3/2022  5:29 PM   Dilation complete: 3/3/2022  7:30 PM   Beginning of second stage: 3/3/2022  8:04 PM     Antibiotics received during labor: No     Delivery     Delivery: Vaginal, Spontaneous     YOB: 2023    Time of Birth:  Labor complications:  8:10 PM   None .   Anesthesia: Epidural     Delivering clinician: Dai Barber MD   Additional complications:      Infant    Findings: Viable female  infant    Infant observations: Weight: 3402 g (7 lb 8 oz)    Observations/Comments:  scale 4      Apgars: 8  @ 1 minute /    9  @ 5 minutes                                       Quantitative Blood Loss: Quantitative Blood Loss (mL): 137 mL       Delivery narrative: The patient is a 26 y.o.  at 39w0d.  Presented for IOL.  Membrane rupture/fluid: artificial rupture of membranes  at 2:02 PM  on 3/3/2022  Clear  Progressed normally to complete at 7:30 PM . Labored down until 3/3/2022  8:04 PM . Fetal status reassuring throughout.   of viable  infant.  8:10 PM . Nuchal cord x0.  Both shoulders delivery easily. Apgars 8  @ 1 minute /9  @ 5 minutes. Weight: 3402 g (7 lb 8 oz) . Spontaneous delivery of an intact placenta with a 3-vessel cord. Cervix and rectum intact. 1st degree laceration repaired in usual fashion with 3-0 chromic suture. Mother and baby well bonded and recovering well.             Dai Barber MD  22  00:05 EST

## 2022-03-04 NOTE — LACTATION NOTE
This note was copied from a baby's chart.  Mom plans to formula feed only, discussed milk suppression.

## 2022-03-05 VITALS
HEART RATE: 66 BPM | TEMPERATURE: 97.8 F | OXYGEN SATURATION: 99 % | SYSTOLIC BLOOD PRESSURE: 117 MMHG | RESPIRATION RATE: 16 BRPM | DIASTOLIC BLOOD PRESSURE: 78 MMHG | BODY MASS INDEX: 37.42 KG/M2 | WEIGHT: 239 LBS

## 2022-03-05 RX ORDER — IBUPROFEN 600 MG/1
600 TABLET ORAL EVERY 8 HOURS PRN
Qty: 30 TABLET | Refills: 3 | Status: SHIPPED | OUTPATIENT
Start: 2022-03-05 | End: 2022-12-26

## 2022-03-05 RX ADMIN — IBUPROFEN 600 MG: 600 TABLET ORAL at 11:25

## 2022-03-05 RX ADMIN — IBUPROFEN 600 MG: 600 TABLET ORAL at 03:04

## 2022-03-05 RX ADMIN — DOCUSATE SODIUM 100 MG: 100 CAPSULE, LIQUID FILLED ORAL at 11:25

## 2022-03-05 NOTE — EXTERNAL PATIENT INSTRUCTIONS
Patient Education   Table of Contents       Postpartum Baby Blues       Postpartum Care After Vaginal Delivery       Postpartum Emotions     To view videos and all your education online visit,   [email or web address]   or scan this QR code with your smartphone.                  Postpartum Baby Blues     The postpartum period begins right after the birth of a baby. During this time, there is often cinda and excitement. It is also a time of many changes in the life of the parents. A mother may feel happy one minute and sad or stressed the next. These feelings of sadness, called the baby blues, usually happen in the period right after the baby is born and go away within a week or two.   What are the causes?   The exact cause of this condition is not known. Changes in hormone levels after childbirth are believed to trigger some of the symptoms.    Other factors that can play a role in these mood changes include:       Lack of sleep.       Stressful life events, such as financial problems, caring for a loved one, or death of a loved one.       Genetics.     What are the signs or symptoms?    Symptoms of this condition include:       Changes in mood, such as going from extreme happiness to sadness.       A decrease in concentration.       Difficulty sleeping.       Crying spells and tearfulness.       Loss of appetite.       Irritability.       Anxiety.     If these symptoms last for more than 2 weeks or become more severe, you may have postpartum depression.   How is this diagnosed?   This condition is diagnosed based on an evaluation of your symptoms. Your health care provider may use a screening tool that includes a list of questions to help identify a person with the baby blues or postpartum depression.   How is this treated?   The baby blues usually go away on their own in 1?2 weeks. Social support is often what is needed. You will be encouraged to get adequate sleep and rest.   Follow these instructions at  home:   Lifestyle               Get as much rest as you can. Take a nap when the baby sleeps.       Exercise regularly as told by your health care provider. Some women find yoga and walking to be helpful.       Eat a balanced and nourishing diet. This includes plenty of fruits and vegetables, whole grains, and lean proteins.       Do little things that you enjoy. Take a bubble bath, read your favorite magazine, or listen to your favorite music.       Avoid alcohol.       Ask for help with household chores, cooking, grocery shopping, or running errands. Do not  try to do everything yourself. Consider hiring a postpartum  to help. This is a professional who specializes in providing support to new mothers.       Try not to make any major life changes during pregnancy or right after giving birth. This can add stress.       General instructions         Talk to people close to you about how you are feeling. Get support from your partner, family members, friends, or other new moms. You may want to join a support group.      Find ways to manage stress. This may include:       Writing your thoughts and feelings in a journal.       Spending time outside.       Spending time with people who make you laugh.       Try to stay positive in how you think. Think about the things you are grateful for.       Take over-the-counter and prescription medicines only as told by your health care provider.       Let your health care provider know if you have any concerns.       Keep all postpartum visits. This is important.       Contact a health care provider if:         Your baby blues do not go away after 2 weeks.     Get help right away if:         You have thoughts of taking your own life (suicidal thoughts), or of harming your baby or someone else.       You see or hear things that are not there (hallucinations).     If you ever feel like you may hurt yourself or others, or have thoughts about taking your own life, get help right  away. Go to your nearest emergency department or:      Call your local emergency services (601 in the [place]      Call a suicide crisis helpline, such as the National Suicide Prevention Lifeline, at 1-907.302.3243. This is open 24 hours a day in the [place]      Text the Crisis Text Line at 282635 (in the [place]     Summary         After giving birth, you may feel happy one minute and sad or stressed the next. Feelings of sadness that happen right after the baby is born and go away after a week or two are called the baby blues.       You can manage the baby blues by getting enough rest, eating a healthy diet, exercising, spending time with supportive people, and finding ways to manage stress.       If feelings of sadness and stress last longer than 2 weeks or get in the way of caring for your baby, talk with your health care provider. This may mean you have postpartum depression.     This information is not intended to replace advice given to you by your health care provider. Make sure you discuss any questions you have with your health care provider.     Document Released: 09/21/2005Document Revised: 06/11/2021Document Reviewed: 06/11/2021     [employer] Patient Education ? 2021 [employer]         Postpartum Care After Vaginal Delivery     The following information offers guidance about how to care for yourself from the time you deliver your baby to 6?12 weeks after delivery (postpartum period). If you have problems or questions, contact your health care provider for more specific instructions.   Follow these instructions at home:   Vaginal bleeding        It is normal to have vaginal bleeding (lochia) after delivery. Wear a sanitary pad for bleeding and discharge.       During the first week after delivery, the amount and appearance of lochia is often similar to a menstrual period.       Over the next few weeks, it will gradually decrease to a dry, yellow-brown discharge.       For most women, lochia stops  completely by 4?6 weeks after delivery, but can vary.      Change your sanitary pads frequently. Watch for any changes in your flow, such as:       A sudden increase in volume.       A change in color.      Large blood clots.       If you pass a blood clot from your vagina, save it and call your health care provider. Do not  flush blood clots down the toilet before talking with your health care provider.      Do not  use tampons or douches until your health care provider approves.       If you are not breastfeeding, your period should return 6?8 weeks after delivery. If you are feeding your baby breast milk only, your period may not return until you stop breastfeeding.     Perineal care            Keep the area between the vagina and the anus (perineum) clean and dry. Use medicated pads and pain-relieving sprays and creams as directed.      If you had a surgical cut in the perineum (episiotomy) or a tear, check the area for signs of infection until you are healed. Check for:       More redness, swelling, or pain.       Fluid or blood coming from the cut or tear.       Warmth.       Pus or a bad smell.       You may be given a squirt bottle to use instead of wiping to clean the perineum area after you use the bathroom. Pat the area gently to dry it.       To relieve pain caused by an episiotomy, a tear, or swollen veins in the anus (hemorrhoids), take a warm sitz bath 2?3 times a day. In a sitz bath, the warm water should only come up to your hips and cover your buttocks.     Breast care         In the first few days after delivery, your breasts may feel heavy, full, and uncomfortable (breast engorgement). Milk may also leak from your breasts. Ask your health care provider about ways to help relieve the discomfort.      If you are breastfeeding:       Wear a bra that supports your breasts and fits well. Use breast pads to absorb milk that leaks.       Keep your nipples clean and dry. Apply creams and ointments as  told.       You may have uterine contractions every time you breastfeed for up to several weeks after delivery. This helps your uterus return to its normal size.       If you have any problems with breastfeeding, notify your health care provider or lactation consultant.      If you are not breastfeeding:       Avoid touching your breasts. Do not  squeeze out (express) milk. Doing this can make your breasts produce more milk.       Wear a good-fitting bra and use cold packs to help with swelling.     Intimacy and sexuality        Ask your health care provider when you can engage in sexual activity. This may depend upon:       Your risk of infection.       How fast you are healing.       Your comfort and desire to engage in sexual activity.       You are able to get pregnant after delivery, even if you have not had your period. Talk with your health care provider about methods of birth control (contraception) or family planning if you desire future pregnancies.     Medicines         Take over-the-counter and prescription medicines only as told by your health care provider.       Take an over-the-counter stool softener to help ease bowel movements as told by your health care provider.       If you were prescribed an antibiotic medicine, take it as told by your health care provider. Do not  stop taking the antibiotic even if you start to feel better.       Review all previous and current prescriptions to check for possible transfer into breast milk.     Activity         Gradually return to your normal activities as told by your health care provider.       Rest as much as possible. Nap while your baby is sleeping.     Eating and drinking            Drink enough fluid to keep your urine pale yellow.       To help prevent or relieve constipation, eat high-fiber foods every day.       Choose healthy eating to support breastfeeding or weight loss goals.       Take your prenatal vitamins until your health care provider tells  you to stop.         General tips/recommendations        Do not  use any products that contain nicotine or tobacco. These products include cigarettes, chewing tobacco, and vaping devices, such as e-cigarettes. If you need help quitting, ask your health care provider.      Do not  drink alcohol, especially if you are breastfeeding.      Do not  take medications or drugs that are not prescribed to you, especially if you are breastfeeding.       Visit your health care provider for a postpartum checkup within the first 3?6 weeks after delivery.       Complete a comprehensive postpartum visit no later than 12 weeks after delivery.       Keep all follow-up visits for you and your baby.     Contact a health care provider if:         You feel unusually sad or worried.       Your breasts become red, painful, or hard.       You have a fever or other signs of an infection.       You have bleeding that is soaking through one pad an hour or you have blood clots.       You have a severe headache that doesn't go away or you have vision changes.       You have nausea and vomiting and are unable to eat or drink anything for 24 hours.     Get help right away if:         You have chest pain or difficulty breathing.       You have sudden, severe leg pain.       You faint or have a seizure.       You have thoughts about hurting yourself or your baby.     If you ever feel like you may hurt yourself or others, or have thoughts about taking your own life, get help right away. Go to your nearest emergency department or:      Call your local emergency services (911 in the [place]      The National Suicide Prevention Lifeline at 1-843.976.5100. This suicide crisis helpline is open 24 hours a day.      Text the Crisis Text Line at 309350 (in the [place]     Summary         The period of time after you deliver your  up to 6?12 weeks after delivery is called the postpartum period.       Keep all follow-up visits for you and your baby.        Review all previous and current prescriptions to check for possible transfer into breast milk.       Contact a health care provider if you feel unusually sad or worried during the postpartum period.     This information is not intended to replace advice given to you by your health care provider. Make sure you discuss any questions you have with your health care provider.     Document Released: 10/14/2008Document Revised: 09/02/2021Document Reviewed: 09/02/2021     [employer] Patient Education ? 2021 [employer]        pt states she takes asa pt states she takes asa  Power of  is going to call me the medication   if Admission medrec is not complete primary team to call Ms Ascencio to confirm home medication

## 2022-03-05 NOTE — DISCHARGE SUMMARY
Date of Discharge:  3/5/2022    Discharge Diagnosis: s/p     Presenting Problem/History of Present Illness  Pregnancy [Z34.90]       Hospital Course  Patient is a 26 y.o. female presented with vaginal delivery of a female infant by Dr Barber. Pt with no PP complications feels good and ready for d/c.      Procedures Performed         Consults:   Consults     No orders found from 2022 to 3/4/2022.          Condition on Discharge:   Subjective   Postpartum Day 2 Vaginal Delivery.    The patient feels well without complaints.    Vital Signs  Temp:  [97.8 °F (36.6 °C)-98.2 °F (36.8 °C)] 97.8 °F (36.6 °C)  Heart Rate:  [66-69] 66  Resp:  [16] 16  BP: (101-117)/(66-78) 117/78    Physical Exam:   General: Awake and alert   Abdomen: Fundus: firm, non tender    Extremities:  Calves NT bilaterally    Assessment/Plan     PPD2  S/P  -   Stable for discharge. Instructions reviewed.  PP anemia- will continue po iron      Discharge Disposition  Home or Self Care    Discharge Medications     Discharge Medications      Continue These Medications      Instructions Start Date   ferrous sulfate 325 (65 Fe) MG tablet   No dose, route, or frequency recorded.      ibuprofen 600 MG tablet  Commonly known as: ADVIL,MOTRIN   600 mg, Oral, Every 8 Hours PRN      prenatal (CLASSIC) vitamin  tablet  Generic drug: prenatal vitamin   1 tablet, Oral, Daily      valACYclovir 500 MG tablet  Commonly known as: VALTREX   500 mg, Oral, 2 Times Daily               The patient has been prescribed a controlled substance.  She has been counseled on the risks associated with using the medication.   The addictive potential of this medication and alternatives were discussed carefully with this patient and she demonstrated understanding.  A [facility] report has been obtained and reviewed.       Activity at Discharge: restrictions reviewed    Follow-up Appointments  No future appointments.  Additional Instructions for the Follow-ups that You Need to  Schedule     Call MD With Problems / Concerns   As directed      Call if increased bleeding, pain or fever    Order Comments: Call if increased bleeding, pain or fever                Test Results Pending at Discharge       ELISHA Ba  03/05/22  09:05 EST

## 2022-04-01 ENCOUNTER — APPOINTMENT (OUTPATIENT)
Dept: ULTRASOUND IMAGING | Facility: HOSPITAL | Age: 27
End: 2022-04-01

## 2022-04-01 ENCOUNTER — HOSPITAL ENCOUNTER (EMERGENCY)
Facility: HOSPITAL | Age: 27
Discharge: HOME | End: 2022-04-01
Attending: EMERGENCY MEDICINE | Admitting: EMERGENCY MEDICINE

## 2022-04-01 VITALS
TEMPERATURE: 97.6 F | RESPIRATION RATE: 16 BRPM | SYSTOLIC BLOOD PRESSURE: 128 MMHG | HEART RATE: 86 BPM | OXYGEN SATURATION: 98 % | DIASTOLIC BLOOD PRESSURE: 88 MMHG

## 2022-04-01 LAB
ALBUMIN SERPL-MCNC: 4.8 G/DL (ref 3.5–5.2)
ALBUMIN/GLOB SERPL: 1.5 G/DL
ALP SERPL-CCNC: 102 U/L (ref 39–117)
ALT SERPL W P-5'-P-CCNC: 19 U/L (ref 1–33)
ANION GAP SERPL CALCULATED.3IONS-SCNC: 12 MMOL/L (ref 5–15)
AST SERPL-CCNC: 34 U/L (ref 1–32)
BASOPHILS # BLD AUTO: 0.04 10*3/MM3 (ref 0–0.2)
BASOPHILS NFR BLD AUTO: 0.5 % (ref 0–1.5)
BILIRUB SERPL-MCNC: 0.8 MG/DL (ref 0–1.2)
BUN SERPL-MCNC: 4 MG/DL (ref 6–20)
BUN/CREAT SERPL: 4.8 (ref 7–25)
CALCIUM SPEC-SCNC: 9.3 MG/DL (ref 8.6–10.5)
CHLORIDE SERPL-SCNC: 101 MMOL/L (ref 98–107)
CO2 SERPL-SCNC: 24 MMOL/L (ref 22–29)
CREAT SERPL-MCNC: 0.84 MG/DL (ref 0.57–1)
DEPRECATED RDW RBC AUTO: 45.7 FL (ref 37–54)
EGFRCR SERPLBLD CKD-EPI 2021: 97.8 ML/MIN/1.73
EOSINOPHIL # BLD AUTO: 0.06 10*3/MM3 (ref 0–0.4)
EOSINOPHIL NFR BLD AUTO: 0.8 % (ref 0.3–6.2)
ERYTHROCYTE [DISTWIDTH] IN BLOOD BY AUTOMATED COUNT: 15.1 % (ref 12.3–15.4)
GLOBULIN UR ELPH-MCNC: 3.2 GM/DL
GLUCOSE SERPL-MCNC: 87 MG/DL (ref 65–99)
HCT VFR BLD AUTO: 40.5 % (ref 34–46.6)
HGB BLD-MCNC: 13.5 G/DL (ref 12–15.9)
IMM GRANULOCYTES # BLD AUTO: 0.02 10*3/MM3 (ref 0–0.05)
IMM GRANULOCYTES NFR BLD AUTO: 0.3 % (ref 0–0.5)
LYMPHOCYTES # BLD AUTO: 3.22 10*3/MM3 (ref 0.7–3.1)
LYMPHOCYTES NFR BLD AUTO: 40.6 % (ref 19.6–45.3)
MCH RBC QN AUTO: 27.8 PG (ref 26.6–33)
MCHC RBC AUTO-ENTMCNC: 33.3 G/DL (ref 31.5–35.7)
MCV RBC AUTO: 83.5 FL (ref 79–97)
MONOCYTES # BLD AUTO: 0.48 10*3/MM3 (ref 0.1–0.9)
MONOCYTES NFR BLD AUTO: 6 % (ref 5–12)
NEUTROPHILS NFR BLD AUTO: 4.12 10*3/MM3 (ref 1.7–7)
NEUTROPHILS NFR BLD AUTO: 51.8 % (ref 42.7–76)
NRBC BLD AUTO-RTO: 0 /100 WBC (ref 0–0.2)
PLATELET # BLD AUTO: 235 10*3/MM3 (ref 140–450)
PMV BLD AUTO: 12.1 FL (ref 6–12)
POTASSIUM SERPL-SCNC: 4.8 MMOL/L (ref 3.5–5.2)
PROT SERPL-MCNC: 8 G/DL (ref 6–8.5)
RBC # BLD AUTO: 4.85 10*6/MM3 (ref 3.77–5.28)
SODIUM SERPL-SCNC: 137 MMOL/L (ref 136–145)
WBC NRBC COR # BLD: 7.94 10*3/MM3 (ref 3.4–10.8)

## 2022-04-01 PROCEDURE — 76856 US EXAM PELVIC COMPLETE: CPT

## 2022-04-01 PROCEDURE — 76830 TRANSVAGINAL US NON-OB: CPT

## 2022-04-01 PROCEDURE — 80053 COMPREHEN METABOLIC PANEL: CPT | Performed by: NURSE PRACTITIONER

## 2022-04-01 PROCEDURE — 93976 VASCULAR STUDY: CPT

## 2022-04-01 PROCEDURE — 99283 EMERGENCY DEPT VISIT LOW MDM: CPT

## 2022-04-01 PROCEDURE — 85025 COMPLETE CBC W/AUTO DIFF WBC: CPT | Performed by: NURSE PRACTITIONER

## 2022-04-01 NOTE — ED TRIAGE NOTES
Gave birth march 3 and has had heavy bleeding today.  Changing pad every 30 minutes    Patient was placed in face mask during first look triage.  Patient was wearing a face mask throughout encounter.  I wore personal protective equipment throughout the encounter.  Hand hygiene was performed before and after patient encounter.

## 2022-04-01 NOTE — ED PROVIDER NOTES
EMERGENCY DEPARTMENT ENCOUNTER    Room Number:    Date of encounter:  2022  PCP: Provider, No Known  Historian: Patient      PPE    Patient was placed in face mask in first look. Patient was wearing facemask when I entered the room and throughout our encounter. I wore full protective equipment throughout this patient encounter including a face mask, and gloves. Hand hygiene was performed before donning protective equipment and after removal when leaving the room.        HPI:  Chief Complaint: Vaginal bleeding  A complete HPI/ROS/PMH/PSH/SH/FH are unobtainable due to: Nothing    Context: Regine Martins is a 26 y.o. female who arrives to the ED via private vehicle.  Patient presents with c/o moderate, heavy vaginal bleeding since .  Patient states she had a vaginal delivery on March 3,  2 para 2, the bleeding stopped 2 weeks after she got home.  She states that on the  it started again and has been heavy and she has been passing clots since then.  She states that she had a little dizziness this morning along with a mild headache both of which have resolved.  Patient denies fever, chills, nausea, vomiting, chest pain, shortness of breath, abdominal pain, dysuria or any other symptoms.  Patient states that nothing makes the symptoms better and nothing worsens symptoms.  Patient states she is not breast-feeding.        PAST MEDICAL HISTORY  Active Ambulatory Problems     Diagnosis Date Noted   • Pregnancy 2018   • Anemia 2018   • Acute hypoxemic respiratory failure due to COVID-19 (HCC) 2021   • Cytokine release syndrome, grade 2 2021     Resolved Ambulatory Problems     Diagnosis Date Noted   • Hypokalemia 2021   • Diarrhea 2021     Past Medical History:   Diagnosis Date   • Abnormal Pap smear of cervix    • HSV-2 infection    • Migraine          PAST SURGICAL HISTORY  Past Surgical History:   Procedure Laterality Date   • FOOT SURGERY      Per pt, to  remove bones; had 2 procedures         FAMILY HISTORY  No family history on file.      SOCIAL HISTORY  Social History     Socioeconomic History   • Marital status: Single   Tobacco Use   • Smoking status: Never Smoker   • Smokeless tobacco: Never Used   Vaping Use   • Vaping Use: Never used   Substance and Sexual Activity   • Alcohol use: No   • Drug use: No   • Sexual activity: Defer         ALLERGIES  Patient has no known allergies.        REVIEW OF SYSTEMS  Review of Systems     All systems reviewed and negative except for those discussed in HPI.        PHYSICAL EXAM    ED Triage Vitals   Temp Heart Rate Resp BP SpO2   04/01/22 1731 04/01/22 1731 04/01/22 1731 04/01/22 1800 04/01/22 1731   97.6 °F (36.4 °C) 114 16 137/94 100 %         Physical Exam  GENERAL: Well appearing, nontoxic appearing, not distressed  HENT: normocephalic, atraumatic  EYES: no scleral icterus, PERRL  CV: regular rhythm, regular rate, no murmur  RESPIRATORY: normal effort, CTAB  ABDOMEN: soft, normal bowel sounds, nontender  Normal external female genitalia.  Small amount of blood noted in the vaginal vault.  No adnexal tenderness. No cervical motion tenderness. Cervical os closed.  ChaperoneMazin, RN at bedside during exam.  MUSCULOSKELETAL: no deformity  NEURO: alert, moves all extremities, follows commands, mental status normal/baseline  SKIN: warm, dry, no rash   Psych: Appropriate mood and affect  Nursing notes and vital signs reviewed      LAB RESULTS  Recent Results (from the past 24 hour(s))   Comprehensive Metabolic Panel    Collection Time: 04/01/22  6:40 PM    Specimen: Blood   Result Value Ref Range    Glucose 87 65 - 99 mg/dL    BUN 4 (L) 6 - 20 mg/dL    Creatinine 0.84 0.57 - 1.00 mg/dL    Sodium 137 136 - 145 mmol/L    Potassium 4.8 3.5 - 5.2 mmol/L    Chloride 101 98 - 107 mmol/L    CO2 24.0 22.0 - 29.0 mmol/L    Calcium 9.3 8.6 - 10.5 mg/dL    Total Protein 8.0 6.0 - 8.5 g/dL    Albumin 4.80 3.50 - 5.20 g/dL    ALT  (SGPT) 19 1 - 33 U/L    AST (SGOT) 34 (H) 1 - 32 U/L    Alkaline Phosphatase 102 39 - 117 U/L    Total Bilirubin 0.8 0.0 - 1.2 mg/dL    Globulin 3.2 gm/dL    A/G Ratio 1.5 g/dL    BUN/Creatinine Ratio 4.8 (L) 7.0 - 25.0    Anion Gap 12.0 5.0 - 15.0 mmol/L    eGFR 97.8 >60.0 mL/min/1.73   CBC Auto Differential    Collection Time: 04/01/22  6:40 PM    Specimen: Blood   Result Value Ref Range    WBC 7.94 3.40 - 10.80 10*3/mm3    RBC 4.85 3.77 - 5.28 10*6/mm3    Hemoglobin 13.5 12.0 - 15.9 g/dL    Hematocrit 40.5 34.0 - 46.6 %    MCV 83.5 79.0 - 97.0 fL    MCH 27.8 26.6 - 33.0 pg    MCHC 33.3 31.5 - 35.7 g/dL    RDW 15.1 12.3 - 15.4 %    RDW-SD 45.7 37.0 - 54.0 fl    MPV 12.1 (H) 6.0 - 12.0 fL    Platelets 235 140 - 450 10*3/mm3    Neutrophil % 51.8 42.7 - 76.0 %    Lymphocyte % 40.6 19.6 - 45.3 %    Monocyte % 6.0 5.0 - 12.0 %    Eosinophil % 0.8 0.3 - 6.2 %    Basophil % 0.5 0.0 - 1.5 %    Immature Grans % 0.3 0.0 - 0.5 %    Neutrophils, Absolute 4.12 1.70 - 7.00 10*3/mm3    Lymphocytes, Absolute 3.22 (H) 0.70 - 3.10 10*3/mm3    Monocytes, Absolute 0.48 0.10 - 0.90 10*3/mm3    Eosinophils, Absolute 0.06 0.00 - 0.40 10*3/mm3    Basophils, Absolute 0.04 0.00 - 0.20 10*3/mm3    Immature Grans, Absolute 0.02 0.00 - 0.05 10*3/mm3    nRBC 0.0 0.0 - 0.2 /100 WBC       Ordered the above labs and independently reviewed the results.      RADIOLOGY  US Pelvis Complete    Result Date: 4/1/2022  PELVIC ULTRASOUND INCLUDING TRANSVAGINAL IMAGING AND DOPPLER VASCULAR EVALUATION  HISTORY: Vaginal delivery 4 weeks ago. Postpartum heavy vaginal bleeding.  TECHNIQUE: The examination was performed as an emergency procedure.  FINDINGS: The uterus is normal in size, measuring 5.0 x 7.0 x 8.8 cm. There is no endometrial thickening with double wall thickness measuring 5 mm. No intrauterine abnormality is seen.  The left ovary appears normal in size and texture and the Doppler vascular evaluation appears normal. The right ovary contains a  simple cyst measuring 3.8 x 4.8 cm. The Doppler vascular evaluation appears normal. There is no free fluid present.  CONCLUSION: Simple appearing right ovarian cyst. Otherwise negative pelvic ultrasound.  This report was finalized on 4/1/2022 8:49 PM by Dr. Genaro Christopher M.D.        I ordered the above noted radiological studies and viewed the images on the PACS system.           MEDICAL RECORD REVIEW  Medical records reviewed in epic, patient had vaginal delivery March 3 of a healthy female.      PROCEDURES    Procedures        DIFFERENTIAL DIAGNOSIS  Differential diagnosis include but are not limited to the following:      PROGRESS, DATA ANALYSIS, CONSULTS, AND MEDICAL DECISION MAKING        ED Course as of 04/01/22 2309 Fri Apr 01, 2022 1837 Patient is a well-appearing 26-year-old female status post vaginal delivery March 3.  She presents today with heavy vaginal bleeding that started March 29.  Pelvic exam unremarkable other than a small amount of blood noted in the vaginal vault.  Discussed with patient that we will obtain labs to evaluate for electrolyte and hemoglobin abnormalities.  We will do a transvaginal ultrasound.  Did discuss with patient that this could be a heavy.  However will do work-up to make sure we do not find any other abnormalities.  Patient verbalized understanding and is agreeable to this plan. [MS]   1846 Reviewed pt's history and workup with Dr. Ricci.  After a bedside evaluation, they agree with the plan of care.       [MS]   1909 Hemoglobin: 13.5 [MS]   2006 Discussed with kulwant Rene, patient's pelvic ultrasound showed a simple right ovarian cyst, otherwise unremarkable ultrasound. [MS]   2026 Discussed with ELISHA Ba with [facility] regarding patient's history, physical and work-up.  She states that someone from the office should call her Monday to check on her, if not she should call and make a follow-up appointment.   [MS]   2031 Patient updated on  unremarkable work-up here today including ultrasound which showed a simple right ovarian cyst otherwise unremarkable, no retained products seen.  Patient not tachycardic, hemoglobin is stable at 13.5 and patient is well-appearing.  She will be discharged home with strict return to ER precautions and close follow-up.  She is agreeable to this plan and verbalized understanding. [MS]      ED Course User Index  [MS] Ranjana Tucker APRN     Discussed plan for discharge, as there is no emergent indication for admission. Pt/family is agreeable and understands need for follow up and repeat testing.  Pt is aware that discharge does not mean that nothing is wrong but it indicates no emergency is present that requires admission and they must continue care with follow-up as given below or physician of their choice.   Patient/Family voiced understanding of above instructions.  Patient discharged in stable condition.    DIAGNOSIS  Final diagnoses:   Postpartum hemorrhage, unspecified type       FOLLOW UP   Dai Barber MD  [address] [id number] [phone]    Call in 3 days        RX     Medication List      No changes were made to your prescriptions during this visit.           MEDICATIONS GIVEN IN ED    Medications - No data to display        COURSE & MEDICAL DECISION MAKING  Any/All labs and Any/All Imaging studies that were ordered were reviewed and are noted above.  Results were reviewed/discussed with the patient and they were also made aware of online access.    Pt also made aware that some labs, such as cultures, will not be resulted during ER visit and followup with PMD is necessary.        Ranjana Tucker APRN  04/01/22 [id number]

## 2022-04-01 NOTE — ED NOTES
Patient was placed in face mask in first look. Patient was wearing facemask when I entered the room and throughout our encounter. I wore full protective equipment throughout this patient encounter including a face mask, and gloves. Hand hygiene was performed before donning protective equipment and after removal when leaving the room.    Report received from Mazin

## 2022-04-01 NOTE — ED PROVIDER NOTES
MD ATTESTATION NOTE    The TEODORA and I have discussed this patient's history, physical exam, and treatment plan.  I have reviewed the documentation and personally had a face to face interaction with the patient. I affirm the documentation and agree with the treatment and plan.  The attached note describes my personal findings.    I provided a substantive portion of the care of this patient. I personally performed the physical exam, in its entirety.    Regine Martins is a 26 y.o. female who presents to the ED c/o vaginal bleeding.  She reports that at the beginning of March she had a vaginal delivery.  She reports she had some bleeding for the next 2 weeks and then it stopped.  She states since March 29 she is started bleeding again.  She reports it is moderate with passing clots.  She denies abdominal pain.  She reports she got somewhat dizzy this morning but has not passed out.  This was her second pregnancy.  She called her OB office who directed her here for further evaluation.      On exam:  GENERAL: Awake, alert, no acute distress  SKIN: Warm, dry  HENT: Normocephalic, atraumatic  EYES: no scleral icterus  CV: regular rhythm, regular rate  RESPIRATORY: normal effort, lungs clear  ABDOMEN: soft, nontender, nondistended  MUSCULOSKELETAL: no deformity  NEURO: alert, moves all extremities, follows commands    Labs  Recent Results (from the past 24 hour(s))   Comprehensive Metabolic Panel    Collection Time: 04/01/22  6:40 PM    Specimen: Blood   Result Value Ref Range    Glucose 87 65 - 99 mg/dL    BUN 4 (L) 6 - 20 mg/dL    Creatinine 0.84 0.57 - 1.00 mg/dL    Sodium 137 136 - 145 mmol/L    Potassium 4.8 3.5 - 5.2 mmol/L    Chloride 101 98 - 107 mmol/L    CO2 24.0 22.0 - 29.0 mmol/L    Calcium 9.3 8.6 - 10.5 mg/dL    Total Protein 8.0 6.0 - 8.5 g/dL    Albumin 4.80 3.50 - 5.20 g/dL    ALT (SGPT) 19 1 - 33 U/L    AST (SGOT) 34 (H) 1 - 32 U/L    Alkaline Phosphatase 102 39 - 117 U/L    Total Bilirubin 0.8 0.0 -  1.2 mg/dL    Globulin 3.2 gm/dL    A/G Ratio 1.5 g/dL    BUN/Creatinine Ratio 4.8 (L) 7.0 - 25.0    Anion Gap 12.0 5.0 - 15.0 mmol/L    eGFR 97.8 >60.0 mL/min/1.73   CBC Auto Differential    Collection Time: 04/01/22  6:40 PM    Specimen: Blood   Result Value Ref Range    WBC 7.94 3.40 - 10.80 10*3/mm3    RBC 4.85 3.77 - 5.28 10*6/mm3    Hemoglobin 13.5 12.0 - 15.9 g/dL    Hematocrit 40.5 34.0 - 46.6 %    MCV 83.5 79.0 - 97.0 fL    MCH 27.8 26.6 - 33.0 pg    MCHC 33.3 31.5 - 35.7 g/dL    RDW 15.1 12.3 - 15.4 %    RDW-SD 45.7 37.0 - 54.0 fl    MPV 12.1 (H) 6.0 - 12.0 fL    Platelets 235 140 - 450 10*3/mm3    Neutrophil % 51.8 42.7 - 76.0 %    Lymphocyte % 40.6 19.6 - 45.3 %    Monocyte % 6.0 5.0 - 12.0 %    Eosinophil % 0.8 0.3 - 6.2 %    Basophil % 0.5 0.0 - 1.5 %    Immature Grans % 0.3 0.0 - 0.5 %    Neutrophils, Absolute 4.12 1.70 - 7.00 10*3/mm3    Lymphocytes, Absolute 3.22 (H) 0.70 - 3.10 10*3/mm3    Monocytes, Absolute 0.48 0.10 - 0.90 10*3/mm3    Eosinophils, Absolute 0.06 0.00 - 0.40 10*3/mm3    Basophils, Absolute 0.04 0.00 - 0.20 10*3/mm3    Immature Grans, Absolute 0.02 0.00 - 0.05 10*3/mm3    nRBC 0.0 0.0 - 0.2 /100 WBC       Radiology  US Pelvis Complete    Result Date: 4/1/2022  PELVIC ULTRASOUND INCLUDING TRANSVAGINAL IMAGING AND DOPPLER VASCULAR EVALUATION  HISTORY: Vaginal delivery 4 weeks ago. Postpartum heavy vaginal bleeding.  TECHNIQUE: The examination was performed as an emergency procedure.  FINDINGS: The uterus is normal in size, measuring 5.0 x 7.0 x 8.8 cm. There is no endometrial thickening with double wall thickness measuring 5 mm. No intrauterine abnormality is seen.  The left ovary appears normal in size and texture and the Doppler vascular evaluation appears normal. The right ovary contains a simple cyst measuring 3.8 x 4.8 cm. The Doppler vascular evaluation appears normal. There is no free fluid present.  CONCLUSION: Simple appearing right ovarian cyst. Otherwise negative pelvic  ultrasound.  This report was finalized on 4/1/2022 8:49 PM by Dr. Genaro Christopher M.D.         Medical Decision Making:  ED Course as of 04/02/22 1239   Fri Apr 01, 2022   1837 Patient is a well-appearing 26-year-old female status post vaginal delivery March 3.  She presents today with heavy vaginal bleeding that started March 29.  Pelvic exam unremarkable other than a small amount of blood noted in the vaginal vault.  Discussed with patient that we will obtain labs to evaluate for electrolyte and hemoglobin abnormalities.  We will do a transvaginal ultrasound.  Did discuss with patient that this could be a heavy.  However will do work-up to make sure we do not find any other abnormalities.  Patient verbalized understanding and is agreeable to this plan. [MS]   1846 Reviewed pt's history and workup with Dr. Ricci.  After a bedside evaluation, they agree with the plan of care.       [MS]   1909 Hemoglobin: 13.5 [MS]   2006 Discussed with kulwant Rene, patient's pelvic ultrasound showed a simple right ovarian cyst, otherwise unremarkable ultrasound. [MS]   2026 Discussed with ELISHA Ba with [facility] regarding patient's history, physical and work-up.  She states that someone from the office should call her Monday to check on her, if not she should call and make a follow-up appointment.   [MS]   2031 Patient updated on unremarkable work-up here today including ultrasound which showed a simple right ovarian cyst otherwise unremarkable, no retained products seen.  Patient not tachycardic, hemoglobin is stable at 13.5 and patient is well-appearing.  She will be discharged home with strict return to ER precautions and close follow-up.  She is agreeable to this plan and verbalized understanding. [MS]      ED Course User Index  [MS] Ranjana Tucker, APRN       Plan to check her hemoglobin, perform an ultrasound of her pelvis.    PPE: The patient wore a mask and I wore an N95 mask throughout  the entire patient encounter.      The patient qualifies to receive the vaccine, but they have not yet received it.    Diagnosis  Final diagnoses:   Postpartum hemorrhage, unspecified type        Jeremiah Ricci MD  04/01/22 2009       Jeremiah Ricci MD  04/02/22 3423

## 2022-04-02 NOTE — DISCHARGE INSTRUCTIONS
Increase fluids  Rest as much as possible  Someone should call you from  [facility] on Monday, if you have not heard from them by the afternoon please call their office to make an appointment  Return to the ER for fever, chills, abdominal pain, dizziness, weakness, increased bleeding or any new or worsening symptoms

## 2023-03-31 NOTE — ANESTHESIA PREPROCEDURE EVALUATION
Anesthesia Evaluation     Patient summary reviewed and Nursing notes reviewed                Airway   Mallampati: II  TM distance: >3 FB  Neck ROM: full  No difficulty expected  Dental      Pulmonary    Cardiovascular         Neuro/Psych  (+) headaches,     GI/Hepatic/Renal/Endo      Musculoskeletal     Abdominal    Substance History      OB/GYN    (+) Pregnant,         Other                        Anesthesia Plan    ASA 2     epidural     Anesthetic plan, all risks, benefits, and alternatives have been provided, discussed and informed consent has been obtained with: patient.      
31-Mar-2023 10:52